# Patient Record
Sex: FEMALE | Race: BLACK OR AFRICAN AMERICAN | NOT HISPANIC OR LATINO | Employment: UNEMPLOYED | URBAN - NONMETROPOLITAN AREA
[De-identification: names, ages, dates, MRNs, and addresses within clinical notes are randomized per-mention and may not be internally consistent; named-entity substitution may affect disease eponyms.]

---

## 2017-01-10 ENCOUNTER — LAB (OUTPATIENT)
Dept: ONCOLOGY | Facility: CLINIC | Age: 57
End: 2017-01-10

## 2017-01-10 ENCOUNTER — OFFICE VISIT (OUTPATIENT)
Dept: ONCOLOGY | Facility: CLINIC | Age: 57
End: 2017-01-10

## 2017-01-10 VITALS
DIASTOLIC BLOOD PRESSURE: 78 MMHG | RESPIRATION RATE: 16 BRPM | WEIGHT: 284 LBS | OXYGEN SATURATION: 98 % | TEMPERATURE: 97.8 F | HEART RATE: 68 BPM | BODY MASS INDEX: 50.32 KG/M2 | SYSTOLIC BLOOD PRESSURE: 138 MMHG | HEIGHT: 63 IN

## 2017-01-10 DIAGNOSIS — C50.911: Primary | ICD-10-CM

## 2017-01-10 DIAGNOSIS — C50.411 MALIGNANT NEOPLASM OF UPPER-OUTER QUADRANT OF RIGHT FEMALE BREAST (HCC): ICD-10-CM

## 2017-01-10 DIAGNOSIS — C50.411 MALIGNANT NEOPLASM OF UPPER-OUTER QUADRANT OF RIGHT FEMALE BREAST (HCC): Primary | ICD-10-CM

## 2017-01-10 DIAGNOSIS — B35.4 TINEA CORPORIS: ICD-10-CM

## 2017-01-10 DIAGNOSIS — D64.9 ANEMIA, UNSPECIFIED TYPE: Primary | ICD-10-CM

## 2017-01-10 DIAGNOSIS — N64.4 BREAST PAIN, LEFT: ICD-10-CM

## 2017-01-10 DIAGNOSIS — C50.911: ICD-10-CM

## 2017-01-10 PROBLEM — C50.919: Status: ACTIVE | Noted: 2017-01-10

## 2017-01-10 LAB
ALBUMIN SERPL-MCNC: 3.8 G/DL (ref 3.5–5)
ALBUMIN/GLOB SERPL: 1 G/DL
ALP SERPL-CCNC: 83 U/L (ref 38–126)
ALT SERPL W P-5'-P-CCNC: 22 U/L (ref 9–52)
ANION GAP SERPL CALCULATED.3IONS-SCNC: 11 MMOL/L
AST SERPL-CCNC: 22 U/L (ref 5–40)
AUTO MIXED CELLS #: 0.3 10*3/UL (ref 0.1–1.5)
AUTO MIXED CELLS %: 7.4 % (ref 0.2–15.1)
BILIRUB SERPL-MCNC: 0.8 MG/DL (ref 0.2–1.3)
BUN BLD-MCNC: 14 MG/DL (ref 7–26)
BUN/CREAT SERPL: 14 (ref 7–25)
CALCIUM SPEC-SCNC: 8.6 MG/DL (ref 8.4–10.2)
CHLORIDE SERPL-SCNC: 102 MMOL/L (ref 98–107)
CO2 SERPL-SCNC: 29 MMOL/L (ref 22–30)
CREAT BLD-MCNC: 1 MG/DL (ref 0.7–1.4)
ERYTHROCYTE [DISTWIDTH] IN BLOOD BY AUTOMATED COUNT: 15.8 % (ref 11.5–14.5)
GFR SERPL CREATININE-BSD FRML MDRD: 57 ML/MIN/1.73
GLOBULIN UR ELPH-MCNC: 3.8 GM/DL
GLUCOSE BLD-MCNC: 130 MG/DL (ref 75–110)
HCT VFR BLD AUTO: 38.1 % (ref 37–47)
HGB BLD-MCNC: 10.9 G/DL (ref 12–16)
LYMPHOCYTES # BLD AUTO: 1.4 10*3/MM3 (ref 0.8–7)
LYMPHOCYTES NFR BLD AUTO: 33.6 % (ref 10–58.5)
MCH RBC QN AUTO: 25 PG (ref 27–31)
MCHC RBC AUTO-ENTMCNC: 28.6 G/DL (ref 33–37)
MCV RBC AUTO: 87.3 FL (ref 81–99)
NEUTROPHILS # BLD AUTO: 2.5 10*3/MM3 (ref 2–7.8)
NEUTROPHILS NFR BLD AUTO: 59 % (ref 37–92)
PLATELET # BLD AUTO: 226 10*3/MM3 (ref 130–400)
PMV BLD AUTO: 8.4 FL (ref 6–12)
POTASSIUM BLD-SCNC: 4.1 MMOL/L (ref 3.6–5)
PROT SERPL-MCNC: 7.6 G/DL (ref 6.3–8.2)
RBC # BLD AUTO: 4.36 10*6/MM3 (ref 4.2–5.4)
SODIUM BLD-SCNC: 142 MMOL/L (ref 137–145)
WBC NRBC COR # BLD: 4.2 10*3/MM3 (ref 4.8–10.8)

## 2017-01-10 PROCEDURE — 85025 COMPLETE CBC W/AUTO DIFF WBC: CPT | Performed by: INTERNAL MEDICINE

## 2017-01-10 PROCEDURE — 99214 OFFICE O/P EST MOD 30 MIN: CPT | Performed by: INTERNAL MEDICINE

## 2017-01-10 PROCEDURE — 80053 COMPREHEN METABOLIC PANEL: CPT | Performed by: INTERNAL MEDICINE

## 2017-01-10 PROCEDURE — 36415 COLL VENOUS BLD VENIPUNCTURE: CPT | Performed by: INTERNAL MEDICINE

## 2017-01-10 RX ORDER — CLONIDINE HYDROCHLORIDE 0.1 MG/1
0.1 TABLET ORAL AS NEEDED
COMMUNITY

## 2017-01-10 RX ORDER — TERBINAFINE HYDROCHLORIDE 250 MG/1
TABLET ORAL
Qty: 21 TABLET | Refills: 0 | Status: SHIPPED | OUTPATIENT
Start: 2017-01-10 | End: 2017-01-26

## 2017-01-10 NOTE — PROGRESS NOTES
[facility]  HEMATOLOGY & ONCOLOGY        Subjective      Patient presents to the office for follow-up.  She reports pain on her left breast, laterally deep close to the chest wall.  The pain is present for the past 2-3 weeks.  She also had an ultrasound and but apparently did not show any abnormality.  Patient does not take any iron supplements.  She did have a mammogram in the middle of last year.  She does have some chronic rash on the lower portion of both breasts and upper abdominal wall , patient states that the rash comes and goes.      VISIT DIAGNOSIS:   Encounter Diagnoses   Name Primary?   • Breast pain, left    • Anemia, unspecified type Yes   • Tinea corporis    • Sarcoma of breast, right        REASON FOR VISIT:   No chief complaint on file.       HEMATOLOGY / ONCOLOGY HISTORY:    No history exists.       Ms. 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 Cortez Koenig. The patient  underwent wide excision of the  tail of the breast tumor and axillary node dissection. Resection was negative for further neoplasm and 11 nodes were negative for  metastatic disease. The patient has been followed off all therapy.  Tumor was negative for estrogen receptor and progesterone receptor.      She then later developed a recurrence and she has undergone repeat surgical procedure performed by Dr. Lali Patel on 11/30/2012, finding recurrent sarcomatoid tumor with the posterior margin of tumor involved. since the patient has not had disease recurrence.    Bilateral mammogram on 07/01/2016 does not show any evidence of malignancy.    She did have colonoscopy with Dr. Aviles in September finding polyps  She   Cancer Staging Information:  No matching staging information was found for the patient.      INTERVAL HISTORY  Patient ID: Patti Gilbert is a 56 y.o. year old female     Past Medical History:   Past Medical History   Diagnosis Date   •  Atrial septal defect      [id number]   • Breast cancer    • COPD (chronic obstructive pulmonary disease)    • H/O cardiac catheterization    • History of left fractured kneecap    • Hypertension    • Mini Stroke    • Morbid obesity    • Myocardial infarction    • Recretional Drug use    • Right knee injury      Past Surgical History:   Past Surgical History   Procedure Laterality Date   • Cardiac surgery       Closure of ASD   • Tubal abdominal ligation Bilateral    • Ganglion cyst excision     • Rotator cuff repair     • Knee arthroscopy Right 01/13/2009   • Breast mass excision       2010     Social History:   Social History     Social History   • Marital status:      Spouse name: N/A   • Number of children: N/A   • Years of education: N/A     Occupational History   • Not on file.     Social History Main Topics   • Smoking status: Never Smoker   • Smokeless tobacco: Not on file   • Alcohol use Yes   • Drug use: Not on file   • Sexual activity: Not on file     Other Topics Concern   • Not on file     Social History Narrative     Family History:   Family History   Problem Relation Age of Onset   • Alzheimer's disease Mother        Review of Systems   Constitutional: Positive for activity change, appetite change and diaphoresis.   HENT: Positive for congestion.    Respiratory: Positive for cough, shortness of breath and wheezing.    Cardiovascular: Positive for leg swelling.   Musculoskeletal: Positive for back pain, joint swelling, neck pain and neck stiffness.   Neurological: Positive for dizziness, light-headedness and headaches.   Hematological: Bruises/bleeds easily.   Psychiatric/Behavioral: Positive for agitation. The patient is nervous/anxious.         Performance Status:  Asymptomatic    Medications:    Current Outpatient Prescriptions   Medication Sig Dispense Refill   • CloNIDine (CATAPRES) 0.1 MG tablet Take 0.1 mg by mouth As Needed for high blood pressure.     • albuterol (PROVENTIL HFA) 108 (90  "BASE) MCG/ACT inhaler Inhale.     • aspirin (ASPIR) 81 MG EC tablet Take 81 mg by mouth Daily.     • atenolol (TENORMIN) 25 MG tablet Take 25 mg by mouth Daily.     • busPIRone (BUSPAR) 10 MG tablet Take 10 mg by mouth.     • ferrous sulfate 325 (65 FE) MG tablet Take 325 mg by mouth Daily.     • furosemide (LASIX) 40 MG tablet Take 40 mg by mouth.     • lisinopril (PRINIVIL,ZESTRIL) 40 MG tablet Take 40 mg by mouth Daily.     • meloxicam (MOBIC) 15 MG tablet Take 15 mg by mouth Daily.     • metFORMIN (GLUCOPHAGE) 500 MG tablet Take 500 mg by mouth.     • methocarbamol (ROBAXIN) 500 MG tablet Take 500 mg by mouth.     • Oxycodone-Acetaminophen (PERCOCET) 7.5-500 MG per tablet Take 1 tablet by mouth.       No current facility-administered medications for this visit.        ALLERGIES:    Allergies   Allergen Reactions   • Cefdinir    • Levofloxacin        Objective      Vitals:    01/10/17 1322   BP: 138/78   Pulse: 68   Resp: 16   Temp: 97.8 °F (36.6 °C)   TempSrc: Tympanic   SpO2: 98%   Weight: 284 lb (129 kg)   Height: 63\" (160 cm)     Visit Vitals   • /78   • Pulse 68   • Temp 97.8 °F (36.6 °C) (Tympanic)   • Resp 16   • Ht 63\" (160 cm)   • Wt 284 lb (129 kg)   • LMP  (LMP Unknown)   • SpO2 98%   • BMI 50.31 kg/m2       Current Status 1/10/2017   ECOG score 2         General Appearance:    Alert, cooperative, no distress, appears stated age ..obese female.     Head:    Normocephalic, without obvious abnormality, atraumatic   Eyes:    PERRL, conjunctiva pink, sclera clear, EOM's intact   Ears:    Not examined   Nose:   Nares normal, septum midline, mucosa normal, no drainage     or sinus tenderness   Throat:   Lips, mucosa, and tongue normal; teeth and gums normal,     mucous membranes moist   Neck:   Supple, Trachea midline   Back:     Symmetric, no curvature, ROM normal, no CVA tenderness   Lungs:     Clear to auscultation bilaterally, respirations unlabored   Chest Wall:    tenderness on palpation of the " chest wall in the upper quadrant of the left breast.  No palpable masses.  There is slight nipple retraction on the right breast.      Heart:    Regular rate and rhythm, S1 and S2 normal, no murmur, rub    or gallop   Abdomen:     Soft, non-tender, bowel sounds active all four quadrants,     no masses, no organomegaly   Extremities:   Extremities normal, atraumatic, no cyanosis or edema       SKIN erythematous and scaly and hyperpigmented scaly rash noted on the lower breasts and upper abdomen         Cervical, supraclavicular, and axillary nodes normal   Neurologic:   Grossly nonfocal, gait coordinated and smooth, cognition is   preserved.       RECENT LABS:  Results for orders placed or performed in visit on 01/10/17   Comprehensive Metabolic Panel   Result Value Ref Range    Glucose 130 (H) 75 - 110 mg/dL    BUN 14 7 - 26 mg/dL    Creatinine 1.00 0.70 - 1.40 mg/dL    Sodium 142 137 - 145 mmol/L    Potassium 4.1 3.6 - 5.0 mmol/L    Chloride 102 98 - 107 mmol/L    CO2 29.0 22.0 - 30.0 mmol/L    Calcium 8.6 8.4 - 10.2 mg/dL    Total Protein 7.6 6.3 - 8.2 g/dL    Albumin 3.80 3.50 - 5.00 g/dL    ALT (SGPT) 22 9 - 52 U/L    AST (SGOT) 22 5 - 40 U/L    Alkaline Phosphatase 83 38 - 126 U/L    Total Bilirubin 0.8 0.2 - 1.3 mg/dL    eGFR Non African Amer 57 (L) >60 mL/min/1.73    Globulin 3.8 gm/dL    A/G Ratio 1.0 g/dL    BUN/Creatinine Ratio 14.0 7.0 - 25.0    Anion Gap 11.0 mmol/L   CBC Auto Differential   Result Value Ref Range    WBC 4.20 (L) 4.80 - 10.80 10*3/mm3    RBC 4.36 4.20 - 5.40 10*6/mm3    Hemoglobin 10.9 (L) 12.0 - 16.0 g/dL    Hematocrit 38.1 37.0 - 47.0 %    MCV 87.3 81.0 - 99.0 fL    MCH 25.0 (L) 27.0 - 31.0 pg    MCHC 28.6 (L) 33.0 - 37.0 g/dL    RDW 15.8 (H) 11.5 - 14.5 %    MPV 8.4 6.0 - 12.0 fL    Platelets 226 130 - 400 10*3/mm3    Neutrophil % 59.0 37.0 - 92.0 %    Lymphocyte % 33.6 10.0 - 58.5 %    Auto Mixed Cells % 7.4 0.2 - 15.1 %    Neutrophils, Absolute 2.50 2.00 - 7.80 10*3/mm3     Lymphocytes, Absolute 1.40 0.80 - 7.00 10*3/mm3    Auto Mixed Cells # 0.30 0.10 - 1.50 10*3/uL             Assessment/Plan     Patient Active Problem List   Diagnosis   • Tinea corporis   • Anemia   • Sarcoma of breast        Diagnoses and all orders for this visit:    Anemia, unspecified type  -     Fe+TIBC+Kash  -     Vitamin B12 & Folate  -     Mammo Diagnostic Left With CAD; Future    Breast pain, left  -     Mammo Diagnostic Left With CAD; Future    Tinea corporis    Sarcoma of breast, right       1.  History of Metaplastic sarcomatoid right breast carcinoma diagnosed 2010. Recurrence was found on mammogram and ultrasound and a repeat  resection on 10/16/2012 was performed with a positive margin. Repeat surgery 11/30/2012. Biopsy report on 12/26/2015 shows healing  excisional wound negative for residual tumor.  Since then patient has been disease free.  2.  Left breast pain: Physical exam reveals tenderness on the deep chest wall in the upper part of the left breast.  Patient had an ultrasound recently and it is apparently negative.  We will obtain that report.  On exam, I can feel some tender soft tissue seems to be pectoralis muscle  3.  Chronic anemia she was on previously  4.  Tinea corporis involving the lower breastsand the upper abdominal wall.      Plan  - We will order diagnostic mammogram of the left breast.  We will order anemia workup  Start patient on terfenadine for systemic therapy of her tinea corporis (dermatophytosis )   follow-up in 2-3 weeks.            Paulo Scott MD    1/10/2017    2:12 PM

## 2017-01-11 LAB
CANCER AG27-29 SERPL-ACNC: 6 U/ML (ref 0–38.6)
CEA SERPL-MCNC: 1.67 NG/ML (ref 0–5)
FERRITIN SERPL-MCNC: 34.4 NG/ML (ref 11.1–264)
FOLATE SERPL-MCNC: 12.2 NG/ML
IRON SATN MFR SERPL: 27 % (ref 20–45)
IRON SERPL-MCNC: 88 MCG/DL (ref 42–180)
TIBC SERPL-MCNC: 322 MCG/DL (ref 225–420)
UIBC SERPL-MCNC: 234 MCG/DL
VIT B12 SERPL-MCNC: 383 PG/ML (ref 239–931)

## 2017-01-23 DIAGNOSIS — C50.411 MALIGNANT NEOPLASM OF UPPER-OUTER QUADRANT OF RIGHT FEMALE BREAST (HCC): Primary | ICD-10-CM

## 2017-01-24 ENCOUNTER — OFFICE VISIT (OUTPATIENT)
Dept: ONCOLOGY | Facility: CLINIC | Age: 57
End: 2017-01-24

## 2017-01-24 ENCOUNTER — LAB (OUTPATIENT)
Dept: ONCOLOGY | Facility: CLINIC | Age: 57
End: 2017-01-24

## 2017-01-24 VITALS
BODY MASS INDEX: 49.89 KG/M2 | SYSTOLIC BLOOD PRESSURE: 146 MMHG | DIASTOLIC BLOOD PRESSURE: 82 MMHG | RESPIRATION RATE: 18 BRPM | HEIGHT: 63 IN | TEMPERATURE: 98.2 F | OXYGEN SATURATION: 98 % | WEIGHT: 281.6 LBS | HEART RATE: 79 BPM

## 2017-01-24 DIAGNOSIS — C50.411 MALIGNANT NEOPLASM OF UPPER-OUTER QUADRANT OF RIGHT FEMALE BREAST (HCC): ICD-10-CM

## 2017-01-24 DIAGNOSIS — C50.911: Primary | ICD-10-CM

## 2017-01-24 LAB
ALBUMIN SERPL-MCNC: 4 G/DL (ref 3.5–5)
ALBUMIN/GLOB SERPL: 1 G/DL
ALP SERPL-CCNC: 93 U/L (ref 38–126)
ALT SERPL W P-5'-P-CCNC: 19 U/L (ref 9–52)
ANION GAP SERPL CALCULATED.3IONS-SCNC: 12 MMOL/L
AST SERPL-CCNC: 25 U/L (ref 5–40)
AUTO MIXED CELLS #: 0.4 10*3/UL (ref 0.1–1.5)
AUTO MIXED CELLS %: 6.6 % (ref 0.2–15.1)
BILIRUB SERPL-MCNC: 0.6 MG/DL (ref 0.2–1.3)
BUN BLD-MCNC: 17 MG/DL (ref 7–26)
BUN/CREAT SERPL: 17 (ref 7–25)
CALCIUM SPEC-SCNC: 8.9 MG/DL (ref 8.4–10.2)
CHLORIDE SERPL-SCNC: 103 MMOL/L (ref 98–107)
CO2 SERPL-SCNC: 30 MMOL/L (ref 22–30)
CREAT BLD-MCNC: 1 MG/DL (ref 0.7–1.4)
ERYTHROCYTE [DISTWIDTH] IN BLOOD BY AUTOMATED COUNT: 16.3 % (ref 11.5–14.5)
GFR SERPL CREATININE-BSD FRML MDRD: 57 ML/MIN/1.73
GLOBULIN UR ELPH-MCNC: 4 GM/DL
GLUCOSE BLD-MCNC: 131 MG/DL (ref 75–110)
HCT VFR BLD AUTO: 39.6 % (ref 37–47)
HGB BLD-MCNC: 11.6 G/DL (ref 12–16)
LYMPHOCYTES # BLD AUTO: 2.1 10*3/MM3 (ref 0.8–7)
LYMPHOCYTES NFR BLD AUTO: 32.8 % (ref 10–58.5)
MCH RBC QN AUTO: 25.9 PG (ref 27–31)
MCHC RBC AUTO-ENTMCNC: 29.3 G/DL (ref 33–37)
MCV RBC AUTO: 88.5 FL (ref 81–99)
NEUTROPHILS # BLD AUTO: 3.9 10*3/MM3 (ref 2–7.8)
NEUTROPHILS NFR BLD AUTO: 60.6 % (ref 37–92)
PLATELET # BLD AUTO: 310 10*3/MM3 (ref 130–400)
PMV BLD AUTO: 8.1 FL (ref 6–12)
POTASSIUM BLD-SCNC: 4.2 MMOL/L (ref 3.6–5)
PROT SERPL-MCNC: 8 G/DL (ref 6.3–8.2)
RBC # BLD AUTO: 4.48 10*6/MM3 (ref 4.2–5.4)
SODIUM BLD-SCNC: 145 MMOL/L (ref 137–145)
WBC NRBC COR # BLD: 6.4 10*3/MM3 (ref 4.8–10.8)

## 2017-01-24 PROCEDURE — 80053 COMPREHEN METABOLIC PANEL: CPT | Performed by: INTERNAL MEDICINE

## 2017-01-24 PROCEDURE — 36415 COLL VENOUS BLD VENIPUNCTURE: CPT | Performed by: INTERNAL MEDICINE

## 2017-01-24 PROCEDURE — 99214 OFFICE O/P EST MOD 30 MIN: CPT | Performed by: INTERNAL MEDICINE

## 2017-01-24 PROCEDURE — 85025 COMPLETE CBC W/AUTO DIFF WBC: CPT | Performed by: INTERNAL MEDICINE

## 2017-01-24 NOTE — MR AVS SNAPSHOT
Patti Gilbert   1/24/2017 2:20 PM   Office Visit    Dept Phone:  [phone]   Encounter #:  [id number]    Provider:  Pedro Luis Ferreira MD   Department:  [facility] HEMATOLOGY & ONCOLOGY                Your Full Care Plan              Your Updated Medication List          This list is accurate as of: 1/24/17  3:55 PM.  Always use your most recent med list.                ASPIR 81 MG EC tablet   Generic drug:  aspirin       atenolol 25 MG tablet   Commonly known as:  TENORMIN       busPIRone 10 MG tablet   Commonly known as:  BUSPAR       CloNIDine 0.1 MG tablet   Commonly known as:  CATAPRES       ferrous sulfate 325 (65 FE) MG tablet       furosemide 40 MG tablet   Commonly known as:  LASIX       lisinopril 40 MG tablet   Commonly known as:  PRINIVIL,ZESTRIL       meloxicam 15 MG tablet   Commonly known as:  MOBIC       metFORMIN 500 MG tablet   Commonly known as:  GLUCOPHAGE       methocarbamol 500 MG tablet   Commonly known as:  ROBAXIN       PERCOCET 7.5-500 MG per tablet   Generic drug:  Oxycodone-Acetaminophen       PROVENTIL  (90 BASE) MCG/ACT inhaler   Generic drug:  albuterol       terbinafine 250 MG tablet   Commonly known as:  lamiSIL   One tab po qd x 3 weeks, until finished               You Were Diagnosed With        Codes Comments    Sarcoma of breast, right    -  Primary ICD-10-CM: C50.911  ICD-9-CM: 174.9       Instructions     None    Patient Instructions History      Upcoming Appointments     Visit Type Date Time Department    FOLLOW UP 1 UNIT 1/24/2017  2:20 PM [facility]    LAB 1/24/2017  2:00 PM [facility]    FOLLOW UP 2/2/2017 10:00 AM [facility] PAD      MyChart Signup     Our records indicate that you have declined [facility] [employer] signup. If you would like to sign up for [employer], please email [facility], [email or web address] [email or web address] or call [phone] to obtain an activation code.             Other Info from Your Visit            "  Your Appointments     Feb 02, 2017 10:00 AM CST   Follow Up with [facility] NP   [facility] (--)    [address] [place] [address] [address]  [place] [address] [phone]           Arrive 15 minutes prior to appointment.              Allergies     Cefdinir      Levofloxacin        Reason for Visit     Follow-up She is here for f/u visit today.      Vital Signs     Blood Pressure Pulse Temperature Respirations Height Weight    146/82 79 98.2 °F (36.8 °C) (Tympanic) 18 63\" (160 cm) 281 lb 9.6 oz (128 kg)    Last Menstrual Period Oxygen Saturation Body Mass Index Smoking Status          (LMP Unknown) 98% 49.88 kg/m2 Never Smoker        Problems and Diagnoses Noted     Sarcoma of breast        "

## 2017-01-25 LAB
CANCER AG27-29 SERPL-ACNC: 9.5 U/ML (ref 0–38.6)
CEA SERPL-MCNC: 1.9 NG/ML (ref 0–4.7)

## 2017-01-25 NOTE — PROGRESS NOTES
No matching staging information was found for the patient.       No history exists.               Subjective     HISTORY OF PRESENT ILLNESS: Ms. 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  Eleven nodes were negative for metastatic disease.  Tumor was negative for estrogen and progesterone receptors which would be expected in a sarcoma.    The patient came back because she had felt something unusual or discomfort in the left side of her chest and had a mammogram and an ultrasound and though they are both unremarkable. She wonders if levaquin made the breast  (L) huirt as it can affect ligaments.  When she was seen here in January of this year just a few days ago on the 10th a mild anemia was noted . At the time she had a respiratory infection. Today's hemoglobin is 11.6 just minimal anemia.          Past Medical History, Past Surgical History, Social History, Family History have been reviewed and are without significant changes except as mentioned.     Her previous  hemoglobin was 10.9.  The MCVs on the high 80s and RDW's are up a bit.  The B12 was 383 and folate 12.2.  Serum iron 88 with a iron binding capacity of 322 and ferritin was 34.4 for some reason CEAs and CA-27-29 7 being done today as well CEA was 1.67 CA-27-29 is 6' As far as I know of these studies are never become positive in sarcomas.  I look to see it for chest x-rays which I believe must been done somewhere since one would follow that sarcoma  That way but at the present I do not see one.  It may have been done at an outside site as the mammogram and ultrasound were. The patient is on iron is no evidence suggestive of iron loss through bleeding.    Short while ago the patient  did have a cough and some bronchitis within 2 weeks ago.  The hemoglobins go drop due acute infections and improve  after the episodes  Resolve. The patient has been treated for tenia corporis.  She is asked me about this rash fluid on her chest beneath her breasts both sides and upper abdomen. She was placed on her terfenidine , a systemic treatment, she says is not really working.      She should see dermatology.    Review of Systems   Constitutional: Positive for appetite change, chills and fatigue. Negative for diaphoresis and fever.   HENT: Negative for ear pain, facial swelling, mouth sores, nosebleeds, sore throat, tinnitus, trouble swallowing and voice change.    Eyes: Positive for itching. Negative for pain, redness and visual disturbance.   Respiratory: Positive for cough, shortness of breath and wheezing. Negative for chest tightness.    Cardiovascular: Negative for chest pain, palpitations and leg swelling.   Gastrointestinal: Positive for diarrhea. Negative for abdominal pain, anal bleeding, blood in stool, constipation, nausea and vomiting.   Endocrine: Positive for cold intolerance and heat intolerance. Negative for polydipsia and polyuria.   Genitourinary: Negative for difficulty urinating, dysuria, frequency, hematuria and urgency.   Musculoskeletal: Positive for back pain, joint swelling, neck pain and neck stiffness. Negative for arthralgias and myalgias.   Skin: Positive for rash. Negative for wound.   Neurological: Positive for headaches. Negative for tremors, seizures, speech difficulty, weakness, light-headedness and numbness.   Hematological: Negative for adenopathy. Does not bruise/bleed easily.   Psychiatric/Behavioral: Positive for agitation, confusion and sleep disturbance. Negative for dysphoric mood. The patient is nervous/anxious and is hyperactive.           Medications:        Current Outpatient Prescriptions:   •  albuterol (PROVENTIL HFA) 108 (90 BASE) MCG/ACT inhaler, Inhale.,  "Disp: , Rfl:   •  aspirin (ASPIR) 81 MG EC tablet, Take 81 mg by mouth Daily., Disp: , Rfl:   •  atenolol (TENORMIN) 25 MG tablet, Take 25 mg by mouth Daily., Disp: , Rfl:   •  busPIRone (BUSPAR) 10 MG tablet, Take 10 mg by mouth., Disp: , Rfl:   •  CloNIDine (CATAPRES) 0.1 MG tablet, Take 0.1 mg by mouth As Needed for high blood pressure., Disp: , Rfl:   •  furosemide (LASIX) 40 MG tablet, Take 40 mg by mouth., Disp: , Rfl:   •  lisinopril (PRINIVIL,ZESTRIL) 40 MG tablet, Take 40 mg by mouth Daily., Disp: , Rfl:   •  meloxicam (MOBIC) 15 MG tablet, Take 15 mg by mouth Daily., Disp: , Rfl:   •  metFORMIN (GLUCOPHAGE) 500 MG tablet, Take 500 mg by mouth., Disp: , Rfl:   •  methocarbamol (ROBAXIN) 500 MG tablet, Take 500 mg by mouth., Disp: , Rfl:   •  Oxycodone-Acetaminophen (PERCOCET) 7.5-500 MG per tablet, Take 1 tablet by mouth., Disp: , Rfl:   •  terbinafine (lamiSIL) 250 MG tablet, One tab po qd x 3 weeks, until finished, Disp: 21 tablet, Rfl: 0    ALLERGIES:    Allergies   Allergen Reactions   • Cefdinir    • Levofloxacin        Objective      Vitals:    01/24/17 1451   BP: 146/82   Pulse: 79   Resp: 18   Temp: 98.2 °F (36.8 °C)   TempSrc: Tympanic   SpO2: 98%   Weight: 281 lb 9.6 oz (128 kg)   Height: 63\" (160 cm)     Current Status 1/24/2017   ECOG score 2       Physical Exam   Constitutional: She is oriented to person, place, and time. She appears well-developed. No distress.   obese   HENT:   Head: Normocephalic.   Mouth/Throat: Oropharynx is clear and moist.   Eyes: EOM are normal. No scleral icterus.   Neck: No tracheal deviation present. No thyromegaly present.   Cardiovascular: Normal rate, regular rhythm and normal heart sounds.  Exam reveals no gallop.    No murmur heard.  Pulmonary/Chest: Effort normal and breath sounds normal. No respiratory distress. She has no wheezes. She has no rales. Right breast exhibits no mass, no nipple discharge and no skin change. Left breast exhibits no mass, no nipple " discharge and no skin change.       Abdominal: Soft. Bowel sounds are normal. She exhibits no mass. There is no hepatosplenomegaly. There is no tenderness.   Musculoskeletal: She exhibits no tenderness or deformity.   Lymphadenopathy:     She has no cervical adenopathy.     She has no axillary adenopathy.        Right: No inguinal and no supraclavicular adenopathy present.        Left: No inguinal and no supraclavicular adenopathy present.   Neurological: She is alert and oriented to person, place, and time. She has normal strength. No cranial nerve deficit.   Psychiatric: She has a normal mood and affect. Her behavior is normal.   Vitals reviewed.        RECENT LABS:  Hematology WBC   Date Value Ref Range Status   01/24/2017 6.40 4.80 - 10.80 10*3/mm3 Final     RBC   Date Value Ref Range Status   01/24/2017 4.48 4.20 - 5.40 10*6/mm3 Final     HEMOGLOBIN   Date Value Ref Range Status   01/24/2017 11.6 (L) 12.0 - 16.0 g/dL Final     HEMATOCRIT   Date Value Ref Range Status   01/24/2017 39.6 37.0 - 47.0 % Final     PLATELETS   Date Value Ref Range Status   01/24/2017 310 130 - 400 10*3/mm3 Final            Lab on 01/24/2017   Component Date Value Ref Range Status   • Glucose 01/24/2017 131* 75 - 110 mg/dL Final   • BUN 01/24/2017 17  7 - 26 mg/dL Final   • Creatinine 01/24/2017 1.00  0.70 - 1.40 mg/dL Final   • Sodium 01/24/2017 145  137 - 145 mmol/L Final   • Potassium 01/24/2017 4.2  3.6 - 5.0 mmol/L Final   • Chloride 01/24/2017 103  98 - 107 mmol/L Final   • CO2 01/24/2017 30.0  22.0 - 30.0 mmol/L Final   • Calcium 01/24/2017 8.9  8.4 - 10.2 mg/dL Final   • Total Protein 01/24/2017 8.0  6.3 - 8.2 g/dL Final   • Albumin 01/24/2017 4.00  3.50 - 5.00 g/dL Final   • ALT (SGPT) 01/24/2017 19  9 - 52 U/L Final   • AST (SGOT) 01/24/2017 25  5 - 40 U/L Final   • Alkaline Phosphatase 01/24/2017 93  38 - 126 U/L Final   • Total Bilirubin 01/24/2017 0.6  0.2 - 1.3 mg/dL Final   • eGFR Non  Amer 01/24/2017 57* >60  mL/min/1.73 Final   • Globulin 01/24/2017 4.0  gm/dL Final   • A/G Ratio 01/24/2017 1.0  g/dL Final   • BUN/Creatinine Ratio 01/24/2017 17.0  7.0 - 25.0 Final   • Anion Gap 01/24/2017 12.0  mmol/L Final   • WBC 01/24/2017 6.40  4.80 - 10.80 10*3/mm3 Final   • RBC 01/24/2017 4.48  4.20 - 5.40 10*6/mm3 Final   • Hemoglobin 01/24/2017 11.6* 12.0 - 16.0 g/dL Final   • Hematocrit 01/24/2017 39.6  37.0 - 47.0 % Final   • MCV 01/24/2017 88.5  81.0 - 99.0 fL Final   • MCH 01/24/2017 25.9* 27.0 - 31.0 pg Final   • MCHC 01/24/2017 29.3* 33.0 - 37.0 g/dL Final   • RDW 01/24/2017 16.3* 11.5 - 14.5 % Final   • MPV 01/24/2017 8.1  6.0 - 12.0 fL Final   • Platelets 01/24/2017 310  130 - 400 10*3/mm3 Final   • Neutrophil % 01/24/2017 60.6  37.0 - 92.0 % Final   • Lymphocyte % 01/24/2017 32.8  10.0 - 58.5 % Final   • Auto Mixed Cells % 01/24/2017 6.6  0.2 - 15.1 % Final   • Neutrophils, Absolute 01/24/2017 3.90  2.00 - 7.80 10*3/mm3 Final   • Lymphocytes, Absolute 01/24/2017 2.10  0.80 - 7.00 10*3/mm3 Final   • Auto Mixed Cells # 01/24/2017 0.40  0.10 - 1.50 10*3/uL Final   Lab on 01/10/2017   Component Date Value Ref Range Status   • Glucose 01/10/2017 130* 75 - 110 mg/dL Final   • BUN 01/10/2017 14  7 - 26 mg/dL Final   • Creatinine 01/10/2017 1.00  0.70 - 1.40 mg/dL Final   • Sodium 01/10/2017 142  137 - 145 mmol/L Final   • Potassium 01/10/2017 4.1  3.6 - 5.0 mmol/L Final   • Chloride 01/10/2017 102  98 - 107 mmol/L Final   • CO2 01/10/2017 29.0  22.0 - 30.0 mmol/L Final   • Calcium 01/10/2017 8.6  8.4 - 10.2 mg/dL Final   • Total Protein 01/10/2017 7.6  6.3 - 8.2 g/dL Final   • Albumin 01/10/2017 3.80  3.50 - 5.00 g/dL Final   • ALT (SGPT) 01/10/2017 22  9 - 52 U/L Final   • AST (SGOT) 01/10/2017 22  5 - 40 U/L Final   • Alkaline Phosphatase 01/10/2017 83  38 - 126 U/L Final   • Total Bilirubin 01/10/2017 0.8  0.2 - 1.3 mg/dL Final   • eGFR Non African Amer 01/10/2017 57* >60 mL/min/1.73 Final   • Globulin 01/10/2017 3.8  gm/dL  Final   • A/G Ratio 01/10/2017 1.0  g/dL Final   • BUN/Creatinine Ratio 01/10/2017 14.0  7.0 - 25.0 Final   • Anion Gap 01/10/2017 11.0  mmol/L Final   • WBC 01/10/2017 4.20* 4.80 - 10.80 10*3/mm3 Final   • RBC 01/10/2017 4.36  4.20 - 5.40 10*6/mm3 Final   • Hemoglobin 01/10/2017 10.9* 12.0 - 16.0 g/dL Final   • Hematocrit 01/10/2017 38.1  37.0 - 47.0 % Final   • MCV 01/10/2017 87.3  81.0 - 99.0 fL Final   • MCH 01/10/2017 25.0* 27.0 - 31.0 pg Final   • MCHC 01/10/2017 28.6* 33.0 - 37.0 g/dL Final   • RDW 01/10/2017 15.8* 11.5 - 14.5 % Final   • MPV 01/10/2017 8.4  6.0 - 12.0 fL Final   • Platelets 01/10/2017 226  130 - 400 10*3/mm3 Final   • Neutrophil % 01/10/2017 59.0  37.0 - 92.0 % Final   • Lymphocyte % 01/10/2017 33.6  10.0 - 58.5 % Final   • Auto Mixed Cells % 01/10/2017 7.4  0.2 - 15.1 % Final   • Neutrophils, Absolute 01/10/2017 2.50  2.00 - 7.80 10*3/mm3 Final   • Lymphocytes, Absolute 01/10/2017 1.40  0.80 - 7.00 10*3/mm3 Final   • Auto Mixed Cells # 01/10/2017 0.30  0.10 - 1.50 10*3/uL Final   • TIBC 01/10/2017 322  225 - 420 mcg/dL Final   • UIBC 01/10/2017 234  mcg/dL Final   • Iron 01/10/2017 88  42 - 180 mcg/dL Final   • Iron Saturation 01/10/2017 27  20 - 45 % Final   • Vitamin B-12 01/10/2017 383  239 - 931 pg/mL Final   • Folate 01/10/2017 12.20  ng/mL Final   • CEA 01/10/2017 1.67  0.00 - 5.00 ng/mL Final   • CA 27.29 01/10/2017 6.0  0.0 - 38.6 U/mL Final    [facility]/ACS methodology   • Ferritin 01/10/2017 34.40  11.10 - 264.00 ng/mL Final         Patti was seen today for follow-up.    Diagnoses and all orders for this visit:    Sarcoma of breast, right  -     XR Chest 2 View    Assessment: Sarcoma right breast.  Plan this patient should have had chest x-rays.  The patient is 7 years out and has had no obvious relapse.  She try does not have to have regular chest x-rays plans to like to see at least one period and see if we have records of old ones.   I planned to do it but we started looking  for her mammogram and the ultrasound and the x-ray was inadvertently not ordered. We will call her up and get the x-ray. She does not need tumor markers for a sarcoma. Need to Discontinue them.      Anemia.  So minimal that I do not know if we should do an extensive workup now but watch.  There is no suggestion of iron deficiency for some reason the patient is on ferrous sulfate which she takes which she feels tired. She says it causes constipation.  I have told to stop it as it would just interfere with accurate workup if we need to do that.    In regard to the skin condition I am not sure is getting better. I am reluctant to let her have a oral medication.  She will make a dermatologic counts of appointment for her and tell her to stop the terfenadine.                  1/24/2017  Pedro Luis Arnold    CC:

## 2017-01-26 ENCOUNTER — TELEPHONE (OUTPATIENT)
Dept: ONCOLOGY | Facility: CLINIC | Age: 57
End: 2017-01-26

## 2017-01-26 NOTE — TELEPHONE ENCOUNTER
Patient called per Dr Barcenas instructions to have her d/c use of her oral Iron Tabs along with Lamisil, she v/u of directions.

## 2017-01-31 PROBLEM — R00.2 PALPITATIONS: Status: ACTIVE | Noted: 2017-01-31

## 2017-01-31 PROBLEM — I10 HTN (HYPERTENSION): Status: ACTIVE | Noted: 2017-01-31

## 2017-04-19 ENCOUNTER — OFFICE VISIT (OUTPATIENT)
Dept: CARDIOLOGY | Facility: CLINIC | Age: 57
End: 2017-04-19

## 2017-04-19 VITALS
WEIGHT: 286.6 LBS | BODY MASS INDEX: 50.78 KG/M2 | DIASTOLIC BLOOD PRESSURE: 84 MMHG | HEART RATE: 70 BPM | HEIGHT: 63 IN | SYSTOLIC BLOOD PRESSURE: 168 MMHG

## 2017-04-19 DIAGNOSIS — R00.2 PALPITATIONS: ICD-10-CM

## 2017-04-19 DIAGNOSIS — I10 ESSENTIAL HYPERTENSION: Primary | ICD-10-CM

## 2017-04-19 DIAGNOSIS — R07.89 ATYPICAL CHEST PAIN: ICD-10-CM

## 2017-04-19 DIAGNOSIS — E11.9 TYPE 2 DIABETES MELLITUS WITHOUT COMPLICATION, WITHOUT LONG-TERM CURRENT USE OF INSULIN (HCC): ICD-10-CM

## 2017-04-19 PROCEDURE — 93000 ELECTROCARDIOGRAM COMPLETE: CPT | Performed by: PHYSICIAN ASSISTANT

## 2017-04-19 PROCEDURE — 99214 OFFICE O/P EST MOD 30 MIN: CPT | Performed by: PHYSICIAN ASSISTANT

## 2017-04-19 RX ORDER — AMLODIPINE BESYLATE 5 MG/1
5 TABLET ORAL DAILY
Qty: 30 TABLET | Refills: 11 | Status: SHIPPED | OUTPATIENT
Start: 2017-04-19 | End: 2018-05-11 | Stop reason: SDUPTHER

## 2017-04-19 NOTE — PROGRESS NOTES
Subjective:     Encounter Date:04/19/2017      Patient ID: Patti Gilbert is a 56 y.o. female with history of HTN, diabetes mellitus, COPD who [facility]. The patient reports she has continued to have intermittent, fleeting, sharp L sided chest pain since her last visit. She denies any association with exertion. She denies any increased exertional dyspnea. She also denies any excessive leg swelling, any orthopnea, or other associated symptom. The patient does report chronic pain in multiple areas, including ankles, arms, neck, and L chest.   She also notes increasing palpitations over last few months.   Chief Complaint:  Hypertension   This is a chronic problem. The current episode started more than 1 year ago. The problem has been gradually improving since onset. The problem is controlled. Associated symptoms include chest pain and palpitations. Pertinent negatives include no malaise/fatigue, orthopnea, PND or shortness of breath.   Chest Pain    This is a recurrent problem. The current episode started more than 1 year ago. The onset quality is sudden. The problem occurs every several days. The problem has been unchanged. The pain is present in the lateral region. The pain is at a severity of 4/10. The quality of the pain is described as sharp. The pain does not radiate. Associated symptoms include back pain and palpitations. Pertinent negatives include no claudication, dizziness, hemoptysis, irregular heartbeat, malaise/fatigue, nausea, near-syncope, orthopnea, PND, shortness of breath, syncope, vomiting or weakness. The pain is aggravated by nothing. She has tried nothing for the symptoms. Risk factors include lack of exercise and obesity.   Pertinent negatives for past medical history include no CAD and no CHF.   Palpitations    This is a recurrent problem. The current episode started more than 1 month ago. The problem occurs every several days. The problem has been  "gradually worsening. Nothing aggravates the symptoms. Associated symptoms include chest pain. Pertinent negatives include no dizziness, irregular heartbeat, malaise/fatigue, nausea, near-syncope, shortness of breath, syncope, vomiting or weakness. She has tried nothing for the symptoms. Risk factors include obesity and dyslipidemia.       The following portions of the patient's history were reviewed and updated as appropriate: allergies, current medications, past family history, past medical history, past social history, past surgical history and problem list.    Review of Systems   Constitution: Negative for weakness, malaise/fatigue and weight gain.   Cardiovascular: Positive for chest pain, dyspnea on exertion (chronic, stable) and palpitations. Negative for claudication, irregular heartbeat, leg swelling, near-syncope, orthopnea, paroxysmal nocturnal dyspnea and syncope.   Respiratory: Negative for hemoptysis and shortness of breath.    Hematologic/Lymphatic: Negative for bleeding problem. Does not bruise/bleed easily.   Skin: Negative for poor wound healing.   Musculoskeletal: Positive for back pain and joint pain. Negative for myalgias.   Gastrointestinal: Negative for melena, nausea and vomiting.   Genitourinary: Negative for hematuria.   Neurological: Negative for dizziness, focal weakness and light-headedness.   Psychiatric/Behavioral: Negative for memory loss.   All other systems reviewed and are negative.        ECG 12 Lead  Date/Time: 4/19/2017 4:13 PM  Performed by: JONATHAN CALABRESE  Authorized by: JONATHAN CALABRESE   Comparison: compared with previous ECG from 6/29/2015  Similar to previous ECG  Rhythm: sinus rhythm  Other findings: MADI  Clinical impression: non-specific ECG          /84  Pulse 70  Ht 63\" (160 cm)  Wt 286 lb 9.6 oz (130 kg)  BMI 50.77 kg/m2    Current Outpatient Prescriptions:   •  albuterol (PROVENTIL HFA) 108 (90 BASE) MCG/ACT inhaler, Inhale., Disp: , Rfl:   •  aspirin (ASPIR) " 81 MG EC tablet, Take 81 mg by mouth Daily., Disp: , Rfl:   •  atenolol (TENORMIN) 25 MG tablet, Take 25 mg by mouth Daily., Disp: , Rfl:   •  CloNIDine (CATAPRES) 0.1 MG tablet, Take 0.1 mg by mouth As Needed for high blood pressure., Disp: , Rfl:   •  furosemide (LASIX) 40 MG tablet, Take 40 mg by mouth., Disp: , Rfl:   •  lisinopril (PRINIVIL,ZESTRIL) 40 MG tablet, Take 40 mg by mouth Daily., Disp: , Rfl:   •  metFORMIN (GLUCOPHAGE) 500 MG tablet, Take 500 mg by mouth As Needed., Disp: , Rfl:   •  Oxycodone-Acetaminophen (PERCOCET) 7.5-500 MG per tablet, Take 1 tablet by mouth 3 (Three) Times a Day., Disp: , Rfl:   •  amLODIPine (NORVASC) 5 MG tablet, Take 1 tablet by mouth Daily., Disp: 30 tablet, Rfl: 11  Past Medical History:   Diagnosis Date   • Atrial septal defect     [id number]   • Breast cancer    • COPD (chronic obstructive pulmonary disease)    • Diabetes mellitus    • H/O cardiac catheterization    • History of left fractured kneecap    • Hypertension    • Mini Stroke    • Morbid obesity    • Myocardial infarction    • Recretional Drug use    • Right knee injury      Past Surgical History:   Procedure Laterality Date   • BREAST MASS EXCISION      2010   • CARDIAC SURGERY      Closure of ASD   • GANGLION CYST EXCISION     • KNEE ARTHROSCOPY Right 01/13/2009   • ROTATOR CUFF REPAIR     • TUBAL ABDOMINAL LIGATION Bilateral      Allergies   Allergen Reactions   • Cefdinir    • Levofloxacin      Social History     Social History   • Marital status:      Spouse name: N/A   • Number of children: N/A   • Years of education: N/A     Occupational History   • Not on file.     Social History Main Topics   • Smoking status: Never Smoker   • Smokeless tobacco: Never Used   • Alcohol use Yes      Comment: occ.   • Drug use: Yes     Special: Oxycodone   • Sexual activity: Defer     Other Topics Concern   • Not on file     Social History Narrative     Family History   Problem Relation Age of Onset   • Alzheimer's  disease Mother    • Cancer Mother    • Heart disease Father    • Cancer Father    • No Known Problems Sister    • Heart failure Brother           Objective:     Physical Exam   Constitutional: She is oriented to person, place, and time. She appears well-developed and well-nourished.   obese   HENT:   Head: Normocephalic and atraumatic.   Eyes: Conjunctivae and EOM are normal. Pupils are equal, round, and reactive to light.   Neck: Normal range of motion. Neck supple. No JVD present.   Cardiovascular: Normal rate, regular rhythm, S1 normal, S2 normal, normal heart sounds, intact distal pulses and normal pulses.    No murmur heard.  Pulmonary/Chest: Effort normal and breath sounds normal. No respiratory distress.   Abdominal: Soft. Bowel sounds are normal. She exhibits no distension.   Musculoskeletal: She exhibits no edema.   Neurological: She is alert and oriented to person, place, and time.   Skin: Skin is warm and dry.   Psychiatric: She has a normal mood and affect. Judgment normal.   Vitals reviewed.            Assessment:          Diagnosis Plan   1. Essential hypertension      poorly controlled. Pt reports average SBP 150s at home   2. Palpitations  Holter Monitor - 24 Hour   3. Atypical chest pain     4. Type 2 diabetes mellitus without complication, without long-term current use of insulin            Plan:       1. Patient reports increasing palptations- will order 24 hour holter  2. No need for further cardiac workup at this time, as patient describes atypical, left sided, fleeting, sharp chest pain at rest. Certainly, if patient presents with exertional chest pain, increasing exertional dyspnea or related symptom, will perform further ischemic workup. Normal cardiac cath in 2013.  3. Start Norvasc. Keep daily BP log x 1 week and follow-up with PCP.   4. Counseled on diet, exercise, medication compliance  5. Follow-up in 1 year unless needed sooner or Holter monitor suggests otherwise.   6. Verbalized  understanding of instructions.

## 2017-05-05 ENCOUNTER — HOSPITAL ENCOUNTER (OUTPATIENT)
Dept: CARDIOLOGY | Facility: HOSPITAL | Age: 57
Discharge: HOME | End: 2017-05-05
Admitting: PHYSICIAN ASSISTANT

## 2017-05-05 DIAGNOSIS — R00.2 PALPITATIONS: ICD-10-CM

## 2017-05-05 PROCEDURE — 93225 XTRNL ECG REC<48 HRS REC: CPT

## 2017-05-05 PROCEDURE — 93226 XTRNL ECG REC<48 HR SCAN A/R: CPT

## 2017-05-11 PROCEDURE — 93227 XTRNL ECG REC<48 HR R&I: CPT | Performed by: INTERNAL MEDICINE

## 2017-07-06 ENCOUNTER — TELEPHONE (OUTPATIENT)
Dept: CARDIOLOGY | Facility: CLINIC | Age: 57
End: 2017-07-06

## 2017-07-06 NOTE — TELEPHONE ENCOUNTER
Pt called requesting the results of the holter monitor. I called pt back and gave her results of a normal study.  Brandt Franco, CMA

## 2017-07-12 ENCOUNTER — LAB (OUTPATIENT)
Dept: ONCOLOGY | Facility: CLINIC | Age: 57
End: 2017-07-12

## 2017-07-12 ENCOUNTER — OFFICE VISIT (OUTPATIENT)
Dept: ONCOLOGY | Facility: CLINIC | Age: 57
End: 2017-07-12

## 2017-07-12 VITALS
HEIGHT: 63 IN | RESPIRATION RATE: 16 BRPM | TEMPERATURE: 98.8 F | SYSTOLIC BLOOD PRESSURE: 150 MMHG | HEART RATE: 76 BPM | DIASTOLIC BLOOD PRESSURE: 90 MMHG | OXYGEN SATURATION: 98 %

## 2017-07-12 DIAGNOSIS — C50.411 MALIGNANT NEOPLASM OF UPPER-OUTER QUADRANT OF RIGHT FEMALE BREAST (HCC): Primary | ICD-10-CM

## 2017-07-12 DIAGNOSIS — C50.919 MALIGNANT NEOPLASM OF OTHER SPECIFIED SITES OF FEMALE BREAST: Primary | ICD-10-CM

## 2017-07-12 DIAGNOSIS — N64.4 BREAST PAIN, RIGHT: ICD-10-CM

## 2017-07-12 DIAGNOSIS — C50.911: Primary | ICD-10-CM

## 2017-07-12 LAB
ALBUMIN SERPL-MCNC: 4.1 G/DL (ref 3.5–5)
ALBUMIN/GLOB SERPL: 1.1 G/DL
ALP SERPL-CCNC: 76 U/L (ref 38–126)
ALT SERPL W P-5'-P-CCNC: 18 U/L (ref 9–52)
ANION GAP SERPL CALCULATED.3IONS-SCNC: 10 MMOL/L
AST SERPL-CCNC: 22 U/L (ref 5–40)
AUTO MIXED CELLS #: 0.4 10*3/MM3 (ref 0.1–1.5)
AUTO MIXED CELLS %: 7.1 % (ref 0.2–15.1)
BILIRUB SERPL-MCNC: 0.6 MG/DL (ref 0.2–1.3)
BUN BLD-MCNC: 14 MG/DL (ref 7–26)
BUN/CREAT SERPL: 15.6 (ref 7–25)
CALCIUM SPEC-SCNC: 9 MG/DL (ref 8.4–10.2)
CHLORIDE SERPL-SCNC: 108 MMOL/L (ref 98–107)
CO2 SERPL-SCNC: 26 MMOL/L (ref 22–30)
CREAT BLD-MCNC: 0.9 MG/DL (ref 0.7–1.4)
ERYTHROCYTE [DISTWIDTH] IN BLOOD BY AUTOMATED COUNT: 14.8 % (ref 11.5–14.5)
GFR SERPL CREATININE-BSD FRML MDRD: 64 ML/MIN/1.73
GLOBULIN UR ELPH-MCNC: 3.8 GM/DL
GLUCOSE BLD-MCNC: 139 MG/DL (ref 75–110)
HCT VFR BLD AUTO: 38.4 % (ref 37–47)
HGB BLD-MCNC: 11.9 G/DL (ref 12–16)
LYMPHOCYTES # BLD AUTO: 1.7 10*3/MM3 (ref 0.8–7)
LYMPHOCYTES NFR BLD AUTO: 27.3 % (ref 10–58.5)
MCH RBC QN AUTO: 28 PG (ref 27–31)
MCHC RBC AUTO-ENTMCNC: 31 G/DL (ref 33–37)
MCV RBC AUTO: 90.3 FL (ref 81–99)
NEUTROPHILS # BLD AUTO: 4.1 10*3/MM3 (ref 2–7.8)
NEUTROPHILS NFR BLD AUTO: 65.6 % (ref 37–92)
PLATELET # BLD AUTO: 278 10*3/MM3 (ref 130–400)
PMV BLD AUTO: 8.3 FL (ref 6–12)
POTASSIUM BLD-SCNC: 3.9 MMOL/L (ref 3.6–5)
PROT SERPL-MCNC: 7.9 G/DL (ref 6.3–8.2)
RBC # BLD AUTO: 4.25 10*6/MM3 (ref 4.2–5.4)
SODIUM BLD-SCNC: 144 MMOL/L (ref 137–145)
WBC NRBC COR # BLD: 6.3 10*3/MM3 (ref 4.8–10.8)

## 2017-07-12 PROCEDURE — 99213 OFFICE O/P EST LOW 20 MIN: CPT | Performed by: INTERNAL MEDICINE

## 2017-07-12 PROCEDURE — 80053 COMPREHEN METABOLIC PANEL: CPT | Performed by: INTERNAL MEDICINE

## 2017-07-12 PROCEDURE — 85025 COMPLETE CBC W/AUTO DIFF WBC: CPT | Performed by: INTERNAL MEDICINE

## 2017-07-12 RX ORDER — METHOCARBAMOL 500 MG/1
TABLET, FILM COATED ORAL AS NEEDED
COMMUNITY
Start: 2017-05-04 | End: 2019-12-05

## 2017-07-12 RX ORDER — OXYCODONE AND ACETAMINOPHEN 7.5; 325 MG/1; MG/1
TABLET ORAL
COMMUNITY
Start: 2017-06-28 | End: 2018-07-18 | Stop reason: ALTCHOICE

## 2017-07-12 NOTE — PROGRESS NOTES
[facility]  HEMATOLOGY & ONCOLOGY        Subjective      Patient presents to the office for follow-up.  She reports pain on her left breast, laterally deep close to the chest wall.  The pain is present for the past 2-3 weeks.  She also had an ultrasound and but apparently did not show any abnormality.  Patient does not take any iron supplements.  She did have a mammogram in the middle of last year.  She does have some chronic rash on the lower portion of both breasts and upper abdominal wall , patient states that the rash comes and goes.      VISIT DIAGNOSIS:   No diagnosis found.    REASON FOR VISIT:   No chief complaint on file.       HEMATOLOGY / ONCOLOGY HISTORY:    No history exists.       Ms. 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 Cortez Koenig. The patient  underwent wide excision of the  tail of the breast tumor and axillary node dissection. Resection was negative for further neoplasm and 11 nodes were negative for  metastatic disease. The patient has been followed off all therapy.  Tumor was negative for estrogen receptor and progesterone receptor.      She then later developed a recurrence and she has undergone repeat surgical procedure performed by Dr. Lali Patel on 11/30/2012, finding recurrent sarcomatoid tumor with the posterior margin of tumor involved. since the patient has not had disease recurrence.    Bilateral mammogram on 07/01/2016 does not show any evidence of malignancy.    She did have colonoscopy with Dr. Aviles in September finding polyps  She   Cancer Staging Information:  No matching staging information was found for the patient.      INTERVAL HISTORY  Patient ID: Patti Gilbert is a 57 y.o. year old female     Past Medical History:   Past Medical History:   Diagnosis Date   • Atrial septal defect     2232010   • Breast cancer    • COPD (chronic obstructive pulmonary disease)    • Diabetes mellitus     • H/O cardiac catheterization    • History of left fractured kneecap    • Hypertension    • Mini Stroke    • Morbid obesity    • Myocardial infarction    • Recretional Drug use    • Right knee injury      Past Surgical History:   Past Surgical History:   Procedure Laterality Date   • BREAST MASS EXCISION      2010   • CARDIAC SURGERY      Closure of ASD   • GANGLION CYST EXCISION     • KNEE ARTHROSCOPY Right 01/13/2009   • ROTATOR CUFF REPAIR     • TUBAL ABDOMINAL LIGATION Bilateral      Social History:   Social History     Social History   • Marital status:      Spouse name: N/A   • Number of children: N/A   • Years of education: N/A     Occupational History   • Not on file.     Social History Main Topics   • Smoking status: Never Smoker   • Smokeless tobacco: Never Used   • Alcohol use Yes      Comment: occ.   • Drug use: Yes     Special: Oxycodone   • Sexual activity: Defer     Other Topics Concern   • Not on file     Social History Narrative     Family History:   Family History   Problem Relation Age of Onset   • Alzheimer's disease Mother    • Cancer Mother    • Heart disease Father    • Cancer Father    • No Known Problems Sister    • Heart failure Brother        Review of Systems   Constitutional: Positive for activity change, appetite change and diaphoresis.   HENT: Positive for congestion.    Eyes: Negative.    Respiratory: Positive for cough, shortness of breath and wheezing.    Cardiovascular: Positive for leg swelling.   Gastrointestinal: Negative.    Endocrine: Negative.    Genitourinary: Negative.    Musculoskeletal: Positive for back pain, joint swelling, neck pain and neck stiffness.   Skin: Negative.    Allergic/Immunologic: Negative.    Neurological: Positive for dizziness, light-headedness and headaches.   Hematological: Bruises/bleeds easily.   Psychiatric/Behavioral: Positive for agitation. The patient is nervous/anxious.         Performance Status:  Asymptomatic    Medications:     Current Outpatient Prescriptions   Medication Sig Dispense Refill   • albuterol (PROVENTIL HFA) 108 (90 BASE) MCG/ACT inhaler Inhale.     • amLODIPine (NORVASC) 5 MG tablet Take 1 tablet by mouth Daily. 30 tablet 11   • aspirin (ASPIR) 81 MG EC tablet Take 81 mg by mouth Daily.     • atenolol (TENORMIN) 25 MG tablet Take 25 mg by mouth Daily.     • CloNIDine (CATAPRES) 0.1 MG tablet Take 0.1 mg by mouth As Needed for high blood pressure.     • furosemide (LASIX) 40 MG tablet Take 40 mg by mouth.     • lisinopril (PRINIVIL,ZESTRIL) 40 MG tablet Take 40 mg by mouth Daily.     • metFORMIN (GLUCOPHAGE) 500 MG tablet Take 500 mg by mouth As Needed.     • Oxycodone-Acetaminophen (PERCOCET) 7.5-500 MG per tablet Take 1 tablet by mouth 3 (Three) Times a Day.       No current facility-administered medications for this visit.        ALLERGIES:    Allergies   Allergen Reactions   • Cefdinir    • Levofloxacin        Objective      There were no vitals filed for this visit.  There were no vitals taken for this visit.    Current Status 1/24/2017   ECOG score 2         General Appearance:    Alert, cooperative, no distress, appears stated age ..obese female.     Head:    Normocephalic, without obvious abnormality, atraumatic   Eyes:    PERRL, conjunctiva pink, sclera clear, EOM's intact   Ears:    Not examined   Nose:   Nares normal, septum midline, mucosa normal, no drainage     or sinus tenderness   Throat:   Lips, mucosa, and tongue normal; teeth and gums normal,     mucous membranes moist   Neck:   Supple, Trachea midline   Back:     Symmetric, no curvature, ROM normal, no CVA tenderness   Lungs:     Clear to auscultation bilaterally, respirations unlabored   Chest Wall:    tenderness on palpation of the chest wall in the upper quadrant of the left breast.  No palpable masses.  There is slight nipple retraction on the right breast.      Heart:    Regular rate and rhythm, S1 and S2 normal, no murmur, rub    or gallop    Abdomen:     Soft, non-tender, bowel sounds active all four quadrants,     no masses, no organomegaly   Extremities:   Extremities normal, atraumatic, no cyanosis or edema       SKIN erythematous and scaly and hyperpigmented scaly rash noted on the lower breasts and upper abdomen         Cervical, supraclavicular, and axillary nodes normal   Neurologic:   Grossly nonfocal, gait coordinated and smooth, cognition is   preserved.       RECENT LABS:  Results for orders placed or performed in visit on 01/24/17   Comprehensive Metabolic Panel   Result Value Ref Range    Glucose 131 (H) 75 - 110 mg/dL    BUN 17 7 - 26 mg/dL    Creatinine 1.00 0.70 - 1.40 mg/dL    Sodium 145 137 - 145 mmol/L    Potassium 4.2 3.6 - 5.0 mmol/L    Chloride 103 98 - 107 mmol/L    CO2 30.0 22.0 - 30.0 mmol/L    Calcium 8.9 8.4 - 10.2 mg/dL    Total Protein 8.0 6.3 - 8.2 g/dL    Albumin 4.00 3.50 - 5.00 g/dL    ALT (SGPT) 19 9 - 52 U/L    AST (SGOT) 25 5 - 40 U/L    Alkaline Phosphatase 93 38 - 126 U/L    Total Bilirubin 0.6 0.2 - 1.3 mg/dL    eGFR Non African Amer 57 (L) >60 mL/min/1.73    Globulin 4.0 gm/dL    A/G Ratio 1.0 g/dL    BUN/Creatinine Ratio 17.0 7.0 - 25.0    Anion Gap 12.0 mmol/L   CBC Auto Differential   Result Value Ref Range    WBC 6.40 4.80 - 10.80 10*3/mm3    RBC 4.48 4.20 - 5.40 10*6/mm3    Hemoglobin 11.6 (L) 12.0 - 16.0 g/dL    Hematocrit 39.6 37.0 - 47.0 %    MCV 88.5 81.0 - 99.0 fL    MCH 25.9 (L) 27.0 - 31.0 pg    MCHC 29.3 (L) 33.0 - 37.0 g/dL    RDW 16.3 (H) 11.5 - 14.5 %    MPV 8.1 6.0 - 12.0 fL    Platelets 310 130 - 400 10*3/mm3    Neutrophil % 60.6 37.0 - 92.0 %    Lymphocyte % 32.8 10.0 - 58.5 %    Auto Mixed Cells % 6.6 0.2 - 15.1 %    Neutrophils, Absolute 3.90 2.00 - 7.80 10*3/mm3    Lymphocytes, Absolute 2.10 0.80 - 7.00 10*3/mm3    Auto Mixed Cells # 0.40 0.10 - 1.50 10*3/uL   CEA   Result Value Ref Range    CEA 1.9 0.0 - 4.7 ng/mL   Cancer Antigen 27.29   Result Value Ref Range    CA 27.29 9.5 0.0 - 38.6  U/mL             Assessment/Plan     Patient Active Problem List   Diagnosis   • Tinea corporis   • Anemia   • Sarcoma of breast   • Palpitations   • HTN (hypertension)   • Atypical chest pain   • Type 2 diabetes mellitus without complication, without long-term current use of insulin        There are no diagnoses linked to this encounter.   1.  History of Metaplastic sarcomatoid right breast carcinoma diagnosed 2010. Recurrence was found on mammogram and ultrasound and a repeat  resection on 10/16/2012 was performed with a positive margin. Repeat surgery 11/30/2012. Biopsy report on 12/26/2015 shows healing  excisional wound negative for residual tumor.  Since then patient has been disease free.  2.  Left breast pain: Physical exam reveals tenderness on the deep chest wall in the upper part of the left breast.  Patient had an ultrasound recently and it is apparently negative.  We will obtain that report.  On exam, I can feel some tender soft tissue seems to be pectoralis muscle    She has been complaining of pain in the right breast area for the last few weeks I will repeat another ultrasound and a mammogram as well as a chest x-ray and a plain x-ray of the right shoulder to return to clinic in 1 month's time.  Borderline anemia watchful waiting is recommended.              Rashawn Maloney MD    7/12/2017    2:56 PM

## 2017-07-20 ENCOUNTER — TELEPHONE (OUTPATIENT)
Dept: NEUROSURGERY | Age: 57
End: 2017-07-20

## 2017-08-30 ENCOUNTER — TELEPHONE (OUTPATIENT)
Dept: NEUROSURGERY | Age: 57
End: 2017-08-30

## 2017-09-20 ENCOUNTER — OFFICE VISIT (OUTPATIENT)
Dept: NEUROSURGERY | Age: 57
End: 2017-09-20
Payer: MEDICAID

## 2017-09-20 VITALS
OXYGEN SATURATION: 98 % | SYSTOLIC BLOOD PRESSURE: 134 MMHG | WEIGHT: 293 LBS | DIASTOLIC BLOOD PRESSURE: 78 MMHG | HEART RATE: 64 BPM | HEIGHT: 63 IN | BODY MASS INDEX: 51.91 KG/M2

## 2017-09-20 DIAGNOSIS — M25.511 CHRONIC RIGHT SHOULDER PAIN: ICD-10-CM

## 2017-09-20 DIAGNOSIS — M54.2 NECK PAIN: Primary | ICD-10-CM

## 2017-09-20 DIAGNOSIS — G89.29 CHRONIC RIGHT SHOULDER PAIN: ICD-10-CM

## 2017-09-20 PROCEDURE — 99204 OFFICE O/P NEW MOD 45 MIN: CPT | Performed by: PSYCHIATRY & NEUROLOGY

## 2017-09-20 RX ORDER — METHOCARBAMOL 500 MG/1
500 TABLET, FILM COATED ORAL PRN
COMMUNITY
Start: 2017-05-04 | End: 2019-05-01

## 2017-09-20 RX ORDER — ALBUTEROL SULFATE 90 UG/1
AEROSOL, METERED RESPIRATORY (INHALATION) DAILY PRN
COMMUNITY

## 2017-09-20 RX ORDER — FUROSEMIDE 40 MG/1
40 TABLET ORAL PRN
COMMUNITY

## 2017-09-20 RX ORDER — ASPIRIN 81 MG/1
81 TABLET ORAL
COMMUNITY

## 2017-09-20 RX ORDER — CLONIDINE HYDROCHLORIDE 0.1 MG/1
0.1 TABLET ORAL PRN
COMMUNITY
End: 2022-03-23

## 2017-09-20 RX ORDER — LISINOPRIL 40 MG/1
40 TABLET ORAL DAILY
COMMUNITY
Start: 2017-09-16

## 2017-09-20 RX ORDER — OXYCODONE AND ACETAMINOPHEN 7.5; 325 MG/1; MG/1
TABLET ORAL EVERY 6 HOURS PRN
COMMUNITY
Start: 2017-08-26 | End: 2017-11-29 | Stop reason: CLARIF

## 2017-09-20 RX ORDER — AMLODIPINE BESYLATE 5 MG/1
5 TABLET ORAL DAILY
COMMUNITY
Start: 2017-04-19

## 2017-09-20 RX ORDER — ATENOLOL 50 MG/1
25 TABLET ORAL 2 TIMES DAILY
COMMUNITY
Start: 2017-08-04 | End: 2018-07-27 | Stop reason: ALTCHOICE

## 2017-10-11 ENCOUNTER — TELEPHONE (OUTPATIENT)
Dept: NEUROSURGERY | Age: 57
End: 2017-10-11

## 2017-10-11 NOTE — TELEPHONE ENCOUNTER
Called and spoke with patient to let her when her MRI and ncs-emg appointment has been scheduled. Patient is aware of the appointment time/date/location.

## 2017-10-19 ENCOUNTER — HOSPITAL ENCOUNTER (OUTPATIENT)
Dept: NEUROLOGY | Age: 57
Discharge: HOME | End: 2017-10-19
Payer: MEDICAID

## 2017-10-19 ENCOUNTER — HOSPITAL ENCOUNTER (OUTPATIENT)
Dept: MRI IMAGING | Age: 57
Discharge: HOME | End: 2017-10-19
Payer: MEDICAID

## 2017-10-19 DIAGNOSIS — M54.2 NECK PAIN: ICD-10-CM

## 2017-10-19 DIAGNOSIS — M25.511 CHRONIC RIGHT SHOULDER PAIN: ICD-10-CM

## 2017-10-19 DIAGNOSIS — G89.29 CHRONIC RIGHT SHOULDER PAIN: ICD-10-CM

## 2017-10-19 PROCEDURE — 95886 MUSC TEST DONE W/N TEST COMP: CPT | Performed by: PSYCHIATRY & NEUROLOGY

## 2017-10-19 PROCEDURE — 72141 MRI NECK SPINE W/O DYE: CPT

## 2017-10-19 PROCEDURE — 95886 MUSC TEST DONE W/N TEST COMP: CPT

## 2017-10-19 PROCEDURE — 95909 NRV CNDJ TST 5-6 STUDIES: CPT | Performed by: PSYCHIATRY & NEUROLOGY

## 2017-10-19 PROCEDURE — 95909 NRV CNDJ TST 5-6 STUDIES: CPT

## 2017-11-29 ENCOUNTER — OFFICE VISIT (OUTPATIENT)
Dept: NEUROSURGERY | Age: 57
End: 2017-11-29
Payer: MEDICAID

## 2017-11-29 VITALS
OXYGEN SATURATION: 96 % | SYSTOLIC BLOOD PRESSURE: 134 MMHG | BODY MASS INDEX: 51.84 KG/M2 | WEIGHT: 292.6 LBS | DIASTOLIC BLOOD PRESSURE: 60 MMHG | HEIGHT: 63 IN | HEART RATE: 66 BPM

## 2017-11-29 DIAGNOSIS — G89.29 CHRONIC RIGHT SHOULDER PAIN: ICD-10-CM

## 2017-11-29 DIAGNOSIS — M54.2 NECK PAIN: Primary | ICD-10-CM

## 2017-11-29 DIAGNOSIS — M25.511 CHRONIC RIGHT SHOULDER PAIN: ICD-10-CM

## 2017-11-29 PROCEDURE — 99214 OFFICE O/P EST MOD 30 MIN: CPT | Performed by: PSYCHIATRY & NEUROLOGY

## 2017-11-29 PROCEDURE — G8427 DOCREV CUR MEDS BY ELIG CLIN: HCPCS | Performed by: PSYCHIATRY & NEUROLOGY

## 2017-11-29 PROCEDURE — 1036F TOBACCO NON-USER: CPT | Performed by: PSYCHIATRY & NEUROLOGY

## 2017-11-29 PROCEDURE — G8417 CALC BMI ABV UP PARAM F/U: HCPCS | Performed by: PSYCHIATRY & NEUROLOGY

## 2017-11-29 PROCEDURE — 3014F SCREEN MAMMO DOC REV: CPT | Performed by: PSYCHIATRY & NEUROLOGY

## 2017-11-29 PROCEDURE — G8484 FLU IMMUNIZE NO ADMIN: HCPCS | Performed by: PSYCHIATRY & NEUROLOGY

## 2017-11-29 PROCEDURE — 3017F COLORECTAL CA SCREEN DOC REV: CPT | Performed by: PSYCHIATRY & NEUROLOGY

## 2017-11-29 RX ORDER — HYDROCODONE BITARTRATE AND ACETAMINOPHEN 7.5; 325 MG/1; MG/1
1 TABLET ORAL 2 TIMES DAILY
COMMUNITY
Start: 2017-11-25

## 2017-11-29 NOTE — PROGRESS NOTES
Denies all unless marked  Skin:[] Rash [] Pallor [] Color Change [] Wound  [x] Denies all unless marked  Neurological:[x] Visual Disturbance [x] Double Vision [] Slurred Speech [] Trouble swallowing  [] Vertigo [x] Tingling [] Numbness [] Weakness [x] Loss of Balance [] Loss of Consciousness [x] Memory Loss [] Tremor [] Seizure [] Syncope  [] Ataxia  [x] Denies all unless marked  Psychiatric/Behavioral:[x] Depression [] Anxiety [] Confusion [] Agitation [] Behavior Problems  [] Hallucinations  [] Suicidal idiation  [x] Denies all unless marked  Sleep: []  Insomnia [] Sleep Disturbance [] Snoring [] Restless Legs [] Daytime Sleeping [] Sleep Apnea  [x] Denies all unless marked  Hematological:[] Adenopathy [] Bruises/Bleeds Easily  [x] Denies all unless marked  Endocrine: [] Cold Intolerance [] Heat Intolerance [] Polydipsia [] Polyphagia [] Polyuria  [x]Denies all unless marked  Allergic/Immunologic:[] Environmental Allergies [] Food Allergies [] Immunocompromised state  [x] Denies all unless marked    PHYSICAL EXAM  /60   Pulse 66   Ht 5' 3\" (1.6 m)   Wt 292 lb 9.6 oz (132.7 kg)   SpO2 96%   BMI 51.83 kg/m²     Constitutional - No acute distress    HEENT- Conjunctiva normal.  No scars, masses, or lesions over external nose or ears  Pulmonary- Clear to auscultation, good expansion, normal effort without use of accessory mucles  Musculoskeletal - No significant wasting of muscles noted, no bony deformities  Extremities - No clubbing, cyanosis or edema  Skin - Warm, dry, and intact. No rash, erythema, or pallor  Psychiatric - Mood, affect, and behavior appear normal      NEUROLOGICAL EXAM    Mental status   [x]Awake, alert, oriented   [x]Affect attention and concentration appear appropriate  [x]Recent and remote memory appears unremarkable  [x]Speech normal without dysarthria or aphasia, comprehension and repetition intact.    COMMENTS:    Cranial Nerves [x]No VF deficit to confrontation,  no papilledema on

## 2018-04-11 ENCOUNTER — OFFICE VISIT (OUTPATIENT)
Dept: NEUROSURGERY | Age: 58
End: 2018-04-11
Payer: MEDICAID

## 2018-04-11 VITALS
BODY MASS INDEX: 50.82 KG/M2 | WEIGHT: 286.8 LBS | HEIGHT: 63 IN | HEART RATE: 65 BPM | DIASTOLIC BLOOD PRESSURE: 86 MMHG | SYSTOLIC BLOOD PRESSURE: 150 MMHG

## 2018-04-11 DIAGNOSIS — G89.29 CHRONIC RIGHT SHOULDER PAIN: Primary | ICD-10-CM

## 2018-04-11 DIAGNOSIS — G56.01 CARPAL TUNNEL SYNDROME ON RIGHT: ICD-10-CM

## 2018-04-11 DIAGNOSIS — M25.511 CHRONIC RIGHT SHOULDER PAIN: Primary | ICD-10-CM

## 2018-04-11 PROBLEM — R07.89 ATYPICAL CHEST PAIN: Status: ACTIVE | Noted: 2017-04-19

## 2018-04-11 PROBLEM — B35.4 TINEA CORPORIS: Status: ACTIVE | Noted: 2017-01-10

## 2018-04-11 PROBLEM — I10 HTN (HYPERTENSION): Status: ACTIVE | Noted: 2017-01-31

## 2018-04-11 PROBLEM — C50.919 SARCOMA OF BREAST (HCC): Status: ACTIVE | Noted: 2017-01-10

## 2018-04-11 PROBLEM — R00.2 PALPITATIONS: Status: ACTIVE | Noted: 2017-01-31

## 2018-04-11 PROBLEM — E11.9 TYPE 2 DIABETES MELLITUS WITHOUT COMPLICATION, WITHOUT LONG-TERM CURRENT USE OF INSULIN (HCC): Status: ACTIVE | Noted: 2017-04-19

## 2018-04-11 PROBLEM — D64.9 ANEMIA: Status: ACTIVE | Noted: 2017-01-10

## 2018-04-11 PROCEDURE — 99213 OFFICE O/P EST LOW 20 MIN: CPT | Performed by: NURSE PRACTITIONER

## 2018-04-11 RX ORDER — MONTELUKAST SODIUM 10 MG/1
1 TABLET ORAL DAILY
COMMUNITY
Start: 2018-02-07

## 2018-04-19 ENCOUNTER — OUTSIDE FACILITY SERVICE (OUTPATIENT)
Dept: CARDIOLOGY | Facility: CLINIC | Age: 58
End: 2018-04-19

## 2018-05-15 RX ORDER — AMLODIPINE BESYLATE 5 MG/1
TABLET ORAL
Qty: 30 TABLET | Refills: 3 | Status: SHIPPED | OUTPATIENT
Start: 2018-05-15

## 2018-07-12 ENCOUNTER — TELEPHONE (OUTPATIENT)
Dept: CARDIOLOGY | Age: 58
End: 2018-07-12

## 2018-07-18 ENCOUNTER — OFFICE VISIT (OUTPATIENT)
Dept: CARDIOLOGY | Facility: CLINIC | Age: 58
End: 2018-07-18

## 2018-07-18 VITALS
SYSTOLIC BLOOD PRESSURE: 148 MMHG | DIASTOLIC BLOOD PRESSURE: 85 MMHG | RESPIRATION RATE: 18 BRPM | HEART RATE: 63 BPM | HEIGHT: 63 IN | BODY MASS INDEX: 50.85 KG/M2 | OXYGEN SATURATION: 98 % | WEIGHT: 287 LBS

## 2018-07-18 DIAGNOSIS — E11.9 TYPE 2 DIABETES MELLITUS WITHOUT COMPLICATION, WITHOUT LONG-TERM CURRENT USE OF INSULIN (HCC): ICD-10-CM

## 2018-07-18 DIAGNOSIS — R00.2 PALPITATIONS: Primary | ICD-10-CM

## 2018-07-18 DIAGNOSIS — I10 ESSENTIAL HYPERTENSION: ICD-10-CM

## 2018-07-18 DIAGNOSIS — R07.9 CHEST PAIN, UNSPECIFIED TYPE: ICD-10-CM

## 2018-07-18 DIAGNOSIS — R06.09 DYSPNEA ON EXERTION: ICD-10-CM

## 2018-07-18 PROCEDURE — 93000 ELECTROCARDIOGRAM COMPLETE: CPT | Performed by: PHYSICIAN ASSISTANT

## 2018-07-18 PROCEDURE — 99214 OFFICE O/P EST MOD 30 MIN: CPT | Performed by: PHYSICIAN ASSISTANT

## 2018-07-18 RX ORDER — HYDROCODONE BITARTRATE AND ACETAMINOPHEN 7.5; 325 MG/1; MG/1
1 TABLET ORAL EVERY 6 HOURS PRN
COMMUNITY

## 2018-07-18 RX ORDER — POTASSIUM CHLORIDE 20 MEQ/1
20 TABLET, EXTENDED RELEASE ORAL DAILY
COMMUNITY

## 2018-07-18 RX ORDER — CARVEDILOL 12.5 MG/1
12.5 TABLET ORAL 2 TIMES DAILY
Qty: 60 TABLET | Refills: 5 | Status: SHIPPED | OUTPATIENT
Start: 2018-07-18 | End: 2019-10-24 | Stop reason: HOSPADM

## 2018-07-18 NOTE — PROGRESS NOTES
"    Subjective:     Encounter Date:07/18/2018      Patient ID: Patti Gilbert is a 58 y.o. female wh x of palpitations, DM2, HTN, obesity who [facility]. She notes she was recently evaluated at [facility] ED for chest pain. She tells me that she was sitting at home and began having sharp substernal pain with radiation to arm. She reports she took a NTG with some relief. ACS was ruled out at ED. Since this time she notes that she has had several recurrences of chest pain, both with rest and exertion. She describes increased exertional dyspnea and occasional palpitations/heart racing. She also notes a \"nodule\" of her right breast she is concerned about.    Chief Complaint:  Chest Pain    This is a recurrent problem. The current episode started more than 1 month ago. The onset quality is sudden. The problem occurs every several days. The problem has been gradually worsening. The pain is present in the substernal region. The pain is at a severity of 6/10. The pain is moderate. The quality of the pain is described as sharp. The pain radiates to the right arm and left arm. Associated symptoms include malaise/fatigue and palpitations. Pertinent negatives include no claudication, dizziness, hemoptysis, irregular heartbeat, nausea, near-syncope, orthopnea, PND, shortness of breath, syncope or vomiting. The pain is aggravated by walking, emotional upset and exertion. She has tried rest and nitroglycerin for the symptoms. The treatment provided moderate relief. Risk factors include obesity, lack of exercise and post-menopausal.   Pertinent negatives for past medical history include no CAD and no heart disease.   Her family medical history is significant for CAD. Prior diagnostic workup includes stress echo.   Palpitations    This is a recurrent problem. The current episode started more than 1 year ago. The problem occurs every several days. The problem has been unchanged. Nothing aggravates the symptoms. " Associated symptoms include chest pain and malaise/fatigue. Pertinent negatives include no dizziness, irregular heartbeat, nausea, near-syncope, shortness of breath, syncope or vomiting. She has tried deep relaxation for the symptoms. The treatment provided significant relief. Risk factors include diabetes mellitus, obesity and post menopause. There is no history of heart disease.   Obesity   This is a chronic problem. The current episode started more than 1 year ago. The problem occurs constantly. The problem has been unchanged. Associated symptoms include chest pain. Pertinent negatives include no myalgias, nausea or vomiting. The symptoms are aggravated by eating. She has tried nothing for the symptoms.       The following portions of the patient's history were reviewed and updated as appropriate: allergies, current medications, past family history, past medical history, past social history, past surgical history and problem list.    Review of Systems   Constitution: Positive for malaise/fatigue. Negative for weight gain.   Cardiovascular: Positive for chest pain, dyspnea on exertion and palpitations. Negative for claudication, irregular heartbeat, leg swelling, near-syncope, orthopnea, paroxysmal nocturnal dyspnea and syncope.   Respiratory: Negative for hemoptysis and shortness of breath.    Hematologic/Lymphatic: Negative for bleeding problem.   Skin: Negative for poor wound healing.   Musculoskeletal: Negative for myalgias.   Gastrointestinal: Negative for melena, nausea and vomiting.   Genitourinary: Negative for hematuria.   Neurological: Negative for dizziness, focal weakness and light-headedness.   Psychiatric/Behavioral: Negative for memory loss.   All other systems reviewed and are negative.        ECG 12 Lead  Date/Time: 7/18/2018 4:04 PM  Performed by: JONATHAN CALABRESE  Authorized by: JONATHAN CALABRESE   Comparison: compared with previous ECG from 4/19/2017  Similar to previous ECG  Rhythm: sinus  "rhythm  Rate: normal  QRS axis: left  Other findings: PRWP               Objective:     Physical Exam   Constitutional: She is oriented to person, place, and time. She appears well-developed and well-nourished.   Morbidly obese   HENT:   Head: Normocephalic and atraumatic.   Eyes: Pupils are equal, round, and reactive to light. Conjunctivae and EOM are normal.   Neck: Normal range of motion. Neck supple. No JVD present.   Cardiovascular: Normal rate, regular rhythm, S1 normal, S2 normal, normal heart sounds, intact distal pulses and normal pulses.    No murmur heard.  Pulmonary/Chest: Effort normal and breath sounds normal. No respiratory distress.   Abdominal: Soft. Bowel sounds are normal. She exhibits no distension.   Musculoskeletal: She exhibits no edema or tenderness.   Neurological: She is alert and oriented to person, place, and time.   Skin: Skin is warm and dry.   Psychiatric: She has a normal mood and affect. Judgment normal.   Vitals reviewed.      /85 (BP Location: Left arm, Patient Position: Sitting, Cuff Size: Infant)   Pulse 63   Resp 18   Ht 160 cm (63\")   Wt 130 kg (287 lb)   LMP  (LMP Unknown)   SpO2 98%   Breastfeeding? No   BMI 50.84 kg/m²     Current Outpatient Prescriptions:   •  albuterol (PROVENTIL HFA) 108 (90 BASE) MCG/ACT inhaler, Inhale., Disp: , Rfl:   •  amLODIPine (NORVASC) 5 MG tablet, TAKE ONE TABLET BY MOUTH EVERY DAY, Disp: 30 tablet, Rfl: 3  •  aspirin (ASPIR) 81 MG EC tablet, Take 81 mg by mouth Daily., Disp: , Rfl:   •  CloNIDine (CATAPRES) 0.1 MG tablet, Take 0.1 mg by mouth As Needed for high blood pressure., Disp: , Rfl:   •  furosemide (LASIX) 40 MG tablet, Take 40 mg by mouth As Needed., Disp: , Rfl:   •  HYDROcodone-acetaminophen (NORCO) 7.5-325 MG per tablet, Take 1 tablet by mouth Every 6 (Six) Hours As Needed for Moderate Pain ., Disp: , Rfl:   •  lisinopril (PRINIVIL,ZESTRIL) 40 MG tablet, Take 40 mg by mouth Daily., Disp: , Rfl:   •  metFORMIN " (GLUCOPHAGE) 500 MG tablet, Take 500 mg by mouth As Needed., Disp: , Rfl:   •  methocarbamol (ROBAXIN) 500 MG tablet, As Needed., Disp: , Rfl:   •  potassium chloride (K-DUR,KLOR-CON) 20 MEQ CR tablet, Take 20 mEq by mouth Every Other Day., Disp: , Rfl:   •  carvedilol (COREG) 12.5 MG tablet, Take 1 tablet by mouth 2 (Two) Times a Day., Disp: 60 tablet, Rfl: 5  Past Medical History:   Diagnosis Date   • Atrial septal defect     2232010   • Breast cancer (CMS/HCC)    • COPD (chronic obstructive pulmonary disease) (CMS/[facility])    • Diabetes mellitus (CMS/[facility])    • H/O cardiac catheterization    • History of left fractured kneecap    • Hypertension    • Mini Stroke    • Morbid obesity (CMS/[facility])    • Myocardial infarction    • Recretional Drug use    • Right knee injury      Past Surgical History:   Procedure Laterality Date   • BREAST MASS EXCISION      2010   • CARDIAC SURGERY      Closure of ASD   • GANGLION CYST EXCISION     • KNEE ARTHROSCOPY Right 01/13/2009   • ROTATOR CUFF REPAIR     • TUBAL ABDOMINAL LIGATION Bilateral      Allergies   Allergen Reactions   • Cefdinir    • Levofloxacin      Social History     Social History   • Marital status:      Spouse name: N/A   • Number of children: N/A   • Years of education: N/A     Occupational History   • Not on file.     Social History Main Topics   • Smoking status: Never Smoker   • Smokeless tobacco: Never Used   • Alcohol use Yes      Comment: occ.   • Drug use: Yes     Types: Oxycodone   • Sexual activity: Defer     Other Topics Concern   • Not on file     Social History Narrative   • No narrative on file     Family History   Problem Relation Age of Onset   • Alzheimer's disease Mother    • Cancer Mother    • Heart disease Father    • Cancer Father    • No Known Problems Sister    • Heart failure Brother    • Diabetes Sister    • Diabetes Brother      Patient's Body mass index is 50.84 kg/m². BMI is above normal parameters. Recommendations include: exercise  counseling and nutrition counseling.        Assessment:          Diagnosis Plan   1. Palpitations     2. Essential hypertension     3. Type 2 diabetes mellitus without complication, without long-term current use of insulin (CMS/[facility])     4. Dyspnea on exertion  Adult Stress Echo W/ Cont or Stress Agent if Necessary Per Protocol   5. Chest pain, unspecified type  Adult Stress Echo W/ Cont or Stress Agent if Necessary Per Protocol          Plan:     1. While chest pain is somewhat atypical, she notes worsening in this and exertional dyspnea. She has multiple risk factors for CAD; therefore will order DSE  2. If DSE is negative we will refer her to PCP for noncardiac chest pain  3. DC atenolol. Begin Coreg for better BP control; follow-up with PCP regarding this. Instructed her to see her PCP regarding breast nodule.  4. Pt wants to keep follow-up with Dr. Dickson next month  5. Verbalized understanding of instructions.

## 2018-07-25 ENCOUNTER — HOSPITAL ENCOUNTER (OUTPATIENT)
Dept: CARDIOLOGY | Facility: HOSPITAL | Age: 58
End: 2018-07-25

## 2018-07-27 ENCOUNTER — OFFICE VISIT (OUTPATIENT)
Dept: NEUROSURGERY | Age: 58
End: 2018-07-27
Payer: MEDICAID

## 2018-07-27 VITALS
SYSTOLIC BLOOD PRESSURE: 146 MMHG | BODY MASS INDEX: 49.82 KG/M2 | HEART RATE: 59 BPM | HEIGHT: 63 IN | WEIGHT: 281.2 LBS | DIASTOLIC BLOOD PRESSURE: 89 MMHG

## 2018-07-27 DIAGNOSIS — R51.9 NONINTRACTABLE HEADACHE, UNSPECIFIED CHRONICITY PATTERN, UNSPECIFIED HEADACHE TYPE: Primary | ICD-10-CM

## 2018-07-27 DIAGNOSIS — M54.2 NECK PAIN: ICD-10-CM

## 2018-07-27 PROCEDURE — 99213 OFFICE O/P EST LOW 20 MIN: CPT | Performed by: NURSE PRACTITIONER

## 2018-07-27 PROCEDURE — 1036F TOBACCO NON-USER: CPT | Performed by: NURSE PRACTITIONER

## 2018-07-27 PROCEDURE — G8427 DOCREV CUR MEDS BY ELIG CLIN: HCPCS | Performed by: NURSE PRACTITIONER

## 2018-07-27 PROCEDURE — G8417 CALC BMI ABV UP PARAM F/U: HCPCS | Performed by: NURSE PRACTITIONER

## 2018-07-27 PROCEDURE — 3017F COLORECTAL CA SCREEN DOC REV: CPT | Performed by: NURSE PRACTITIONER

## 2018-07-27 RX ORDER — CETIRIZINE HYDROCHLORIDE 10 MG/1
10 TABLET ORAL DAILY
COMMUNITY
End: 2022-03-23

## 2018-07-27 RX ORDER — CARVEDILOL 12.5 MG/1
12.5 TABLET ORAL 2 TIMES DAILY
COMMUNITY
Start: 2018-07-18 | End: 2019-05-01

## 2018-07-27 RX ORDER — POTASSIUM CHLORIDE 20 MEQ/1
20 TABLET, EXTENDED RELEASE ORAL DAILY
COMMUNITY

## 2018-07-27 NOTE — PROGRESS NOTES
[facility] Neurology Office Note      Patient:   Maurice Cosby  MR#:    [id number]  Account Number:                         YOB: 1960  Date of Evaluation:  7/27/2018  Time of Note:                          10:59 AM  Primary/Referring Physician:  [address] [place] [address] Physician:  JULIAN Bright    FOLLOW UP VISIT    Chief Complaint   Patient presents with    Headache     Pt notes increase in headache intesnity and frequency since May. Notes increased stressors.  Memory Loss     Pt reports memory loss from episodes.  Dizziness     Pt reports dizziness during headaches.  Facial Pain     Pt reports pain on rt side of face along jaw line and ear. HISTORY OF PRESENT ILLNESS    Maurice Cosby is a 60 y.o. female here for follow up of headaches, right arm and shoulder pain and carpal tunnel syndrome. Shoulder pain has improved, had surgery in April. Currently in physical therapy. She reports an increase in stressors and anxiety. She does note an increase in headaches recently, correlates it to increase in stress. Headache pain is bilateral occipital areas with radiation posteriorly. She does note some radiation of pain into her ears and jaws at times. Denies jaw claudication, temporal artery pain or dany visual loss. Some nausea noted, some light and sound sensitivity with headaches. Typically last for several hours to all day. Tylenol and Ibuprofen typically help along with trying to de-stress. CTS is about the same, no worsening. Hasn't recently been wearing splints. She notes intermittent dizziness, no clear triggers. She also notes some memory loss, says she has to stop and think about things at times. Correlates some memory issues with increased stressed as well. No overt issues with ADLs. No issues with medication management. Neck pain is about the same, no overt worsening. No other complaints today.       Past Medical History:   Diagnosis Date    Anxiety     Chronic back pain     GERD (gastroesophageal reflux disease)     Heart valve disorder     Hypertension     Type 2 diabetes mellitus without complication (HCC)        Past Surgical History:   Procedure Laterality Date    JOINT REPLACEMENT Bilateral     KNEE SURGERY Bilateral     knee replacement    ROTATOR CUFF REPAIR Right        Family History   Problem Relation Age of Onset    Cancer Mother     Cancer Father     Heart Disease Father     Diabetes Maternal Grandmother     Heart Disease Maternal Grandmother        Social History     Social History    Marital status:      Spouse name: N/A    Number of children: N/A    Years of education: N/A     Occupational History    Not on file. Social History Main Topics    Smoking status: Never Smoker    Smokeless tobacco: Never Used    Alcohol use Yes      Comment: occ    Drug use: No    Sexual activity: Yes     Partners: Male     Other Topics Concern    Not on file     Social History Narrative    No narrative on file       Current Outpatient Prescriptions   Medication Sig Dispense Refill    carvedilol (COREG) 12.5 MG tablet Take 12.5 mg by mouth 2 times daily      potassium chloride (KLOR-CON M) 20 MEQ extended release tablet Take 20 mEq by mouth daily      cetirizine (ZYRTEC) 10 MG tablet Take 10 mg by mouth daily      montelukast (SINGULAIR) 10 MG tablet Take 1 tablet by mouth daily      HYDROcodone-acetaminophen (NORCO) 7.5-325 MG per tablet Take 1 tablet by mouth 2 times daily .       albuterol sulfate  (90 Base) MCG/ACT inhaler Inhale into the lungs daily as needed       aspirin 81 MG EC tablet Take 81 mg by mouth      furosemide (LASIX) 40 MG tablet Take 40 mg by mouth as needed       cloNIDine (CATAPRES) 0.1 MG tablet Take 0.1 mg by mouth as needed       lisinopril (PRINIVIL;ZESTRIL) 40 MG tablet 40 mg daily       metFORMIN (GLUCOPHAGE) 500 MG tablet Take 500 mg by mouth as needed       amLODIPine (NORVASC) 5 MG tablet Take 5 mg by mouth daily       methocarbamol (ROBAXIN) 500 MG tablet 500 mg as needed        No current facility-administered medications for this visit. Allergies   Allergen Reactions    Cefdinir     Levofloxacin      REVIEW OF SYSTEMS  Constitutional: []Fever [x]Sweat []Chills [x] Recent Injury [x] Denies all unless marked  HEENT:[x]Headache  [] Head Injury/Hearing Loss  [] Sore Throat  [x] Ear Ache/Dizziness  [x] Denies all unless marked  Spine:  [x] Neck pain  [x] Back pain  [x] Sciaticia  [x] Denies all unless marked  Cardiovascular:[x]Heart Disease [x]Chest Pain [x] Palpitations  [x] Denies all unless marked  Pulmonary: [x]Shortness of Breath [x]Cough   [x] Denies all unless marke  Gastrointestinal: []Nausea  []Vomiting  [x]Abdominal Pain  [x]Constipation  [x]Diarrhea  []Dark Bloody Stools  [x] Denies all unless marked  Psychiatric/Behavioral:[x] Depression [x] Anxiety [x] Denies all unless marked  Genitourinary:   [x] Frequency  [] Urgency  [] Incontinence [] Pain with Urination  [x] Denies all unless marked  Extremities: [x]Pain  [x]Swelling  [x] Denies all unless marked  Musculoskeletal: [x] Muscle Pain  [x] Joint Pain  [x] Arthritis [x] Muscle Cramps [x] Muscle Twitches  [x] Denies all unless marked  Sleep: [x] Insomnia [] Snoring [] Restless Legs [x] Sleep Apnea  [x] Daytime Sleepiness  [x] Denies all unless marked  Skin:[] Rash [] Skin Discoloration [x] Denies all unless marked   Neurological: [x]Visual Disturbance/Memory Loss [x] Loss of Balance [x] Slurred Speech/Weakness [] Seizures  [x] Vertigo/Dizziness [x] Denies all unless marked    The MA has completed the ROS with the patient. I have reviewed it in its' entirety with the patient and agree with the documentation.       PHYSICAL EXAM  BP (!) 146/89   Pulse 59   Ht 5' 3\" (1.6 m)   Wt 281 lb 3.2 oz (127.6 kg)   BMI 49.81 kg/m²     Constitutional - No acute distress    HEENT- Conjunctiva normal.  No scars, masses, or lesions over external nose or ears  Pulmonary- Good expansion, normal effort without use of accessory mucles  Musculoskeletal - No significant wasting of muscles noted, no bony deformities  Extremities - No clubbing, cyanosis or edema  Skin - Warm, dry, and intact. No rash, erythema, or pallor  Psychiatric - Mood, affect, and behavior appear normal      NEUROLOGICAL EXAM    Mental status   [x]Awake, alert, oriented   [x]Affect attention and concentration appear appropriate  [x]Recent and remote memory appears unremarkable  [x]Speech normal without dysarthria or aphasia, comprehension and repetition intact. COMMENTS:    Cranial Nerves [x]No VF deficit to confrontation,  no papilledema on fundoscopic exam.  [x]PERRLA, EOMI, no nystagmus, conjugate eye movements, no ptosis  [x]Face symmetric  [x]Facial sensation intact  [x]Tongue midline no atrophy or fasciculations present  [x]Palate midline, hearing to finger rub normal bilaterally  [x]Shoulder shrug and SCM testing normal bilaterally  COMMENTS:   Motor   [x]5/5 strength x 4 extremities  [x]Normal bulk and tone  [x]No tremor present  [x]No rigidity or bradykinesia noted  COMMENTS:4/5 proximal RUE due to shoulder pain   Sensory  [x]Sensation intact to light touch, pin prick, vibration, and proprioception BLE  []Sensation intact to light touch, pin prick, vibration, and proprioception BUE  COMMENTS: Decreased PP, vib BUE R>L   Coordination [x]FTN normal bilaterally   [x]HTS normal bilaterally  [x]DILLON normal bilaterally. COMMENTS:   Reflexes  [x]Symmetric and non-pathological  [x]Toes down going bilaterally  [x]No clonus present  COMMENTS:   Gait                  [x]Normal steady gait    []Ataxic    []Spastic     []Magnetic     []Shuffling  COMMENTS:       LABS RECORD AND IMAGING REVIEW (As below and per HPI)    Records reviewed. NCS/EMG with mild CTS. MRI cervical spine with ddd, spinal stenosis, nf narrowing worse at C5-C7.      ASSESSMENT:    Michele Roberts is a 60y.o. year old female here for follow up of right shoulder and arm pain, carpal tunnel syndrome, neck pain and headaches. Arm and shoulder pain has improved since surgery. She has had some worsening of carpal tunnel symptoms, however not wearing braces currently. Headaches are likely related to increase in stress recently, we'll plan for further workup with MRI brain however. Neck pain is the same as well, nothing clearly surgical on MRI. PLAN:  1. MRI brain  2. Consider surgical evaluation for cervical spine disease on down the line  3. Continue Norco through pain management  4. Continue following with Dr. Darren Cueva, orthopedic. 5.  Recommend bilateral wrist splints for carpal tunnel syndrome at night  6. Follow-up in 3 months, sooner if worsening    JULIAN Menendez    Note:  A total of >50% (>8 minutes) of 15 minutes was spent discussing the pathophysiology and treatment and/or coordination of care of the above diagnoses.

## 2018-08-07 ENCOUNTER — HOSPITAL ENCOUNTER (OUTPATIENT)
Dept: MRI IMAGING | Age: 58
Discharge: HOME | End: 2018-08-07
Payer: MEDICAID

## 2018-08-07 DIAGNOSIS — R51.9 NONINTRACTABLE HEADACHE, UNSPECIFIED CHRONICITY PATTERN, UNSPECIFIED HEADACHE TYPE: ICD-10-CM

## 2018-08-07 PROCEDURE — 70553 MRI BRAIN STEM W/O & W/DYE: CPT

## 2018-08-07 PROCEDURE — A9577 INJ MULTIHANCE: HCPCS | Performed by: NURSE PRACTITIONER

## 2018-08-07 PROCEDURE — 6360000004 HC RX CONTRAST MEDICATION: Performed by: NURSE PRACTITIONER

## 2018-08-07 RX ADMIN — GADOBENATE DIMEGLUMINE 20 ML: 529 INJECTION, SOLUTION INTRAVENOUS at 17:00

## 2018-08-29 ENCOUNTER — HOSPITAL ENCOUNTER (OUTPATIENT)
Dept: CARDIOLOGY | Facility: HOSPITAL | Age: 58
Discharge: HOME | End: 2018-08-29
Admitting: PHYSICIAN ASSISTANT

## 2018-08-29 VITALS
WEIGHT: 280 LBS | HEIGHT: 63 IN | BODY MASS INDEX: 49.61 KG/M2 | HEART RATE: 54 BPM | DIASTOLIC BLOOD PRESSURE: 84 MMHG | SYSTOLIC BLOOD PRESSURE: 141 MMHG

## 2018-08-29 DIAGNOSIS — R07.9 CHEST PAIN, UNSPECIFIED TYPE: ICD-10-CM

## 2018-08-29 DIAGNOSIS — R06.09 DYSPNEA ON EXERTION: ICD-10-CM

## 2018-08-29 LAB
BH CV STRESS BP STAGE 1: NORMAL
BH CV STRESS BP STAGE 2: NORMAL
BH CV STRESS BP STAGE 3: NORMAL
BH CV STRESS BP STAGE 4: NORMAL
BH CV STRESS DOB - ATROPINE STAGE 3: 1
BH CV STRESS DOB - ATROPINE STAGE 4: 1
BH CV STRESS DOSE DOBUTAMINE STAGE 1: 10
BH CV STRESS DOSE DOBUTAMINE STAGE 2: 20
BH CV STRESS DOSE DOBUTAMINE STAGE 3: 30
BH CV STRESS DOSE DOBUTAMINE STAGE 4: 40
BH CV STRESS DOSE DOBUTAMINE STAGE 5: 50
BH CV STRESS DURATION MIN STAGE 1: 3
BH CV STRESS DURATION MIN STAGE 2: 3
BH CV STRESS DURATION MIN STAGE 3: 3
BH CV STRESS DURATION MIN STAGE 4: 3
BH CV STRESS DURATION MIN STAGE 5: 1
BH CV STRESS DURATION SEC STAGE 1: 0
BH CV STRESS DURATION SEC STAGE 2: 0
BH CV STRESS DURATION SEC STAGE 3: 0
BH CV STRESS DURATION SEC STAGE 4: 0
BH CV STRESS DURATION SEC STAGE 5: 13
BH CV STRESS HR STAGE 1: 54
BH CV STRESS HR STAGE 2: 71
BH CV STRESS HR STAGE 3: 122
BH CV STRESS HR STAGE 4: 136
BH CV STRESS HR STAGE 5: 143
BH CV STRESS PROTOCOL 1: NORMAL
BH CV STRESS RECOVERY BP: NORMAL MMHG
BH CV STRESS RECOVERY HR: 100 BPM
BH CV STRESS STAGE 1: 1
BH CV STRESS STAGE 2: 2
BH CV STRESS STAGE 3: 3
BH CV STRESS STAGE 4: 4
BH CV STRESS STAGE 5: 5
MAXIMAL PREDICTED HEART RATE: 161 BPM
PERCENT MAX PREDICTED HR: 88.82 %
STRESS BASELINE BP: NORMAL MMHG
STRESS BASELINE HR: 54 BPM
STRESS PERCENT HR: 104 %
STRESS POST EXERCISE DUR MIN: 13 MIN
STRESS POST EXERCISE DUR SEC: 13 SEC
STRESS POST PEAK BP: NORMAL MMHG
STRESS POST PEAK HR: 143 BPM
STRESS TARGET HR: 137 BPM

## 2018-08-29 PROCEDURE — 93018 CV STRESS TEST I&R ONLY: CPT | Performed by: INTERNAL MEDICINE

## 2018-08-29 PROCEDURE — 25010000003 DOBUTAMINE PER 250 MG: Performed by: INTERNAL MEDICINE

## 2018-08-29 PROCEDURE — 93350 STRESS TTE ONLY: CPT | Performed by: INTERNAL MEDICINE

## 2018-08-29 PROCEDURE — 93350 STRESS TTE ONLY: CPT

## 2018-08-29 PROCEDURE — 93352 ADMIN ECG CONTRAST AGENT: CPT | Performed by: INTERNAL MEDICINE

## 2018-08-29 PROCEDURE — 25010000002 PERFLUTREN 6.52 MG/ML SUSPENSION: Performed by: INTERNAL MEDICINE

## 2018-08-29 PROCEDURE — 93017 CV STRESS TEST TRACING ONLY: CPT

## 2018-08-29 RX ORDER — DOBUTAMINE HYDROCHLORIDE 100 MG/100ML
10-50 INJECTION INTRAVENOUS CONTINUOUS
Status: DISCONTINUED | OUTPATIENT
Start: 2018-08-29 | End: 2018-08-30 | Stop reason: HOSPADM

## 2018-08-29 RX ADMIN — Medication 10 MCG/KG/MIN: at 10:26

## 2018-08-29 RX ADMIN — ATROPINE SULFATE 2 MG: 0.1 INJECTION, SOLUTION INTRAVENOUS at 10:59

## 2018-08-29 RX ADMIN — PERFLUTREN 8.48 MG: 6.52 INJECTION, SUSPENSION INTRAVENOUS at 10:26

## 2018-08-30 ENCOUNTER — DOCUMENTATION (OUTPATIENT)
Dept: CARDIOLOGY | Facility: CLINIC | Age: 58
End: 2018-08-30

## 2018-08-30 NOTE — PROGRESS NOTES
Pt. Stated she has stopped Coreg due to side effects and PCP started her on Atenolol. She states her BP has been good and she now feels better.

## 2018-09-27 ENCOUNTER — NURSE TRIAGE (OUTPATIENT)
Dept: CALL CENTER | Facility: HOSPITAL | Age: 58
End: 2018-09-27

## 2018-09-27 NOTE — TELEPHONE ENCOUNTER
"Caller was evaluated at [facility] ER on Sunday, diagnosised with Mastitis.  Started on antibiotic Augmentin.  Caller wanting to know if she can have dairy products with antibiotics. Info provided from Nursing Drug Handbook.  Caller also states there was no mention on drug info from pharmacy printout., but her sister told her not to drink milk or milk products with Augmentin. Advised no contraindication to Augmentin and milk products noted.        Reason for Disposition  • Health Information question, no triage required and triager able to answer question    Additional Information  • Negative: [1] Caller is not with the adult (patient) AND [2] reporting urgent symptoms  • Negative: Lab result questions  • Negative: Medication questions  • Negative: Caller cannot be reached by phone  • Negative: Caller has already spoken to PCP or another triager  • Negative: RN needs further essential information from caller in order to complete triage  • Negative: Requesting regular office appointment  • Negative: [1] Caller requesting NON-URGENT health information AND [2] PCP's office is the best resource    Answer Assessment - Initial Assessment Questions  1. REASON FOR CALL or QUESTION: \"What is your reason for calling today?\" or \"How can I best help you?\" or \"What question do you have that I can help answer?\"      Milk products with Augmentin    Protocols used: INFORMATION ONLY CALL-ADULT-      "

## 2018-09-28 ENCOUNTER — NURSE TRIAGE (OUTPATIENT)
Dept: CALL CENTER | Facility: HOSPITAL | Age: 58
End: 2018-09-28

## 2018-09-29 NOTE — TELEPHONE ENCOUNTER
"Reviewed guideline with caller, advises caller be seen within next 24 hours.     Reason for Disposition  • Abdomen is more swollen than usual    Additional Information  • Negative: [1] Abdomen pain is main symptom AND [2] adult male  • Negative: [1] Abdomen pain is main symptom AND [2] adult female  • Negative: Rectal bleeding or blood in stool is main symptom  • Negative: Rectal pain or itching is main symptom  • Negative: Constipation in a cancer patient who is currently (or recently) receiving chemotherapy or radiation therapy, or cancer patient who has metastatic or end-stage cancer and is receiving palliative care  • Negative: Patient sounds very sick or weak to the triager  • Negative: [1] Vomiting AND [2] abdomen looks much more swollen than usual  • Negative: [1] Vomiting AND [2] contains bile (green color)  • Negative: [1] Constant abdominal pain AND [2] present > 2 hours  • Negative: [1] Rectal pain or fullness from fecal impaction (rectum full of stool) AND [2] NOT better after SITZ bath, suppository or enema  • Negative: [1] Intermittent mild abdominal pain AND [2] fever    Answer Assessment - Initial Assessment Questions  1. STOOL PATTERN OR FREQUENCY: \"How often do you pass bowel movements (BMs)?\"  (Normal range: tid to q 3 days)  \"When was the last BM passed?\"        Usually every other day or every 3 days, haven't had a good BM since last Friday, today had some little julianna  2. STRAINING: \"Do you have to strain to have a BM?\"       Sort of  3. RECTAL PAIN: \"Does your rectum hurt when the stool comes out?\" If so, ask: \"Do you have hemorrhoids? How bad is the pain?\"  (Scale 1-10; or mild, moderate, severe)      no  4. STOOL COMPOSITION: \"Are the stools hard?\"       yes  5. BLOOD ON STOOLS: \"Has there been any blood on the toilet tissue or on the surface of the BM?\" If so, ask: \"When was the last time?\"       no  6. CHRONIC CONSTIPATION: \"Is this a new problem for you?\"  If no, ask: How long have you " "had this problem?\" (days, weeks, months)       yes  7. CHANGES IN DIET: \"Have there been any recent changes in your diet?\"       Hadn't eaten well for several days, then only eating soup. About 2 days ago started solid foods  8. MEDICATIONS: \"Have you been taking any new medications?\"      Augmentin  9. LAXATIVES: \"Have you been using any laxatives or enemas?\"  If yes, ask \"What, how often, and when was the last time?\"      Stool softener last night, one  10. CAUSE: \"What do you think is causing the constipation?\"         Don't know  11. OTHER SYMPTOMS: \"Do you have any other symptoms?\" (e.g., abdominal pain, fever, vomiting)        Abdominal cramping off and on, abdominal swollen  12. PREGNANCY: \"Is there any chance you are pregnant?\" \"When was your last menstrual period?\"        NA    Protocols used: CONSTIPATION-ADULT-      "

## 2018-10-11 ENCOUNTER — OFFICE VISIT (OUTPATIENT)
Dept: SURGERY | Age: 58
End: 2018-10-11
Payer: MEDICAID

## 2018-10-11 VITALS
BODY MASS INDEX: 49.08 KG/M2 | SYSTOLIC BLOOD PRESSURE: 142 MMHG | HEIGHT: 63 IN | HEART RATE: 59 BPM | DIASTOLIC BLOOD PRESSURE: 89 MMHG | WEIGHT: 277 LBS

## 2018-10-11 DIAGNOSIS — N60.41 MAMMARY DUCT ECTASIA OF RIGHT BREAST: Primary | ICD-10-CM

## 2018-10-11 PROCEDURE — G8427 DOCREV CUR MEDS BY ELIG CLIN: HCPCS | Performed by: PHYSICIAN ASSISTANT

## 2018-10-11 PROCEDURE — G8484 FLU IMMUNIZE NO ADMIN: HCPCS | Performed by: PHYSICIAN ASSISTANT

## 2018-10-11 PROCEDURE — G8417 CALC BMI ABV UP PARAM F/U: HCPCS | Performed by: PHYSICIAN ASSISTANT

## 2018-10-11 PROCEDURE — 3017F COLORECTAL CA SCREEN DOC REV: CPT | Performed by: PHYSICIAN ASSISTANT

## 2018-10-11 PROCEDURE — 1036F TOBACCO NON-USER: CPT | Performed by: PHYSICIAN ASSISTANT

## 2018-10-11 PROCEDURE — 99202 OFFICE O/P NEW SF 15 MIN: CPT | Performed by: PHYSICIAN ASSISTANT

## 2018-10-15 ENCOUNTER — HOSPITAL ENCOUNTER (OUTPATIENT)
Dept: WOMENS IMAGING | Age: 58
Discharge: HOME | End: 2018-10-15
Payer: MEDICAID

## 2018-10-15 DIAGNOSIS — N60.41 MAMMARY DUCT ECTASIA OF RIGHT BREAST: ICD-10-CM

## 2018-10-15 PROCEDURE — 77065 DX MAMMO INCL CAD UNI: CPT

## 2018-10-15 PROCEDURE — 88305 TISSUE EXAM BY PATHOLOGIST: CPT

## 2018-10-15 PROCEDURE — 2709999900 US BREAST BIOPSY W LOC DEVICE 1ST LESION RIGHT

## 2018-10-18 ENCOUNTER — TELEPHONE (OUTPATIENT)
Dept: SURGERY | Age: 58
End: 2018-10-18

## 2018-10-29 ENCOUNTER — OFFICE VISIT (OUTPATIENT)
Dept: ONCOLOGY | Facility: CLINIC | Age: 58
End: 2018-10-29

## 2018-10-29 ENCOUNTER — APPOINTMENT (OUTPATIENT)
Dept: LAB | Facility: HOSPITAL | Age: 58
End: 2018-10-29

## 2018-10-29 VITALS
HEIGHT: 63 IN | HEART RATE: 77 BPM | BODY MASS INDEX: 49.58 KG/M2 | DIASTOLIC BLOOD PRESSURE: 80 MMHG | SYSTOLIC BLOOD PRESSURE: 144 MMHG | WEIGHT: 279.8 LBS | TEMPERATURE: 98.3 F | RESPIRATION RATE: 18 BRPM | OXYGEN SATURATION: 97 %

## 2018-10-29 DIAGNOSIS — D64.9 ANEMIA, UNSPECIFIED TYPE: Primary | ICD-10-CM

## 2018-10-29 DIAGNOSIS — C50.919 MALIGNANT NEOPLASM OF FEMALE BREAST, UNSPECIFIED ESTROGEN RECEPTOR STATUS, UNSPECIFIED LATERALITY, UNSPECIFIED SITE OF BREAST (HCC): Primary | ICD-10-CM

## 2018-10-29 DIAGNOSIS — C50.911 SARCOMA OF RIGHT BREAST (HCC): Primary | ICD-10-CM

## 2018-10-29 LAB
ALBUMIN SERPL-MCNC: 4.1 G/DL (ref 3.5–5)
ALBUMIN/GLOB SERPL: 1.2 G/DL (ref 1.1–2.5)
ALP SERPL-CCNC: 76 U/L (ref 24–120)
ALT SERPL W P-5'-P-CCNC: 18 U/L (ref 0–54)
ANION GAP SERPL CALCULATED.3IONS-SCNC: 11 MMOL/L (ref 4–13)
AST SERPL-CCNC: 21 U/L (ref 7–45)
BASOPHILS # BLD AUTO: 0.03 10*3/MM3 (ref 0–0.2)
BASOPHILS NFR BLD AUTO: 0.5 % (ref 0–2)
BILIRUB SERPL-MCNC: 0.3 MG/DL (ref 0.1–1)
BUN BLD-MCNC: 19 MG/DL (ref 5–21)
BUN/CREAT SERPL: 19.6 (ref 7–25)
CALCIUM SPEC-SCNC: 9.1 MG/DL (ref 8.4–10.4)
CHLORIDE SERPL-SCNC: 102 MMOL/L (ref 98–110)
CO2 SERPL-SCNC: 29 MMOL/L (ref 24–31)
CREAT BLD-MCNC: 0.97 MG/DL (ref 0.5–1.4)
DEPRECATED RDW RBC AUTO: 43 FL (ref 40–54)
EOSINOPHIL # BLD AUTO: 0.15 10*3/MM3 (ref 0–0.7)
EOSINOPHIL NFR BLD AUTO: 2.4 % (ref 0–4)
ERYTHROCYTE [DISTWIDTH] IN BLOOD BY AUTOMATED COUNT: 13.9 % (ref 12–15)
GFR SERPL CREATININE-BSD FRML MDRD: 71 ML/MIN/1.73
GLOBULIN UR ELPH-MCNC: 3.5 GM/DL
GLUCOSE BLD-MCNC: 115 MG/DL (ref 70–100)
HCT VFR BLD AUTO: 38.4 % (ref 37–47)
HGB BLD-MCNC: 12.4 G/DL (ref 12–16)
HOLD SPECIMEN: NORMAL
HOLD SPECIMEN: NORMAL
IMM GRANULOCYTES # BLD: 0.03 10*3/MM3 (ref 0–0.03)
IMM GRANULOCYTES NFR BLD: 0.5 % (ref 0–5)
LYMPHOCYTES # BLD AUTO: 1.65 10*3/MM3 (ref 0.72–4.86)
LYMPHOCYTES NFR BLD AUTO: 26.3 % (ref 15–45)
MCH RBC QN AUTO: 27.7 PG (ref 28–32)
MCHC RBC AUTO-ENTMCNC: 32.3 G/DL (ref 33–36)
MCV RBC AUTO: 85.9 FL (ref 82–98)
MONOCYTES # BLD AUTO: 0.54 10*3/MM3 (ref 0.19–1.3)
MONOCYTES NFR BLD AUTO: 8.6 % (ref 4–12)
NEUTROPHILS # BLD AUTO: 3.88 10*3/MM3 (ref 1.87–8.4)
NEUTROPHILS NFR BLD AUTO: 61.7 % (ref 39–78)
NRBC BLD MANUAL-RTO: 0 /100 WBC (ref 0–0)
PLATELET # BLD AUTO: 285 10*3/MM3 (ref 130–400)
PMV BLD AUTO: 9.9 FL (ref 6–12)
POTASSIUM BLD-SCNC: 4.5 MMOL/L (ref 3.5–5.3)
PROT SERPL-MCNC: 7.6 G/DL (ref 6.3–8.7)
RBC # BLD AUTO: 4.47 10*6/MM3 (ref 4.2–5.4)
SODIUM BLD-SCNC: 142 MMOL/L (ref 135–145)
WBC NRBC COR # BLD: 6.28 10*3/MM3 (ref 4.8–10.8)

## 2018-10-29 PROCEDURE — 36415 COLL VENOUS BLD VENIPUNCTURE: CPT | Performed by: INTERNAL MEDICINE

## 2018-10-29 PROCEDURE — 85025 COMPLETE CBC W/AUTO DIFF WBC: CPT | Performed by: INTERNAL MEDICINE

## 2018-10-29 PROCEDURE — 80053 COMPREHEN METABOLIC PANEL: CPT | Performed by: INTERNAL MEDICINE

## 2018-10-29 PROCEDURE — 99214 OFFICE O/P EST MOD 30 MIN: CPT | Performed by: INTERNAL MEDICINE

## 2018-10-29 RX ORDER — ATENOLOL 25 MG/1
25 TABLET ORAL NIGHTLY
COMMUNITY

## 2018-10-29 NOTE — PROGRESS NOTES
[facility]  HEMATOLOGY & ONCOLOGY        Subjective      Patient presents to the office for follow-up.  She reports pain on her left breast, laterally deep close to the chest wall.  The pain is present for the past 2-3 weeks.  She also had an ultrasound and but apparently did not show any abnormality.  Patient does not take any iron supplements.  She did have a mammogram in the middle of last year.  She does have some chronic rash on the lower portion of both breasts and upper abdominal wall , patient states that the rash comes and goes.      VISIT DIAGNOSIS:   Encounter Diagnosis   Name Primary?   • Sarcoma of right breast (CMS/HCC) Yes       REASON FOR VISIT:     Chief Complaint   Patient presents with   • Follow-up        HEMATOLOGY / ONCOLOGY HISTORY:    No history exists.       Ms. 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 Cortez Koenig. The patient  underwent wide excision of the  tail of the breast tumor and axillary node dissection. Resection was negative for further neoplasm and 11 nodes were negative for  metastatic disease. The patient has been followed off all therapy.  Tumor was negative for estrogen receptor and progesterone receptor.      She then later developed a recurrence and she has undergone repeat surgical procedure performed by Dr. Lali Patel on 11/30/2012, finding recurrent sarcomatoid tumor with the posterior margin of tumor involved. since the patient has not had disease recurrence.    Bilateral mammogram on 07/01/2016 does not show any evidence of malignancy.    She did have colonoscopy with Dr. Aviles in September finding polyps  She   Cancer Staging Information:  No matching staging information was found for the patient.      INTERVAL HISTORY  Patient ID: Patti Gilbert is a 58 y.o. year old female     Past Medical History:   Past Medical History:   Diagnosis Date   • Atrial septal defect     [id number]    • Breast cancer (CMS/[facility])    • COPD (chronic obstructive pulmonary disease) (CMS/[facility])    • Diabetes mellitus (CMS/[facility])    • H/O cardiac catheterization    • History of left fractured kneecap    • Hypertension    • Mini Stroke    • Morbid obesity (CMS/[facility])    • Myocardial infarction (CMS/[facility])    • Recretional Drug use    • Right knee injury      Past Surgical History:   Past Surgical History:   Procedure Laterality Date   • BREAST MASS EXCISION      2010   • CARDIAC CATHETERIZATION     • CARDIAC SURGERY      Closure of ASD   • GANGLION CYST EXCISION     • KNEE ARTHROSCOPY Right 01/13/2009   • ROTATOR CUFF REPAIR     • TUBAL ABDOMINAL LIGATION Bilateral      Social History:   Social History     Social History   • Marital status:      Spouse name: N/A   • Number of children: N/A   • Years of education: N/A     Occupational History   • Not on file.     Social History Main Topics   • Smoking status: Never Smoker   • Smokeless tobacco: Never Used   • Alcohol use Yes      Comment: occ.   • Drug use: Yes     Types: Oxycodone   • Sexual activity: Defer     Other Topics Concern   • Not on file     Social History Narrative   • No narrative on file     Family History:   Family History   Problem Relation Age of Onset   • Alzheimer's disease Mother    • Cancer Mother    • Heart disease Father    • Cancer Father    • No Known Problems Sister    • Heart failure Brother    • Diabetes Sister    • Diabetes Brother        Review of Systems   Constitutional: Positive for activity change, appetite change and diaphoresis.   HENT: Positive for congestion.    Eyes: Negative.    Respiratory: Positive for cough, shortness of breath and wheezing.    Cardiovascular: Positive for leg swelling.   Gastrointestinal: Negative.    Endocrine: Negative.    Genitourinary: Negative.    Musculoskeletal: Positive for back pain, joint swelling, neck pain and neck stiffness.   Skin: Negative.    Allergic/Immunologic: Negative.    Neurological:  "Positive for dizziness, light-headedness and headaches.   Hematological: Bruises/bleeds easily.   Psychiatric/Behavioral: Positive for agitation. The patient is nervous/anxious.         Performance Status:  Asymptomatic    Medications:    Current Outpatient Prescriptions   Medication Sig Dispense Refill   • albuterol (PROVENTIL HFA) 108 (90 BASE) MCG/ACT inhaler Inhale.     • amLODIPine (NORVASC) 5 MG tablet TAKE ONE TABLET BY MOUTH EVERY DAY 30 tablet 3   • aspirin (ASPIR) 81 MG EC tablet Take 81 mg by mouth Daily.     • atenolol (TENORMIN) 25 MG tablet Take 25 mg by mouth Daily.     • CloNIDine (CATAPRES) 0.1 MG tablet Take 0.1 mg by mouth As Needed for high blood pressure.     • furosemide (LASIX) 40 MG tablet Take 40 mg by mouth As Needed.     • HYDROcodone-acetaminophen (NORCO) 7.5-325 MG per tablet Take 1 tablet by mouth Every 6 (Six) Hours As Needed for Moderate Pain .     • lisinopril (PRINIVIL,ZESTRIL) 40 MG tablet Take 40 mg by mouth Daily.     • metFORMIN (GLUCOPHAGE) 500 MG tablet Take 500 mg by mouth As Needed.     • potassium chloride (K-DUR,KLOR-CON) 20 MEQ CR tablet Take 20 mEq by mouth Every Other Day.     • carvedilol (COREG) 12.5 MG tablet Take 1 tablet by mouth 2 (Two) Times a Day. 60 tablet 5   • methocarbamol (ROBAXIN) 500 MG tablet As Needed.       No current facility-administered medications for this visit.        ALLERGIES:    Allergies   Allergen Reactions   • Cefdinir    • Levofloxacin        Objective      Vitals:    10/29/18 1335   BP: 144/80   Pulse: 77   Resp: 18   Temp: 98.3 °F (36.8 °C)   TempSrc: Tympanic   SpO2: 97%   Weight: 127 kg (279 lb 12.8 oz)   Height: 160 cm (62.99\")     /80   Pulse 77   Temp 98.3 °F (36.8 °C) (Tympanic)   Resp 18   Ht 160 cm (62.99\")   Wt 127 kg (279 lb 12.8 oz)   LMP  (LMP Unknown)   SpO2 97%   BMI 49.58 kg/m²     Current Status 7/12/2017   ECOG score 0         General Appearance:    Alert, cooperative, no distress, appears stated age " ..obese female.     Head:    Normocephalic, without obvious abnormality, atraumatic   Eyes:    PERRL, conjunctiva pink, sclera clear, EOM's intact   Ears:    Not examined   Nose:   Nares normal, septum midline, mucosa normal, no drainage     or sinus tenderness   Throat:   Lips, mucosa, and tongue normal; teeth and gums normal,     mucous membranes moist   Neck:   Supple, Trachea midline   Back:     Symmetric, no curvature, ROM normal, no CVA tenderness   Lungs:     Clear to auscultation bilaterally, respirations unlabored   Chest Wall:    tenderness on palpation of the chest wall in the upper quadrant of the left breast.  No palpable masses.  There is slight nipple retraction on the right breast.      Heart:    Regular rate and rhythm, S1 and S2 normal, no murmur, rub    or gallop   Abdomen:     Soft, non-tender, bowel sounds active all four quadrants,     no masses, no organomegaly   Extremities:   Extremities normal, atraumatic, no cyanosis or edema       SKIN erythematous and scaly and hyperpigmented scaly rash noted on the lower breasts and upper abdomen         Cervical, supraclavicular, and axillary nodes normal   Neurologic:   Grossly nonfocal, gait coordinated and smooth, cognition is   preserved.       RECENT LABS:  Results for orders placed or performed in visit on 10/29/18   Comprehensive Metabolic Panel   Result Value Ref Range    Glucose 115 (H) 70 - 100 mg/dL    BUN 19 5 - 21 mg/dL    Creatinine 0.97 0.50 - 1.40 mg/dL    Sodium 142 135 - 145 mmol/L    Potassium 4.5 3.5 - 5.3 mmol/L    Chloride 102 98 - 110 mmol/L    CO2 29.0 24.0 - 31.0 mmol/L    Calcium 9.1 8.4 - 10.4 mg/dL    Total Protein 7.6 6.3 - 8.7 g/dL    Albumin 4.10 3.50 - 5.00 g/dL    ALT (SGPT) 18 0 - 54 U/L    AST (SGOT) 21 7 - 45 U/L    Alkaline Phosphatase 76 24 - 120 U/L    Total Bilirubin 0.3 0.1 - 1.0 mg/dL    eGFR  African Amer 71 >60 mL/min/1.73    Globulin 3.5 gm/dL    A/G Ratio 1.2 1.1 - 2.5 g/dL    BUN/Creatinine Ratio 19.6 7.0  - 25.0    Anion Gap 11.0 4.0 - 13.0 mmol/L   CBC Auto Differential   Result Value Ref Range    WBC 6.28 4.80 - 10.80 10*3/mm3    RBC 4.47 4.20 - 5.40 10*6/mm3    Hemoglobin 12.4 12.0 - 16.0 g/dL    Hematocrit 38.4 37.0 - 47.0 %    MCV 85.9 82.0 - 98.0 fL    MCH 27.7 (L) 28.0 - 32.0 pg    MCHC 32.3 (L) 33.0 - 36.0 g/dL    RDW 13.9 12.0 - 15.0 %    RDW-SD 43.0 40.0 - 54.0 fl    MPV 9.9 6.0 - 12.0 fL    Platelets 285 130 - 400 10*3/mm3    Neutrophil % 61.7 39.0 - 78.0 %    Lymphocyte % 26.3 15.0 - 45.0 %    Monocyte % 8.6 4.0 - 12.0 %    Eosinophil % 2.4 0.0 - 4.0 %    Basophil % 0.5 0.0 - 2.0 %    Immature Grans % 0.5 0.0 - 5.0 %    Neutrophils, Absolute 3.88 1.87 - 8.40 10*3/mm3    Lymphocytes, Absolute 1.65 0.72 - 4.86 10*3/mm3    Monocytes, Absolute 0.54 0.19 - 1.30 10*3/mm3    Eosinophils, Absolute 0.15 0.00 - 0.70 10*3/mm3    Basophils, Absolute 0.03 0.00 - 0.20 10*3/mm3    Immature Grans, Absolute 0.03 0.00 - 0.03 10*3/mm3    nRBC 0.0 0.0 - 0.0 /100 WBC             Assessment/Plan     Patient Active Problem List   Diagnosis   • Tinea corporis   • Anemia   • Sarcoma of breast (CMS/HCC)   • Palpitations   • HTN (hypertension)   • Atypical chest pain   • Type 2 diabetes mellitus without complication, without long-term current use of insulin (CMS/HCC)        Patti was seen today for follow-up.    Diagnoses and all orders for this visit:    Sarcoma of right breast (CMS/HCC)       1.  History of Metaplastic sarcomatoid right breast carcinoma diagnosed 2010. Recurrence was found on mammogram and ultrasound and a repeat  resection on 10/16/2012 was performed with a positive margin. Repeat surgery 11/30/2012. Biopsy report on 12/26/2015 shows healing  excisional wound negative for residual tumor.  Since then patient has been disease free.  2.  Left breast pain: Physical exam reveals tenderness on the deep chest wall in the upper part of the left breast.  Patient had an ultrasound recently and it is apparently negative.   We will obtain that report.  On exam, I can feel some tender soft tissue seems to be pectoralis muscle    She has been complaining of pain in the right breast area for the last few weeks I will repeat another ultrasound and a mammogram as well as a chest x-ray and a plain x-ray of the right shoulder to return to clinic in 1 month's time.  Borderline anemia watchful waiting is recommended.    More recnt mammo at [facility] showed a new small superficial lesion in the R breast with skin thickening and therefore, Dr. Etienne at [facility] preformed a vacuum assisted U/s giuded Bx that pathologically showed a pappiloma and Mastitis, praopting use of Abx per Dr. Etienne and that resolved. However, her original sarcomatoid tumur of the R breast involved the tail of the R breast and I soecifically spoke with the radiologist at [facility] who reviewed the U/S of the breast in Sept and Oct 2018 and say he does Not see any abnormailty in the tail area of the R breast. Nevertheless, I will repeat another u/s of the R breast at [facility] in another 4 months I.e., in Feb 2019, just to be sure. On my exam today ROSAURA Maloney MD    10/29/2018    2:23 PM

## 2018-10-30 NOTE — PROGRESS NOTES
reflux disease)     Heart valve disorder     Hypertension     [facility]coma [facility] ([facility] [address]     Type 2 diabetes mellitus without complication ([facility] [address]      Past Surgical History:   Procedure Laterality Date    JOINT REPLACEMENT Bilateral     KNEE SURGERY Bilateral     knee replacement    ROTATOR CUFF REPAIR Right      Family History   Problem Relation Age of Onset    Cancer Mother     Cancer Father         Prostate    Heart Disease Father     Diabetes Maternal Grandmother     Heart Disease Maternal Grandmother      Social History   Substance Use Topics    Smoking status: Never Smoker    Smokeless tobacco: Never Used    Alcohol use Yes      Comment: occ       Review of Systems   All other systems reviewed and are negative. Objective:   Physical Exam   Constitutional: She is oriented to person, place, and time. She appears well-developed and well-nourished. No distress. HENT:   Head: Normocephalic and atraumatic. Right Ear: External ear normal.   Left Ear: External ear normal.   Eyes: Pupils are equal, round, and reactive to light. Conjunctivae and EOM are normal.   Pulmonary/Chest: Right breast exhibits no inverted nipple, no mass, no nipple discharge, no skin change and no tenderness. Left breast exhibits no inverted nipple, no mass, no nipple discharge, no skin change and no tenderness. Breasts are symmetrical.   Neurological: She is alert and oriented to person, place, and time. No cranial nerve deficit. Skin: Skin is warm and dry. No rash noted. She is not diaphoretic. No erythema. No pallor. Psychiatric: She has a normal mood and affect. Her behavior is normal. Judgment and thought content normal.       Assessment:      Right breast mass      Plan:      PLAN:  This will require US guided mammotome biopsy, which will be done in the hospital under local anesthesia. Further procedures will be done as indicated.     CONSENT:  The risks, benefits and options of biopsy/US were discussed with her

## 2018-10-31 DIAGNOSIS — C50.911 SARCOMA OF RIGHT BREAST (HCC): Primary | ICD-10-CM

## 2018-11-01 ENCOUNTER — OFFICE VISIT (OUTPATIENT)
Dept: SURGERY | Age: 58
End: 2018-11-01
Payer: MEDICAID

## 2018-11-01 VITALS — BODY MASS INDEX: 50.68 KG/M2 | WEIGHT: 286 LBS | HEIGHT: 63 IN

## 2018-11-01 DIAGNOSIS — D36.9 INTRADUCTAL PAPILLOMA: Primary | ICD-10-CM

## 2018-11-01 PROCEDURE — G8417 CALC BMI ABV UP PARAM F/U: HCPCS | Performed by: PHYSICIAN ASSISTANT

## 2018-11-01 PROCEDURE — 1036F TOBACCO NON-USER: CPT | Performed by: PHYSICIAN ASSISTANT

## 2018-11-01 PROCEDURE — G8484 FLU IMMUNIZE NO ADMIN: HCPCS | Performed by: PHYSICIAN ASSISTANT

## 2018-11-01 PROCEDURE — G8427 DOCREV CUR MEDS BY ELIG CLIN: HCPCS | Performed by: PHYSICIAN ASSISTANT

## 2018-11-01 PROCEDURE — 3017F COLORECTAL CA SCREEN DOC REV: CPT | Performed by: PHYSICIAN ASSISTANT

## 2018-11-01 PROCEDURE — 99212 OFFICE O/P EST SF 10 MIN: CPT | Performed by: PHYSICIAN ASSISTANT

## 2018-11-19 ENCOUNTER — OFFICE VISIT (OUTPATIENT)
Dept: NEUROSURGERY | Age: 58
End: 2018-11-19
Payer: MEDICAID

## 2018-11-19 VITALS
SYSTOLIC BLOOD PRESSURE: 141 MMHG | BODY MASS INDEX: 49.75 KG/M2 | DIASTOLIC BLOOD PRESSURE: 81 MMHG | HEART RATE: 55 BPM | WEIGHT: 280.8 LBS | HEIGHT: 63 IN

## 2018-11-19 DIAGNOSIS — R51.9 NONINTRACTABLE HEADACHE, UNSPECIFIED CHRONICITY PATTERN, UNSPECIFIED HEADACHE TYPE: Primary | ICD-10-CM

## 2018-11-19 DIAGNOSIS — M54.2 NECK PAIN: ICD-10-CM

## 2018-11-19 DIAGNOSIS — M25.511 CHRONIC RIGHT SHOULDER PAIN: ICD-10-CM

## 2018-11-19 DIAGNOSIS — G89.29 CHRONIC RIGHT SHOULDER PAIN: ICD-10-CM

## 2018-11-19 PROCEDURE — 1036F TOBACCO NON-USER: CPT | Performed by: NURSE PRACTITIONER

## 2018-11-19 PROCEDURE — 99213 OFFICE O/P EST LOW 20 MIN: CPT | Performed by: NURSE PRACTITIONER

## 2018-11-19 PROCEDURE — G8427 DOCREV CUR MEDS BY ELIG CLIN: HCPCS | Performed by: NURSE PRACTITIONER

## 2018-11-19 PROCEDURE — G8484 FLU IMMUNIZE NO ADMIN: HCPCS | Performed by: NURSE PRACTITIONER

## 2018-11-19 PROCEDURE — G8417 CALC BMI ABV UP PARAM F/U: HCPCS | Performed by: NURSE PRACTITIONER

## 2018-11-19 PROCEDURE — 3017F COLORECTAL CA SCREEN DOC REV: CPT | Performed by: NURSE PRACTITIONER

## 2018-11-19 RX ORDER — BUTALBITAL, ACETAMINOPHEN AND CAFFEINE 50; 325; 40 MG/1; MG/1; MG/1
1 CAPSULE ORAL EVERY 8 HOURS PRN
Qty: 30 CAPSULE | Refills: 1 | Status: SHIPPED | OUTPATIENT
Start: 2018-11-19 | End: 2022-03-23

## 2018-11-19 NOTE — PROGRESS NOTES
over external nose or ears  Pulmonary- Good expansion, normal effort without use of accessory mucles  Musculoskeletal - No significant wasting of muscles noted, no bony deformities  Extremities - No clubbing, cyanosis or edema  Skin - Warm, dry, and intact. No rash, erythema, or pallor  Psychiatric - Mood, affect, and behavior appear normal      NEUROLOGICAL EXAM    Mental status   [x]Awake, alert, oriented   [x]Affect attention and concentration appear appropriate  [x]Recent and remote memory appears unremarkable  [x]Speech normal without dysarthria or aphasia, comprehension and repetition intact. COMMENTS:    Cranial Nerves [x]No VF deficit to confrontation,  no papilledema on fundoscopic exam.  [x]PERRLA, EOMI, no nystagmus, conjugate eye movements, no ptosis  [x]Face symmetric  [x]Facial sensation intact  [x]Tongue midline no atrophy or fasciculations present  [x]Palate midline, hearing to finger rub normal bilaterally  [x]Shoulder shrug and SCM testing normal bilaterally  COMMENTS:   Motor   [x]5/5 strength x 4 extremities  [x]Normal bulk and tone  [x]No tremor present  [x]No rigidity or bradykinesia noted  COMMENTS:4/5 proximal RUE due to shoulder pain   Sensory  [x]Sensation intact to light touch, pin prick, vibration, and proprioception BLE  []Sensation intact to light touch, pin prick, vibration, and proprioception BUE  COMMENTS: Decreased PP, vib BUE R>L   Coordination [x]FTN normal bilaterally   [x]HTS normal bilaterally  [x]DILLON normal bilaterally. COMMENTS:   Reflexes  [x]Symmetric and non-pathological  [x]Toes down going bilaterally  [x]No clonus present  COMMENTS:   Gait                  [x]Normal steady gait    []Ataxic    []Spastic     []Magnetic     []Shuffling  COMMENTS:       LABS RECORD AND IMAGING REVIEW (As below and per HPI)    Records reviewed. NCS/EMG with mild CTS. MRI cervical spine with ddd, spinal stenosis, nf narrowing worse at C5-C7.      ASSESSMENT:    Vinicius Dominguez is a 60

## 2019-02-11 DIAGNOSIS — C50.919 MALIGNANT NEOPLASM OF FEMALE BREAST, UNSPECIFIED ESTROGEN RECEPTOR STATUS, UNSPECIFIED LATERALITY, UNSPECIFIED SITE OF BREAST (HCC): Primary | ICD-10-CM

## 2019-03-04 DIAGNOSIS — Z85.3 HISTORY OF BREAST CANCER: Primary | ICD-10-CM

## 2019-03-13 ENCOUNTER — TELEPHONE (OUTPATIENT)
Dept: SURGERY | Age: 59
End: 2019-03-13

## 2019-03-19 ENCOUNTER — OFFICE VISIT (OUTPATIENT)
Dept: NEUROSURGERY | Age: 59
End: 2019-03-19
Payer: MEDICAID

## 2019-03-19 VITALS
DIASTOLIC BLOOD PRESSURE: 80 MMHG | WEIGHT: 289 LBS | HEART RATE: 70 BPM | HEIGHT: 63 IN | OXYGEN SATURATION: 98 % | SYSTOLIC BLOOD PRESSURE: 130 MMHG | BODY MASS INDEX: 51.21 KG/M2

## 2019-03-19 DIAGNOSIS — M25.511 CHRONIC RIGHT SHOULDER PAIN: ICD-10-CM

## 2019-03-19 DIAGNOSIS — G56.01 CARPAL TUNNEL SYNDROME ON RIGHT: ICD-10-CM

## 2019-03-19 DIAGNOSIS — M54.2 NECK PAIN: ICD-10-CM

## 2019-03-19 DIAGNOSIS — G89.29 CHRONIC RIGHT SHOULDER PAIN: ICD-10-CM

## 2019-03-19 DIAGNOSIS — R51.9 NONINTRACTABLE HEADACHE, UNSPECIFIED CHRONICITY PATTERN, UNSPECIFIED HEADACHE TYPE: Primary | ICD-10-CM

## 2019-03-19 PROCEDURE — 99213 OFFICE O/P EST LOW 20 MIN: CPT | Performed by: NURSE PRACTITIONER

## 2019-03-20 ENCOUNTER — TELEPHONE (OUTPATIENT)
Dept: NEUROSURGERY | Age: 59
End: 2019-03-20

## 2019-03-21 ENCOUNTER — TELEPHONE (OUTPATIENT)
Dept: NEUROSURGERY | Age: 59
End: 2019-03-21

## 2019-04-02 ENCOUNTER — TELEPHONE (OUTPATIENT)
Dept: NEUROLOGY | Age: 59
End: 2019-04-02

## 2019-04-03 NOTE — TELEPHONE ENCOUNTER
Patient called today to let us know that her MRI was cancelled due to her insurance denied the test.

## 2019-04-17 ENCOUNTER — TELEPHONE (OUTPATIENT)
Dept: NEUROSURGERY | Age: 59
End: 2019-04-17

## 2019-04-25 DIAGNOSIS — C50.919: Primary | ICD-10-CM

## 2019-04-26 ENCOUNTER — OFFICE VISIT (OUTPATIENT)
Dept: SURGERY | Age: 59
End: 2019-04-26
Payer: MEDICAID

## 2019-04-26 ENCOUNTER — HOSPITAL ENCOUNTER (OUTPATIENT)
Dept: WOMENS IMAGING | Age: 59
Discharge: HOME | End: 2019-04-26
Payer: MEDICAID

## 2019-04-26 VITALS — SYSTOLIC BLOOD PRESSURE: 110 MMHG | HEART RATE: 80 BPM | DIASTOLIC BLOOD PRESSURE: 80 MMHG

## 2019-04-26 DIAGNOSIS — Z12.39 SCREENING BREAST EXAMINATION: Primary | ICD-10-CM

## 2019-04-26 DIAGNOSIS — D36.9 INTRADUCTAL PAPILLOMA: ICD-10-CM

## 2019-04-26 PROCEDURE — G8427 DOCREV CUR MEDS BY ELIG CLIN: HCPCS | Performed by: PHYSICIAN ASSISTANT

## 2019-04-26 PROCEDURE — G0279 TOMOSYNTHESIS, MAMMO: HCPCS

## 2019-04-26 PROCEDURE — 99213 OFFICE O/P EST LOW 20 MIN: CPT | Performed by: PHYSICIAN ASSISTANT

## 2019-04-26 PROCEDURE — 1036F TOBACCO NON-USER: CPT | Performed by: PHYSICIAN ASSISTANT

## 2019-04-26 PROCEDURE — 3017F COLORECTAL CA SCREEN DOC REV: CPT | Performed by: PHYSICIAN ASSISTANT

## 2019-04-26 PROCEDURE — G8417 CALC BMI ABV UP PARAM F/U: HCPCS | Performed by: PHYSICIAN ASSISTANT

## 2019-04-26 RX ORDER — KETOROLAC TROMETHAMINE 10 MG/1
10 TABLET, FILM COATED ORAL EVERY 6 HOURS PRN
COMMUNITY
Start: 2019-01-25 | End: 2020-03-19

## 2019-05-01 ENCOUNTER — OFFICE VISIT (OUTPATIENT)
Dept: NEUROSURGERY | Age: 59
End: 2019-05-01
Payer: MEDICAID

## 2019-05-01 VITALS
SYSTOLIC BLOOD PRESSURE: 128 MMHG | HEART RATE: 66 BPM | HEIGHT: 63 IN | WEIGHT: 293 LBS | DIASTOLIC BLOOD PRESSURE: 87 MMHG | BODY MASS INDEX: 51.91 KG/M2

## 2019-05-01 DIAGNOSIS — R29.898 FINE MOTOR IMPAIRMENT: ICD-10-CM

## 2019-05-01 DIAGNOSIS — R29.898 LEFT ARM WEAKNESS: ICD-10-CM

## 2019-05-01 DIAGNOSIS — R29.818 FINE MOTOR IMPAIRMENT: ICD-10-CM

## 2019-05-01 DIAGNOSIS — M54.2 NECK PAIN: Primary | ICD-10-CM

## 2019-05-01 DIAGNOSIS — R20.8 DECREASED SENSATION: ICD-10-CM

## 2019-05-01 DIAGNOSIS — M79.601 BILATERAL ARM PAIN: ICD-10-CM

## 2019-05-01 DIAGNOSIS — M79.602 BILATERAL ARM PAIN: ICD-10-CM

## 2019-05-01 DIAGNOSIS — M25.532 LEFT WRIST PAIN: ICD-10-CM

## 2019-05-01 DIAGNOSIS — G95.20 CERVICAL SPINAL CORD COMPRESSION (HCC): ICD-10-CM

## 2019-05-01 DIAGNOSIS — M48.02 FORAMINAL STENOSIS OF CERVICAL REGION: ICD-10-CM

## 2019-05-01 PROCEDURE — 1036F TOBACCO NON-USER: CPT | Performed by: NURSE PRACTITIONER

## 2019-05-01 PROCEDURE — G8417 CALC BMI ABV UP PARAM F/U: HCPCS | Performed by: NURSE PRACTITIONER

## 2019-05-01 PROCEDURE — G8427 DOCREV CUR MEDS BY ELIG CLIN: HCPCS | Performed by: NURSE PRACTITIONER

## 2019-05-01 PROCEDURE — 3017F COLORECTAL CA SCREEN DOC REV: CPT | Performed by: NURSE PRACTITIONER

## 2019-05-01 PROCEDURE — 99214 OFFICE O/P EST MOD 30 MIN: CPT | Performed by: NURSE PRACTITIONER

## 2019-05-01 RX ORDER — METHOCARBAMOL 750 MG/1
1 TABLET ORAL WEEKLY
COMMUNITY
Start: 2019-03-21

## 2019-05-01 RX ORDER — ATENOLOL 50 MG/1
1 TABLET ORAL DAILY
COMMUNITY
Start: 2019-03-21

## 2019-05-01 RX ORDER — METHOCARBAMOL 500 MG/1
500 TABLET, FILM COATED ORAL 3 TIMES DAILY
COMMUNITY
End: 2022-03-23 | Stop reason: SDUPTHER

## 2019-05-01 NOTE — PROGRESS NOTES
[facility] Neurosurgery  Office Visit      Chief Complaint   Patient presents with    New Patient     Ref. Shirlee Jeans    Arm Pain     Lt arm and wrist    Shoulder Pain     Rt shoulder    Neck Pain       HISTORY OF PRESENT ILLNESS:    Daxa Nunes is a 60 y.o. female disabled who presents with neck pain and intermittent bilateral arm pain that has been ongoing go over 10+ years. The pain does radiate into the biceps, forearm. Her pain is mostly located in the neck. The patient denies numbness. She does admit to some fine motor impairment with the left hand. She states that she see's Dr. Griselda Aguero for her left wrist pain and states that she has a bone spur inflammation of the tendon. She will sometimes drop objects. The patient has underwent a non-operative treatment course that has included:  NSAIDs (toradol)  Muscle Relaxers (robaxin)  Opiates (Norco)  Oral Steroids (diabetes)  Epidural Steroid Injections (Dr. Estefania Aranda)     Of note she does not use tobacco and does take blood thinning medications (ASA). Of note she does take Metformin.                Past Medical History:   Diagnosis Date    Anxiety     Chronic back pain     GERD (gastroesophageal reflux disease)     Heart valve disorder     Hypertension     Sarcoma of breast (HCC)     Type 2 diabetes mellitus without complication (HCC)        Past Surgical History:   Procedure Laterality Date    JOINT REPLACEMENT Bilateral     KNEE SURGERY Bilateral     knee replacement    ROTATOR CUFF REPAIR Right        Current Outpatient Medications   Medication Sig Dispense Refill    atenolol (TENORMIN) 50 MG tablet Take 1 tablet by mouth daily      D3-50 99189 units CAPS Take 1 capsule by mouth once a week      methocarbamol (ROBAXIN) 500 MG tablet Take 500 mg by mouth 3 times daily      ketorolac (TORADOL) 10 MG tablet Take 10 mg by mouth every 6 hours as needed       butalbital-apap-caffeine -40 MG CAPS Take 1 capsule by mouth every 8 hours as needed for Headaches 30 capsule 1    potassium chloride (KLOR-CON M) 20 MEQ extended release tablet Take 20 mEq by mouth daily      cetirizine (ZYRTEC) 10 MG tablet Take 10 mg by mouth daily      montelukast (SINGULAIR) 10 MG tablet Take 1 tablet by mouth daily      HYDROcodone-acetaminophen (NORCO) 7.5-325 MG per tablet Take 1 tablet by mouth 2 times daily .  albuterol sulfate  (90 Base) MCG/ACT inhaler Inhale into the lungs daily as needed       aspirin 81 MG EC tablet Take 81 mg by mouth      furosemide (LASIX) 40 MG tablet Take 40 mg by mouth as needed       cloNIDine (CATAPRES) 0.1 MG tablet Take 0.1 mg by mouth as needed       lisinopril (PRINIVIL;ZESTRIL) 40 MG tablet 40 mg daily       metFORMIN (GLUCOPHAGE) 500 MG tablet Take 500 mg by mouth as needed       amLODIPine (NORVASC) 5 MG tablet Take 5 mg by mouth daily        No current facility-administered medications for this visit.         Allergies:  Cefdinir and Levofloxacin    Social History:   Social History     Tobacco Use   Smoking Status Never Smoker   Smokeless Tobacco Never Used     Social History     Substance and Sexual Activity   Alcohol Use Yes    Comment: occ         Family History:   Family History   Problem Relation Age of Onset    Cancer Mother     Cancer Father         Prostate    Heart Disease Father     Diabetes Maternal Grandmother     Heart Disease Maternal Grandmother        REVIEW OF SYSTEMS:  Review of Systems   Constitution- Weight loss, Fatigue and Sweating  Skin- Rash and Itching  Hearing/ Nose/ Throat- Ringing in the ears  Eyes- Blurred vision and Sensitivity to light  Cardiovascular- Chest pain, Palpitations, Leg swelling and Severe shortness of breath and coughing  Respiratory- Cough, Shortness of breath and Wheezing  Gastrointestinal- Heartburn, Nausea, Abdominal pain and Diarrhea  Genitourinary- None  Musculoskeletal- Muscle pain, Neck pain, Back pain and Joint pain  Endocrine/ Hematology/ Allergy- CONTRAST   2. Bilateral arm pain M79.601 MRI CERVICAL SPINE WO CONTRAST    M79.602    3. Left arm weakness R29.898 MRI CERVICAL SPINE WO CONTRAST   4. Fine motor impairment R29.818 MRI CERVICAL SPINE WO CONTRAST    R29.898    5. Decreased sensation R20.8 MRI CERVICAL SPINE WO CONTRAST   6. Left wrist pain M25.532 MRI CERVICAL SPINE WO CONTRAST   7. Foraminal stenosis of cervical region M99.81 MRI CERVICAL SPINE WO CONTRAST   8. Cervical spinal cord compression (HCC) G95.20 MRI CERVICAL SPINE WO CONTRAST       PLAN:  -Obtain MRI cervical spine  -Follow up after imaging         Note: A total of >50% (23 minutes) of 45 minutes was spent discussing the pathophysiology and treatment and/or coordination of care of the above diagnoses.       Isreal Morris, APRN

## 2019-05-01 NOTE — PROGRESS NOTES
Review of Systems:  Denies all others  Constitution- Weight loss, Fatigue and Sweating  Skin- Rash and Itching  Hearing/ Nose/ Throat- Ringing in the ears  Eyes- Blurred vision and Sensitivity to light  Cardiovascular- Chest pain, Palpitations, Leg swelling and Severe shortness of breath and coughing  Respiratory- Cough, Shortness of breath and Wheezing  Gastrointestinal- Heartburn, Nausea, Abdominal pain and Diarrhea  Genitourinary- None  Musculoskeletal- Muscle pain, Neck pain, Back pain and Joint pain  Endocrine/ Hematology/ Allergy- Excessive Thirst  Neurological- Dizziness and Headaches  Psychiatric- Depression , Nervousness/ anxiousness and Memory loss

## 2019-05-06 ENCOUNTER — TELEPHONE (OUTPATIENT)
Dept: NEUROSURGERY | Age: 59
End: 2019-05-06

## 2019-05-10 NOTE — PROGRESS NOTES
importance of self breast exam and have explained the technique to her. We also discussed the pathophysiology of fibrocystic disease and breast cancer. She expresses good understanding. We also discussed multiple other issues regarding her and her family's health. PLAN:  I will plan to see her back in 1 year for physical exam and mammograms. She will contact me if anything significant changes. I spent over 50% of visit time counseling patient. 15 minutes of face to face time with patient.

## 2019-05-17 ENCOUNTER — HOSPITAL ENCOUNTER (OUTPATIENT)
Dept: MRI IMAGING | Age: 59
Discharge: HOME | End: 2019-05-17
Payer: MEDICAID

## 2019-05-17 DIAGNOSIS — R29.898 LEFT ARM WEAKNESS: ICD-10-CM

## 2019-05-17 DIAGNOSIS — G95.20 CERVICAL SPINAL CORD COMPRESSION (HCC): ICD-10-CM

## 2019-05-17 DIAGNOSIS — R29.898 FINE MOTOR IMPAIRMENT: ICD-10-CM

## 2019-05-17 DIAGNOSIS — M48.02 FORAMINAL STENOSIS OF CERVICAL REGION: ICD-10-CM

## 2019-05-17 DIAGNOSIS — M54.2 NECK PAIN: ICD-10-CM

## 2019-05-17 DIAGNOSIS — R20.8 DECREASED SENSATION: ICD-10-CM

## 2019-05-17 DIAGNOSIS — M79.602 BILATERAL ARM PAIN: ICD-10-CM

## 2019-05-17 DIAGNOSIS — M25.532 LEFT WRIST PAIN: ICD-10-CM

## 2019-05-17 DIAGNOSIS — R29.818 FINE MOTOR IMPAIRMENT: ICD-10-CM

## 2019-05-17 DIAGNOSIS — M79.601 BILATERAL ARM PAIN: ICD-10-CM

## 2019-05-17 PROCEDURE — 72141 MRI NECK SPINE W/O DYE: CPT

## 2019-05-22 ENCOUNTER — OFFICE VISIT (OUTPATIENT)
Dept: NEUROSURGERY | Age: 59
End: 2019-05-22
Payer: MEDICAID

## 2019-05-22 VITALS
SYSTOLIC BLOOD PRESSURE: 139 MMHG | BODY MASS INDEX: 51.21 KG/M2 | WEIGHT: 289 LBS | HEIGHT: 63 IN | HEART RATE: 64 BPM | DIASTOLIC BLOOD PRESSURE: 78 MMHG

## 2019-05-22 DIAGNOSIS — R20.8 DECREASED SENSATION: ICD-10-CM

## 2019-05-22 DIAGNOSIS — M48.02 FORAMINAL STENOSIS OF CERVICAL REGION: Primary | ICD-10-CM

## 2019-05-22 DIAGNOSIS — M50.30 DDD (DEGENERATIVE DISC DISEASE), CERVICAL: ICD-10-CM

## 2019-05-22 DIAGNOSIS — R29.898 FINE MOTOR IMPAIRMENT: ICD-10-CM

## 2019-05-22 DIAGNOSIS — M79.601 BILATERAL ARM PAIN: ICD-10-CM

## 2019-05-22 DIAGNOSIS — M48.02 CERVICAL STENOSIS OF SPINAL CANAL: ICD-10-CM

## 2019-05-22 DIAGNOSIS — M54.2 NECK PAIN: ICD-10-CM

## 2019-05-22 DIAGNOSIS — M79.602 BILATERAL ARM PAIN: ICD-10-CM

## 2019-05-22 DIAGNOSIS — R29.818 FINE MOTOR IMPAIRMENT: ICD-10-CM

## 2019-05-22 DIAGNOSIS — M25.532 LEFT WRIST PAIN: ICD-10-CM

## 2019-05-22 DIAGNOSIS — R29.898 LEFT ARM WEAKNESS: ICD-10-CM

## 2019-05-22 PROCEDURE — 3017F COLORECTAL CA SCREEN DOC REV: CPT | Performed by: NURSE PRACTITIONER

## 2019-05-22 PROCEDURE — G8417 CALC BMI ABV UP PARAM F/U: HCPCS | Performed by: NURSE PRACTITIONER

## 2019-05-22 PROCEDURE — 99214 OFFICE O/P EST MOD 30 MIN: CPT | Performed by: NURSE PRACTITIONER

## 2019-05-22 PROCEDURE — G8427 DOCREV CUR MEDS BY ELIG CLIN: HCPCS | Performed by: NURSE PRACTITIONER

## 2019-05-22 PROCEDURE — 1036F TOBACCO NON-USER: CPT | Performed by: NURSE PRACTITIONER

## 2019-05-22 NOTE — PROGRESS NOTES
[facility]  Office Visit      Chief Complaint   Patient presents with    Follow-up     MRI c spine review . 5/22/2019: Ms. Guerry Spatz returns to clinic today to review the MRI of the cervical spine. She continues to state that she has neck pain and bilateral arm pain. She states that she has more neck pain than arm pain at this time. HISTORY OF PRESENT ILLNESS:    Leesa Kaufman is a 60 y.o. female disabled who presents with neck pain and intermittent bilateral arm pain that has been ongoing go over 10+ years. The pain does radiate into the biceps, forearm. Her pain is mostly located in the neck. The patient denies numbness. She does admit to some fine motor impairment with the left hand. She states that she see's Dr. Tanja Gaitan for her left wrist pain and states that she has a bone spur and inflammation of the tendon. She will sometimes drop objects. The patient has underwent a non-operative treatment course that has included:  NSAIDs (toradol)  Muscle Relaxers (robaxin)  Opiates (Norco)  Oral Steroids (diabetes)  Epidural Steroid Injections (Dr. Luaro Curtis)     Of note she does not use tobacco and does take blood thinning medications (ASA). Of note she does take Metformin.                Past Medical History:   Diagnosis Date    Anxiety     Chronic back pain     GERD (gastroesophageal reflux disease)     Heart valve disorder     Hypertension     Sarcoma of breast (HCC)     Type 2 diabetes mellitus without complication (HCC)        Past Surgical History:   Procedure Laterality Date    JOINT REPLACEMENT Bilateral     KNEE SURGERY Bilateral     knee replacement    ROTATOR CUFF REPAIR Right        Current Outpatient Medications   Medication Sig Dispense Refill    atenolol (TENORMIN) 50 MG tablet Take 1 tablet by mouth daily      D3-50 25006 units CAPS Take 1 capsule by mouth once a week      methocarbamol (ROBAXIN) 500 MG tablet Take 500 mg by mouth 3 times daily      ketorolac (TORADOL) 10 MG tablet Take 10 mg by mouth every 6 hours as needed       butalbital-apap-caffeine -40 MG CAPS Take 1 capsule by mouth every 8 hours as needed for Headaches 30 capsule 1    potassium chloride (KLOR-CON M) 20 MEQ extended release tablet Take 20 mEq by mouth daily      cetirizine (ZYRTEC) 10 MG tablet Take 10 mg by mouth daily      montelukast (SINGULAIR) 10 MG tablet Take 1 tablet by mouth daily      HYDROcodone-acetaminophen (NORCO) 7.5-325 MG per tablet Take 1 tablet by mouth 2 times daily .  albuterol sulfate  (90 Base) MCG/ACT inhaler Inhale into the lungs daily as needed       aspirin 81 MG EC tablet Take 81 mg by mouth      furosemide (LASIX) 40 MG tablet Take 40 mg by mouth as needed       cloNIDine (CATAPRES) 0.1 MG tablet Take 0.1 mg by mouth as needed       lisinopril (PRINIVIL;ZESTRIL) 40 MG tablet 40 mg daily       metFORMIN (GLUCOPHAGE) 500 MG tablet Take 500 mg by mouth as needed       amLODIPine (NORVASC) 5 MG tablet Take 5 mg by mouth daily        No current facility-administered medications for this visit.         Allergies:  Cefdinir and Levofloxacin    Social History:   Social History     Tobacco Use   Smoking Status Never Smoker   Smokeless Tobacco Never Used     Social History     Substance and Sexual Activity   Alcohol Use Yes    Comment: occ         Family History:   Family History   Problem Relation Age of Onset    Cancer Mother     Cancer Father         Prostate    Heart Disease Father     Diabetes Maternal Grandmother     Heart Disease Maternal Grandmother        REVIEW OF SYSTEMS:  Review of Systems   Constitution- Weight loss, Fatigue and Sweating  Skin- Rash and Itching  Hearing/ Nose/ Throat- Ringing in the ears  Eyes- Blurred vision and Sensitivity to light  Cardiovascular- Chest pain, Palpitations, Leg swelling and Severe shortness of breath and coughing  Respiratory- Cough, Shortness of breath and Wheezing  Gastrointestinal- Heartburn, SPINE WO CONTRAST 5/17/2019 10:25 AM   HISTORY: MRI CERVICAL SPINE WO CONTRAST 5/17/2019 7:24 AM   HISTORY: M54.2   COMPARISON: October 19, 2017    TECHNIQUE: Multiplanar, multisequence MRI of the cervical spine was   obtained without contrast.    FINDINGS:    Imaged portions of the cerebellum and brainstem are unremarkable. Alignment: There is straightening and reversal of the normal cervical   lordosis. .    Marrow signal: No pathologic marrow infiltrate is demonstrated. The   vertebral body heights and posterior elements are maintained. Cord/Canal: The spinal cord is normal in signal and morphology. Soft tissues: The surrounding soft tissues are unremarkable. Levels:    C2-C3: No disc bulge is present. No significant neuroforaminal or   central canal stenosis is seen. C3-C4: No disc bulge is present. No significant neuroforaminal or   central canal stenosis is seen. C4-C5: No disc bulge is present. No significant neuroforaminal or   central canal stenosis is seen. C5-C6: A central and rightward disc hypertrophic osteophyte is present   flattening the central and right side of the cord. However the neural   foramen are patent. Alfonso Newell C6-C7: A central and rightward disc hypertrophic osteophyte complex is   present. This flattens the central and right side of the cord and   compromises the right intervertebral neural foramen. Uncinate spurring   also extends into the left neural foramen. .    C7-T1: At the C7-T1 level bilateral root sleeve cysts are present. .        Impression   1. Degenerative spondylosis as described above   2. Nerve root sleeve cysts are present bilaterally at the C7-T1 level   Signed by Dr Gladys Lew on 5/17/2019 10:33 AM   I have personally reviewed the images and my interpretation is:   There is DDD throughout  There is slight reversal of the normal cervical lordosis  C5-6 DOC more towards the right resulting in mild right foraminal stenosis and mild canal stenosis with the

## 2019-08-27 ENCOUNTER — TELEPHONE (OUTPATIENT)
Dept: NEUROLOGY | Age: 59
End: 2019-08-27

## 2019-08-28 ENCOUNTER — OFFICE VISIT (OUTPATIENT)
Dept: NEUROSURGERY | Age: 59
End: 2019-08-28
Payer: MEDICAID

## 2019-08-28 VITALS
WEIGHT: 285 LBS | SYSTOLIC BLOOD PRESSURE: 139 MMHG | DIASTOLIC BLOOD PRESSURE: 86 MMHG | BODY MASS INDEX: 50.5 KG/M2 | OXYGEN SATURATION: 98 % | HEART RATE: 84 BPM | HEIGHT: 63 IN

## 2019-08-28 DIAGNOSIS — M79.601 BILATERAL ARM PAIN: ICD-10-CM

## 2019-08-28 DIAGNOSIS — M50.30 DDD (DEGENERATIVE DISC DISEASE), CERVICAL: ICD-10-CM

## 2019-08-28 DIAGNOSIS — R29.898 LEFT ARM WEAKNESS: ICD-10-CM

## 2019-08-28 DIAGNOSIS — R29.818 FINE MOTOR IMPAIRMENT: ICD-10-CM

## 2019-08-28 DIAGNOSIS — M54.2 NECK PAIN: ICD-10-CM

## 2019-08-28 DIAGNOSIS — R20.8 DECREASED SENSATION: ICD-10-CM

## 2019-08-28 DIAGNOSIS — M79.602 BILATERAL ARM PAIN: ICD-10-CM

## 2019-08-28 DIAGNOSIS — R29.898 FINE MOTOR IMPAIRMENT: ICD-10-CM

## 2019-08-28 DIAGNOSIS — M48.02 FORAMINAL STENOSIS OF CERVICAL REGION: Primary | ICD-10-CM

## 2019-08-28 DIAGNOSIS — M48.02 CERVICAL STENOSIS OF SPINAL CANAL: ICD-10-CM

## 2019-08-28 PROCEDURE — 99213 OFFICE O/P EST LOW 20 MIN: CPT | Performed by: NURSE PRACTITIONER

## 2019-08-28 PROCEDURE — 3017F COLORECTAL CA SCREEN DOC REV: CPT | Performed by: NURSE PRACTITIONER

## 2019-08-28 PROCEDURE — 1036F TOBACCO NON-USER: CPT | Performed by: NURSE PRACTITIONER

## 2019-08-28 PROCEDURE — G8427 DOCREV CUR MEDS BY ELIG CLIN: HCPCS | Performed by: NURSE PRACTITIONER

## 2019-08-28 PROCEDURE — G8417 CALC BMI ABV UP PARAM F/U: HCPCS | Performed by: NURSE PRACTITIONER

## 2019-08-28 ASSESSMENT — ENCOUNTER SYMPTOMS
DOUBLE VISION: 1
WHEEZING: 1
COUGH: 1
SHORTNESS OF BREATH: 1
DIARRHEA: 1
BACK PAIN: 1
BLURRED VISION: 1

## 2019-08-28 NOTE — PROGRESS NOTES
Follow up    Review of Systems   Constitutional: Negative. HENT: Negative. Eyes: Positive for blurred vision and double vision. Respiratory: Positive for cough, shortness of breath and wheezing. Cardiovascular: Positive for chest pain and palpitations. Gastrointestinal: Positive for diarrhea. Genitourinary: Negative. Musculoskeletal: Positive for back pain, joint pain, myalgias and neck pain. Skin: Negative. Neurological: Positive for dizziness and headaches. Endo/Heme/Allergies: Bruises/bleeds easily. Psychiatric/Behavioral: Positive for memory loss. The patient is nervous/anxious.

## 2019-08-28 NOTE — PROGRESS NOTES
[facility]  Office Visit      Chief Complaint   Patient presents with    Follow-up     Eval after PT     8/28/2019: Ms. Maile Flores returns to clinic today to evaluate her neck and arm pain after participating in PT. She states that she did not start PT. She states that her neck pain is still present. She has recently had an injection into her left wrist and states that it has helped her wrist pain. She reports that she is under a lot of stress at this time getting custody of her grandchildren and being very busy with that.       5/22/2019: Ms. Maile Flores returns to clinic today to review the MRI of the cervical spine. She continues to state that she has neck pain and bilateral arm pain. She states that she has more neck pain than arm pain at this time. HISTORY OF PRESENT ILLNESS:    Emmy Morfin is a 60 y.o. female disabled who presents with neck pain and intermittent bilateral arm pain that has been ongoing go over 10+ years. The pain does radiate into the biceps, forearm. Her pain is mostly located in the neck. The patient denies numbness. She does admit to some fine motor impairment with the left hand. She states that she see's Dr. Sola Esparza for her left wrist pain and states that she has a bone spur and inflammation of the tendon. She will sometimes drop objects. The patient has underwent a non-operative treatment course that has included:  NSAIDs (toradol)  Muscle Relaxers (robaxin)  Opiates (Norco)  Oral Steroids (diabetes)  Epidural Steroid Injections (Dr. Nellie Garcia)     Of note she does not use tobacco and does take blood thinning medications (ASA). Of note she does take Metformin.                Past Medical History:   Diagnosis Date    Anxiety     Chronic back pain     GERD (gastroesophageal reflux disease)     Heart valve disorder     Hypertension     Sarcoma of breast ([facility] [address]     Type 2 diabetes mellitus without complication [facility])        Past Surgical History:   Procedure and IMAGING:    Nursing/pcp notes, imaging, labs, and vitals reviewed. PT,OT and/or speech notes reviewed    No results found for: WBC, HGB, HCT, MCV, PLTNo results found for: NA, K, CL, CO2, BUN, CREATININE, GLUCOSE, CALCIUM, PROT, LABALBU, BILITOT, ALKPHOS, AST, ALT, LABGLOM, GFRAA, AGRATIO, GLOBNo results found for: INR, PROTIME    Old MRI Cervical spine dated (10/19/2017) reviewed. Narrative   EXAMINATION: MRI CERVICAL SPINE WO CONTRAST 5/17/2019 10:25 AM   HISTORY: MRI CERVICAL SPINE WO CONTRAST 5/17/2019 7:24 AM   HISTORY: M54.2   COMPARISON: October 19, 2017    TECHNIQUE: Multiplanar, multisequence MRI of the cervical spine was   obtained without contrast.    FINDINGS:    Imaged portions of the cerebellum and brainstem are unremarkable. Alignment: There is straightening and reversal of the normal cervical   lordosis. .    Marrow signal: No pathologic marrow infiltrate is demonstrated. The   vertebral body heights and posterior elements are maintained. Cord/Canal: The spinal cord is normal in signal and morphology. Soft tissues: The surrounding soft tissues are unremarkable. Levels:    C2-C3: No disc bulge is present. No significant neuroforaminal or   central canal stenosis is seen. C3-C4: No disc bulge is present. No significant neuroforaminal or   central canal stenosis is seen. C4-C5: No disc bulge is present. No significant neuroforaminal or   central canal stenosis is seen. C5-C6: A central and rightward disc hypertrophic osteophyte is present   flattening the central and right side of the cord. However the neural   foramen are patent. Mili Gilbert C6-C7: A central and rightward disc hypertrophic osteophyte complex is   present. This flattens the central and right side of the cord and   compromises the right intervertebral neural foramen. Uncinate spurring   also extends into the left neural foramen. .    C7-T1: At the C7-T1 level bilateral root sleeve cysts are present. .      Impression   1. Degenerative spondylosis as described above   2. Nerve root sleeve cysts are present bilaterally at the C7-T1 level   Signed by Dr Zeenat Vila on 5/17/2019 10:33 AM   I have personally reviewed the images and my interpretation is: There is DDD throughout  There is slight reversal of the normal cervical lordosis  C5-6 DOC more towards the right resulting in mild right foraminal stenosis and mild canal stenosis with the canal measuring 8mm  C6-7 DOC more towards the right resulting in moderate right foraminal stenosis and moderate canal stenosis with the canal measuring 7mm      ASSESSMENT:    Eliel Almaguer is a 60 y.o. female with chronic neck pain and bilateral arm pain. ICD-10-CM    1. Foraminal stenosis of cervical region M99.81    2. Cervical stenosis of spinal canal M48.02    3. DDD (degenerative disc disease), cervical M50.30    4. Neck pain M54.2    5. Bilateral arm pain M79.601     M79.602    6. Left arm weakness R29.898    7. Fine motor impairment R29.818     R29.898    8. Decreased sensation R20.8        PLAN:  -I have again discussed and reviewed the results of the MRI cervical spine with Ms. Shabazz at length. I explained that she does have moderate narrowing on her imaging, however, the majority of her pain is within the neck. That being said, she does not exhibit any myelopathy on her physical exam that would point towards cervical myelopathy. We will definitely hold off on any neurosurgical intervention at this time. I recommend she continue with non-operative treatments for now. She verbalized understanding.  She knows to call with any worsening of symptoms or weakness.       -Follow up as needed for now        JULIAN Newton

## 2019-09-19 ENCOUNTER — OFFICE VISIT (OUTPATIENT)
Dept: NEUROSURGERY | Age: 59
End: 2019-09-19
Payer: MEDICAID

## 2019-09-19 VITALS
DIASTOLIC BLOOD PRESSURE: 67 MMHG | SYSTOLIC BLOOD PRESSURE: 135 MMHG | HEART RATE: 67 BPM | HEIGHT: 63 IN | BODY MASS INDEX: 51.21 KG/M2 | WEIGHT: 289 LBS

## 2019-09-19 DIAGNOSIS — G89.29 CHRONIC RIGHT SHOULDER PAIN: ICD-10-CM

## 2019-09-19 DIAGNOSIS — M25.511 CHRONIC RIGHT SHOULDER PAIN: ICD-10-CM

## 2019-09-19 DIAGNOSIS — M54.2 NECK PAIN: ICD-10-CM

## 2019-09-19 DIAGNOSIS — R51.9 NONINTRACTABLE HEADACHE, UNSPECIFIED CHRONICITY PATTERN, UNSPECIFIED HEADACHE TYPE: Primary | ICD-10-CM

## 2019-09-19 DIAGNOSIS — G56.01 CARPAL TUNNEL SYNDROME ON RIGHT: ICD-10-CM

## 2019-09-19 PROCEDURE — 1036F TOBACCO NON-USER: CPT | Performed by: NURSE PRACTITIONER

## 2019-09-19 PROCEDURE — 3017F COLORECTAL CA SCREEN DOC REV: CPT | Performed by: NURSE PRACTITIONER

## 2019-09-19 PROCEDURE — 99213 OFFICE O/P EST LOW 20 MIN: CPT | Performed by: NURSE PRACTITIONER

## 2019-09-19 PROCEDURE — G8427 DOCREV CUR MEDS BY ELIG CLIN: HCPCS | Performed by: NURSE PRACTITIONER

## 2019-09-19 PROCEDURE — G8417 CALC BMI ABV UP PARAM F/U: HCPCS | Performed by: NURSE PRACTITIONER

## 2019-09-19 ASSESSMENT — ENCOUNTER SYMPTOMS
GASTROINTESTINAL NEGATIVE: 1
RESPIRATORY NEGATIVE: 1
EYES NEGATIVE: 1

## 2019-09-19 NOTE — PROGRESS NOTES
Review of Systems   Constitutional: Negative. HENT: Negative. Eyes: Negative. Respiratory: Negative. Cardiovascular: Negative. Gastrointestinal: Negative. Genitourinary: Negative. Musculoskeletal: Negative. Skin: Negative. Endo/Heme/Allergies: Negative. Psychiatric/Behavioral: Negative.
cetirizine (ZYRTEC) 10 MG tablet Take 10 mg by mouth daily      montelukast (SINGULAIR) 10 MG tablet Take 1 tablet by mouth daily      HYDROcodone-acetaminophen (NORCO) 7.5-325 MG per tablet Take 1 tablet by mouth 2 times daily .  albuterol sulfate  (90 Base) MCG/ACT inhaler Inhale into the lungs daily as needed       aspirin 81 MG EC tablet Take 81 mg by mouth      furosemide (LASIX) 40 MG tablet Take 40 mg by mouth as needed       cloNIDine (CATAPRES) 0.1 MG tablet Take 0.1 mg by mouth as needed       lisinopril (PRINIVIL;ZESTRIL) 40 MG tablet 40 mg daily       metFORMIN (GLUCOPHAGE) 500 MG tablet Take 500 mg by mouth as needed       amLODIPine (NORVASC) 5 MG tablet Take 5 mg by mouth daily        No current facility-administered medications for this visit. Allergies   Allergen Reactions    Cefdinir     Levofloxacin      REVIEW OF SYSTEMS  Constitutional: Negative. HENT: Negative. Eyes: Negative. Respiratory: Negative. Cardiovascular: Negative. Gastrointestinal: Negative. Genitourinary: Negative. Musculoskeletal: Negative. Skin: Negative. Endo/Heme/Allergies: Negative. Psychiatric/Behavioral: Negative. The MA has completed the ROS with the patient. I have reviewed it in its' entirety with the patient and agree with the documentation. PHYSICAL EXAM  /67   Pulse 67   Ht 5' 3\" (1.6 m)   Wt 289 lb (131.1 kg)   BMI 51.19 kg/m²     Constitutional - No acute distress    HEENT- Conjunctiva normal.  No scars, masses, or lesions over external nose or ears  Pulmonary- Good expansion, normal effort without use of accessory mucles  Musculoskeletal - No significant wasting of muscles noted, no bony deformities  Extremities - No clubbing, cyanosis or edema  Skin - Warm, dry, and intact.   No rash, erythema, or pallor  Psychiatric - Mood, affect, and behavior appear normal      NEUROLOGICAL EXAM    Mental status   [x]Awake, alert, oriented   [x]Affect

## 2019-10-23 ENCOUNTER — HOSPITAL ENCOUNTER (OUTPATIENT)
Facility: HOSPITAL | Age: 59
Setting detail: OBSERVATION
Discharge: HOME | End: 2019-10-24
Attending: INTERNAL MEDICINE | Admitting: INTERNAL MEDICINE

## 2019-10-23 ENCOUNTER — NURSE TRIAGE (OUTPATIENT)
Dept: CALL CENTER | Facility: HOSPITAL | Age: 59
End: 2019-10-23

## 2019-10-23 DIAGNOSIS — R00.2 PALPITATIONS: Primary | ICD-10-CM

## 2019-10-24 ENCOUNTER — APPOINTMENT (OUTPATIENT)
Dept: CARDIOLOGY | Facility: HOSPITAL | Age: 59
End: 2019-10-24

## 2019-10-24 ENCOUNTER — HOSPITAL ENCOUNTER (OUTPATIENT)
Dept: CARDIOLOGY | Facility: HOSPITAL | Age: 59
Setting detail: OBSERVATION
Discharge: HOME | End: 2019-10-24

## 2019-10-24 VITALS
BODY MASS INDEX: 50.78 KG/M2 | TEMPERATURE: 97.7 F | HEART RATE: 64 BPM | RESPIRATION RATE: 20 BRPM | HEIGHT: 63 IN | SYSTOLIC BLOOD PRESSURE: 152 MMHG | DIASTOLIC BLOOD PRESSURE: 75 MMHG | OXYGEN SATURATION: 100 % | WEIGHT: 286.6 LBS

## 2019-10-24 PROBLEM — R00.1 BRADYCARDIA: Status: ACTIVE | Noted: 2019-10-24

## 2019-10-24 LAB
ANION GAP SERPL CALCULATED.3IONS-SCNC: 10 MMOL/L (ref 5–15)
BH CV ECHO MEAS - AO MAX PG (FULL): 5.1 MMHG
BH CV ECHO MEAS - AO MAX PG: 9.9 MMHG
BH CV ECHO MEAS - AO MEAN PG (FULL): 3 MMHG
BH CV ECHO MEAS - AO MEAN PG: 5 MMHG
BH CV ECHO MEAS - AO ROOT AREA (BSA CORRECTED): 1.1
BH CV ECHO MEAS - AO ROOT AREA: 5.1 CM^2
BH CV ECHO MEAS - AO ROOT DIAM: 2.6 CM
BH CV ECHO MEAS - AO V2 MAX: 157 CM/SEC
BH CV ECHO MEAS - AO V2 MEAN: 107 CM/SEC
BH CV ECHO MEAS - AO V2 VTI: 38.5 CM
BH CV ECHO MEAS - AVA(I,A): 2.9 CM^2
BH CV ECHO MEAS - AVA(I,D): 2.9 CM^2
BH CV ECHO MEAS - AVA(V,A): 2.6 CM^2
BH CV ECHO MEAS - AVA(V,D): 2.6 CM^2
BH CV ECHO MEAS - BSA(HAYCOCK): 2.5 M^2
BH CV ECHO MEAS - BSA: 2.3 M^2
BH CV ECHO MEAS - BZI_BMI: 50.8 KILOGRAMS/M^2
BH CV ECHO MEAS - BZI_METRIC_HEIGHT: 160 CM
BH CV ECHO MEAS - BZI_METRIC_WEIGHT: 130.2 KG
BH CV ECHO MEAS - EDV(CUBED): 240.6 ML
BH CV ECHO MEAS - EDV(MOD-SP4): 135 ML
BH CV ECHO MEAS - EDV(TEICH): 195.4 ML
BH CV ECHO MEAS - EF(CUBED): 72.2 %
BH CV ECHO MEAS - EF(MOD-SP4): 63.5 %
BH CV ECHO MEAS - EF(TEICH): 62.9 %
BH CV ECHO MEAS - ESV(CUBED): 66.9 ML
BH CV ECHO MEAS - ESV(MOD-SP4): 49.3 ML
BH CV ECHO MEAS - ESV(TEICH): 72.5 ML
BH CV ECHO MEAS - FS: 34.7 %
BH CV ECHO MEAS - IVS/LVPW: 1.3
BH CV ECHO MEAS - IVSD: 1.2 CM
BH CV ECHO MEAS - LA DIMENSION: 4.5 CM
BH CV ECHO MEAS - LA/AO: 1.8
BH CV ECHO MEAS - LAT PEAK E' VEL: 11.5 CM/SEC
BH CV ECHO MEAS - LV DIASTOLIC VOL/BSA (35-75): 59.9 ML/M^2
BH CV ECHO MEAS - LV MASS(C)D: 298.5 GRAMS
BH CV ECHO MEAS - LV MASS(C)DI: 132.4 GRAMS/M^2
BH CV ECHO MEAS - LV MAX PG: 4.8 MMHG
BH CV ECHO MEAS - LV MEAN PG: 2 MMHG
BH CV ECHO MEAS - LV SYSTOLIC VOL/BSA (12-30): 21.9 ML/M^2
BH CV ECHO MEAS - LV V1 MAX: 109 CM/SEC
BH CV ECHO MEAS - LV V1 MEAN: 62.6 CM/SEC
BH CV ECHO MEAS - LV V1 VTI: 28.9 CM
BH CV ECHO MEAS - LVIDD: 6.2 CM
BH CV ECHO MEAS - LVIDS: 4.1 CM
BH CV ECHO MEAS - LVLD AP4: 8.9 CM
BH CV ECHO MEAS - LVLS AP4: 7 CM
BH CV ECHO MEAS - LVOT AREA (M): 3.8 CM^2
BH CV ECHO MEAS - LVOT AREA: 3.8 CM^2
BH CV ECHO MEAS - LVOT DIAM: 2.2 CM
BH CV ECHO MEAS - LVPWD: 0.97 CM
BH CV ECHO MEAS - MED PEAK E' VEL: 6.09 CM/SEC
BH CV ECHO MEAS - MR MAX PG: 50.7 MMHG
BH CV ECHO MEAS - MR MAX VEL: 356 CM/SEC
BH CV ECHO MEAS - MR MEAN PG: 39 MMHG
BH CV ECHO MEAS - MR MEAN VEL: 298 CM/SEC
BH CV ECHO MEAS - MR VTI: 139 CM
BH CV ECHO MEAS - MV A MAX VEL: 101 CM/SEC
BH CV ECHO MEAS - MV DEC TIME: 0.2 SEC
BH CV ECHO MEAS - MV E MAX VEL: 64 CM/SEC
BH CV ECHO MEAS - MV E/A: 0.63
BH CV ECHO MEAS - RAP SYSTOLE: 5 MMHG
BH CV ECHO MEAS - RVDD: 3.8 CM
BH CV ECHO MEAS - RVSP: 22.5 MMHG
BH CV ECHO MEAS - SI(AO): 87.2 ML/M^2
BH CV ECHO MEAS - SI(CUBED): 77 ML/M^2
BH CV ECHO MEAS - SI(LVOT): 48.7 ML/M^2
BH CV ECHO MEAS - SI(MOD-SP4): 38 ML/M^2
BH CV ECHO MEAS - SI(TEICH): 54.5 ML/M^2
BH CV ECHO MEAS - SV(AO): 196.6 ML
BH CV ECHO MEAS - SV(CUBED): 173.7 ML
BH CV ECHO MEAS - SV(LVOT): 109.9 ML
BH CV ECHO MEAS - SV(MOD-SP4): 85.7 ML
BH CV ECHO MEAS - SV(TEICH): 122.9 ML
BH CV ECHO MEAS - TR MAX VEL: 209 CM/SEC
BH CV ECHO MEASUREMENTS AVERAGE E/E' RATIO: 7.28
BH CV STRESS BP STAGE 1: NORMAL
BH CV STRESS BP STAGE 2: NORMAL
BH CV STRESS BP STAGE 3: NORMAL
BH CV STRESS BP STAGE 4: NORMAL
BH CV STRESS DOSE DOBUTAMINE STAGE 1: 10
BH CV STRESS DOSE DOBUTAMINE STAGE 2: 20
BH CV STRESS DOSE DOBUTAMINE STAGE 3: 30
BH CV STRESS DOSE DOBUTAMINE STAGE 4: 40
BH CV STRESS DURATION MIN STAGE 1: 3
BH CV STRESS DURATION MIN STAGE 2: 3
BH CV STRESS DURATION MIN STAGE 3: 3
BH CV STRESS DURATION MIN STAGE 4: 1
BH CV STRESS DURATION SEC STAGE 1: 0
BH CV STRESS DURATION SEC STAGE 2: 0
BH CV STRESS DURATION SEC STAGE 3: 0
BH CV STRESS DURATION SEC STAGE 4: 49
BH CV STRESS HR STAGE 1: 67
BH CV STRESS HR STAGE 2: 87
BH CV STRESS HR STAGE 3: 116
BH CV STRESS HR STAGE 4: 136
BH CV STRESS PROTOCOL 1: NORMAL
BH CV STRESS RECOVERY BP: NORMAL MMHG
BH CV STRESS RECOVERY HR: 77 BPM
BH CV STRESS STAGE 1: 1
BH CV STRESS STAGE 2: 2
BH CV STRESS STAGE 3: 3
BH CV STRESS STAGE 4: 4
BUN BLD-MCNC: 16 MG/DL (ref 8–23)
BUN/CREAT SERPL: 20 (ref 7–25)
CALCIUM SPEC-SCNC: 8.5 MG/DL (ref 8.6–10.5)
CHLORIDE SERPL-SCNC: 102 MMOL/L (ref 98–107)
CO2 SERPL-SCNC: 28 MMOL/L (ref 22–29)
CREAT BLD-MCNC: 0.8 MG/DL (ref 0.57–1)
DEPRECATED RDW RBC AUTO: 42.1 FL (ref 37–54)
ERYTHROCYTE [DISTWIDTH] IN BLOOD BY AUTOMATED COUNT: 13.2 % (ref 12.3–15.4)
GFR SERPL CREATININE-BSD FRML MDRD: 89 ML/MIN/1.73
GLUCOSE BLD-MCNC: 104 MG/DL (ref 65–99)
HCT VFR BLD AUTO: 34.9 % (ref 34–46.6)
HGB BLD-MCNC: 11.1 G/DL (ref 12–15.9)
LEFT ATRIUM VOLUME INDEX: 19.6 ML/M2
LEFT ATRIUM VOLUME: 44.2 CM3
LV EF 2D ECHO EST: 65 %
MAXIMAL PREDICTED HEART RATE: 160 BPM
MCH RBC QN AUTO: 27.8 PG (ref 26.6–33)
MCHC RBC AUTO-ENTMCNC: 31.8 G/DL (ref 31.5–35.7)
MCV RBC AUTO: 87.3 FL (ref 79–97)
PERCENT MAX PREDICTED HR: 85 %
PLATELET # BLD AUTO: 272 10*3/MM3 (ref 140–450)
PMV BLD AUTO: 10.4 FL (ref 6–12)
POTASSIUM BLD-SCNC: 3.6 MMOL/L (ref 3.5–5.2)
RBC # BLD AUTO: 4 10*6/MM3 (ref 3.77–5.28)
SODIUM BLD-SCNC: 140 MMOL/L (ref 136–145)
STRESS BASELINE BP: NORMAL MMHG
STRESS BASELINE HR: 74 BPM
STRESS PERCENT HR: 100 %
STRESS POST EXERCISE DUR MIN: 10 MIN
STRESS POST EXERCISE DUR SEC: 49 SEC
STRESS POST PEAK BP: NORMAL MMHG
STRESS POST PEAK HR: 136 BPM
STRESS TARGET HR: 136 BPM
TROPONIN T SERPL-MCNC: <0.01 NG/ML (ref 0–0.03)
TROPONIN T SERPL-MCNC: <0.01 NG/ML (ref 0–0.03)
WBC NRBC COR # BLD: 6.35 10*3/MM3 (ref 3.4–10.8)

## 2019-10-24 PROCEDURE — G0378 HOSPITAL OBSERVATION PER HR: HCPCS

## 2019-10-24 PROCEDURE — 93010 ELECTROCARDIOGRAM REPORT: CPT | Performed by: INTERNAL MEDICINE

## 2019-10-24 PROCEDURE — 85027 COMPLETE CBC AUTOMATED: CPT | Performed by: INTERNAL MEDICINE

## 2019-10-24 PROCEDURE — 93306 TTE W/DOPPLER COMPLETE: CPT

## 2019-10-24 PROCEDURE — 94799 UNLISTED PULMONARY SVC/PX: CPT

## 2019-10-24 PROCEDURE — 93017 CV STRESS TEST TRACING ONLY: CPT

## 2019-10-24 PROCEDURE — 93350 STRESS TTE ONLY: CPT

## 2019-10-24 PROCEDURE — 25010000002 PERFLUTREN 6.52 MG/ML SUSPENSION: Performed by: INTERNAL MEDICINE

## 2019-10-24 PROCEDURE — 0296T HC EXT ECG > 48HR TO 21 DAY RCRD W/CONECT INTL RCRD: CPT

## 2019-10-24 PROCEDURE — 93352 ADMIN ECG CONTRAST AGENT: CPT | Performed by: INTERNAL MEDICINE

## 2019-10-24 PROCEDURE — 93018 CV STRESS TEST I&R ONLY: CPT | Performed by: INTERNAL MEDICINE

## 2019-10-24 PROCEDURE — 93350 STRESS TTE ONLY: CPT | Performed by: INTERNAL MEDICINE

## 2019-10-24 PROCEDURE — 84484 ASSAY OF TROPONIN QUANT: CPT | Performed by: INTERNAL MEDICINE

## 2019-10-24 PROCEDURE — 80048 BASIC METABOLIC PNL TOTAL CA: CPT | Performed by: INTERNAL MEDICINE

## 2019-10-24 PROCEDURE — 93306 TTE W/DOPPLER COMPLETE: CPT | Performed by: INTERNAL MEDICINE

## 2019-10-24 PROCEDURE — 25010000003 DOBUTAMINE PER 250 MG: Performed by: INTERNAL MEDICINE

## 2019-10-24 PROCEDURE — 93005 ELECTROCARDIOGRAM TRACING: CPT | Performed by: NURSE PRACTITIONER

## 2019-10-24 PROCEDURE — 94760 N-INVAS EAR/PLS OXIMETRY 1: CPT

## 2019-10-24 PROCEDURE — 99235 HOSP IP/OBS SAME DATE MOD 70: CPT | Performed by: INTERNAL MEDICINE

## 2019-10-24 RX ORDER — MONTELUKAST SODIUM 10 MG/1
10 TABLET ORAL DAILY PRN
Status: DISCONTINUED | OUTPATIENT
Start: 2019-10-24 | End: 2019-10-24 | Stop reason: HOSPADM

## 2019-10-24 RX ORDER — POTASSIUM CHLORIDE 750 MG/1
20 CAPSULE, EXTENDED RELEASE ORAL DAILY
Status: DISCONTINUED | OUTPATIENT
Start: 2019-10-24 | End: 2019-10-24 | Stop reason: HOSPADM

## 2019-10-24 RX ORDER — AMLODIPINE BESYLATE 5 MG/1
5 TABLET ORAL
Status: DISCONTINUED | OUTPATIENT
Start: 2019-10-24 | End: 2019-10-24 | Stop reason: HOSPADM

## 2019-10-24 RX ORDER — DOBUTAMINE HYDROCHLORIDE 100 MG/100ML
10-50 INJECTION INTRAVENOUS CONTINUOUS
Status: DISCONTINUED | OUTPATIENT
Start: 2019-10-24 | End: 2019-10-24 | Stop reason: HOSPADM

## 2019-10-24 RX ORDER — METHOCARBAMOL 500 MG/1
500 TABLET, FILM COATED ORAL EVERY 6 HOURS PRN
Status: DISCONTINUED | OUTPATIENT
Start: 2019-10-24 | End: 2019-10-24 | Stop reason: HOSPADM

## 2019-10-24 RX ORDER — ASPIRIN 81 MG/1
81 TABLET ORAL DAILY
Status: DISCONTINUED | OUTPATIENT
Start: 2019-10-24 | End: 2019-10-24 | Stop reason: HOSPADM

## 2019-10-24 RX ORDER — FUROSEMIDE 20 MG/1
40 TABLET ORAL DAILY PRN
Status: DISCONTINUED | OUTPATIENT
Start: 2019-10-24 | End: 2019-10-24 | Stop reason: HOSPADM

## 2019-10-24 RX ORDER — ALBUTEROL SULFATE 2.5 MG/3ML
2.5 SOLUTION RESPIRATORY (INHALATION) EVERY 6 HOURS PRN
Status: DISCONTINUED | OUTPATIENT
Start: 2019-10-24 | End: 2019-10-24 | Stop reason: HOSPADM

## 2019-10-24 RX ORDER — MONTELUKAST SODIUM 10 MG/1
10 TABLET ORAL DAILY PRN
COMMUNITY

## 2019-10-24 RX ORDER — HYDROCODONE BITARTRATE AND ACETAMINOPHEN 7.5; 325 MG/1; MG/1
1 TABLET ORAL EVERY 6 HOURS PRN
Status: DISCONTINUED | OUTPATIENT
Start: 2019-10-24 | End: 2019-10-24 | Stop reason: HOSPADM

## 2019-10-24 RX ORDER — CETIRIZINE HYDROCHLORIDE 10 MG/1
10 TABLET ORAL DAILY PRN
COMMUNITY

## 2019-10-24 RX ORDER — CETIRIZINE HYDROCHLORIDE 10 MG/1
10 TABLET ORAL DAILY PRN
Status: DISCONTINUED | OUTPATIENT
Start: 2019-10-24 | End: 2019-10-24 | Stop reason: HOSPADM

## 2019-10-24 RX ORDER — LISINOPRIL 20 MG/1
40 TABLET ORAL
Status: DISCONTINUED | OUTPATIENT
Start: 2019-10-24 | End: 2019-10-24 | Stop reason: HOSPADM

## 2019-10-24 RX ORDER — CLONIDINE HYDROCHLORIDE 0.1 MG/1
0.1 TABLET ORAL 2 TIMES DAILY PRN
Status: DISCONTINUED | OUTPATIENT
Start: 2019-10-24 | End: 2019-10-24 | Stop reason: HOSPADM

## 2019-10-24 RX ADMIN — PERFLUTREN 8.48 MG: 6.52 INJECTION, SUSPENSION INTRAVENOUS at 13:29

## 2019-10-24 RX ADMIN — ASPIRIN 81 MG: 81 TABLET ORAL at 08:39

## 2019-10-24 RX ADMIN — Medication 10 MCG/KG/MIN: at 13:29

## 2019-10-24 RX ADMIN — LISINOPRIL 40 MG: 20 TABLET ORAL at 10:31

## 2019-10-24 RX ADMIN — POTASSIUM CHLORIDE 20 MEQ: 750 CAPSULE, EXTENDED RELEASE ORAL at 08:39

## 2019-10-24 RX ADMIN — AMLODIPINE BESYLATE 5 MG: 5 TABLET ORAL at 08:39

## 2019-10-24 NOTE — TELEPHONE ENCOUNTER
"Caller states having chest pressure and has had MI in the past. She states slight shortness of breath and pain in breast and neck area. She states monitor found her heart rate in the 30's. She states she is weak but not feeling like she is going to pass out. She states her b/p on last check was 153/99. I had caller take pulse and currently 48 and she states it's coming irregular. Advised to call 911 and in no way should she drive herself. She states she is going to have significant other drive her to ER. She was advised if will not call 911 let significant other know if worse in route call 911.     Reason for Disposition  • Sounds like a life-threatening emergency to the triager    Additional Information  • Negative: Passed out (i.e., lost consciousness, collapsed and was not responding)  • Negative: Shock suspected (e.g., cold/pale/clammy skin, too weak to stand, low BP, rapid pulse)  • Negative: Difficult to awaken or acting confused (e.g., disoriented, slurred speech)  • Negative: Visible sweat on face or sweat dripping down face  • Negative: Unable to walk, or can only walk with assistance (e.g., requires support)  • Negative: [1] Received SHOCK from implantable cardiac defibrillator AND [2] persisting symptoms (i.e., palpitations, lightheadedness)  • Negative: Chest pain  • Negative: Difficulty breathing  • Negative: Dizziness, lightheadedness, or weakness  • Negative: [1] Heart beating very rapidly (e.g., > 140 / minute) AND [2] present now  (Exception: during exercise)    Answer Assessment - Initial Assessment Questions  1. DESCRIPTION: \"Please describe your heart rate or heart beat that you are having\" (e.g., fast/slow, regular/irregular, skipped or extra beats, \"palpitations\")      Irregular, slow and palpitations    2. ONSET: \"When did it start?\" (Minutes, hours or days)         About 40 minutes ago   3. DURATION: \"How long does it last\" (e.g., seconds, minutes, hours)      40 minutes   4. PATTERN \"Does it " "come and go, or has it been constant since it started?\"  \"Does it get worse with exertion?\"   \"Are you feeling it now?\"      Constant   5. TAP: \"Using your hand, can you tap out what you are feeling on a chair or table in front of you, so that I can hear?\" (Note: not all patients can do this)        Irregular   6. HEART RATE: \"Can you tell me your heart rate?\" \"How many beats in 15 seconds?\"  (Note: not all patients can do this)        36 to 66   7. RECURRENT SYMPTOM: \"Have you ever had this before?\" If so, ask: \"When was the last time?\" and \"What happened that time?\"       Something similar in the past but not as bad as this   8. CAUSE: \"What do you think is causing the palpitations?\"      Not sure   9. CARDIAC HISTORY: \"Do you have any history of heart disease?\" (e.g., heart attack, angina, bypass surgery, angioplasty, arrhythmia)       Heart attack and hx of TIA  10. OTHER SYMPTOMS: \"Do you have any other symptoms?\" (e.g., dizziness, chest pain, sweating, difficulty breathing)        Difficulty breathing and chest pressure   11. PREGNANCY: \"Is there any chance you are pregnant?\" \"When was your last menstrual period?\"        N/a    Protocols used: HEART RATE AND HEARTBEAT QUESTIONS-ADULT-AH      "

## 2019-10-24 NOTE — PLAN OF CARE
Problem: Patient Care Overview  Goal: Plan of Care Review  Outcome: Ongoing (interventions implemented as appropriate)   10/24/19 8502   Coping/Psychosocial   Plan of Care Reviewed With patient   Plan of Care Review   Progress no change   OTHER   Outcome Summary Pt is NPO today. She reports her appetite has been fine previously. Provided pt with cardiac/DM diet education with information to take home to help make choices. She is overweight with a BMI of 50.8. Advised pt of available OP counseling and DM classes also available at facility. Encouraged pt to call RD office with diet questions s/p d/c.

## 2019-10-24 NOTE — PLAN OF CARE
Problem: Nutrition, Imbalanced: Excessive Oral Intake (Adult)  Goal: Identify Related Risk Factors and Signs and Symptoms  Outcome: Outcome(s) achieved Date Met: 10/24/19    Goal: Acknowledges the Importance of Weight Loss/Maintenance and Overall Health  Outcome: Ongoing (interventions implemented as appropriate)    Goal: Expresses Desire and Motivation to Change  Outcome: Ongoing (interventions implemented as appropriate)

## 2019-10-24 NOTE — H&P
"[facility] HEART GROUP HISTORY AND PHYSICAL      Patient Care Team:  Jocelin Lyons APRN as PCP - General (Family Medicine)    Chief complaint: chest pressure, palpitations     Subjective     Patti Gilbert is a 59 y.o. female presents with pressure pressure and report of bradycardia. The patient states she was sitting at home yesterday around 6 pm when she developed substernal chest pressure. She states her sbp was 130s and her heart rate was 36 per her home monitor. She had some associated right wrist pain, and later left wrist pain. She states \"bubbles popped up\" under her skin, over her wrist area and she had some numbness there as well. She felt palpitations and as though her \"heart was stopping and starting.\"  Approximately 10 minutes later she states her blood pressure was 150s systolic and her heart rate was in the 60s, but she continued to have chest pressure. The pain persisted for some time and went away without any specific treatment. She did receive ASA at the [facility]. EKG revealed NSR, no acute STT changes. Troponins are negative (1 at [facility], and 2 at [facility]). CXR was negative. Electrolytes were WNL. After transfer to [facility], telemetry reveals NSR in the 60s-70s with bigeminal PVCs at times. The patient states she has had episodes similar to this in the past, but less severe and has not noted a HR that low in the past.    The patient is followed by Dr. Dickson for her history of PFO closure in 2010 for hx of TIAs and migraines. Most recent echo in 2015 revealed a normal LVEF and PFO closure device well seated/in good position. She had a cardiac cath in 2013 which revealed normal coronaries. She had a negative DSE 8/2018.     Review of Systems  Review of Systems   Constitution: Negative for chills, diaphoresis, fever and weakness.   HENT: Negative for nosebleeds.    Eyes: Negative for visual disturbance.   Cardiovascular: Positive for chest pain (with right arm pain and numbness ), irregular heartbeat " and palpitations. Negative for claudication, cyanosis, dyspnea on exertion, leg swelling, near-syncope, orthopnea, paroxysmal nocturnal dyspnea and syncope.   Respiratory: Negative for cough, hemoptysis, shortness of breath, sputum production and wheezing.    Hematologic/Lymphatic: Negative for bleeding problem.   Skin: Negative for color change and flushing.   Musculoskeletal: Negative for falls.   Gastrointestinal: Negative for bloating, abdominal pain, hematemesis, hematochezia, melena, nausea and vomiting.   Genitourinary: Negative for hematuria.   Neurological: Negative for dizziness and light-headedness.   Psychiatric/Behavioral: Negative for altered mental status.        History  Past Medical History:   Diagnosis Date   • Atrial septal defect     [id number]   • Breast cancer (CMS/[facility])    • COPD (chronic obstructive pulmonary disease) (CMS/[facility])    • Diabetes mellitus (CMS/[facility])    • H/O cardiac catheterization    • History of left fractured kneecap    • Hypertension    • Mini Stroke    • Morbid obesity (CMS/[facility])    • Myocardial infarction (CMS/[facility])    • Recretional Drug use    • Right knee injury      Past Surgical History:   Procedure Laterality Date   • BREAST MASS EXCISION      2010   • CARDIAC CATHETERIZATION     • CARDIAC SURGERY      Closure of ASD   • GANGLION CYST EXCISION     • KNEE ARTHROSCOPY Right 01/13/2009   • ROTATOR CUFF REPAIR     • TUBAL ABDOMINAL LIGATION Bilateral      Family History   Problem Relation Age of Onset   • Alzheimer's disease Mother    • Cancer Mother    • Heart disease Father    • Cancer Father    • No Known Problems Sister    • Heart failure Brother    • Diabetes Sister    • Diabetes Brother      Social History     Tobacco Use   • Smoking status: Never Smoker   • Smokeless tobacco: Never Used   Substance Use Topics   • Alcohol use: Yes     Comment: occ.   • Drug use: Yes     Types: Oxycodone       Medications  Prior to Admission medications    Medication Sig Start Date End Date  Taking? Authorizing Provider   amLODIPine (NORVASC) 5 MG tablet TAKE ONE TABLET BY MOUTH EVERY DAY 5/15/18  Yes Kirk Dickson MD   aspirin (ASPIR) 81 MG EC tablet Take 81 mg by mouth Daily.   Yes Sylvia Abebe MD   cetirizine (ZYRTEC ALLERGY) 10 MG tablet Take 10 mg by mouth Daily As Needed for Allergies.   Yes Sylvia Abebe MD   lisinopril (PRINIVIL,ZESTRIL) 40 MG tablet Take 40 mg by mouth Daily.   Yes Sylvia Abebe MD   montelukast (SINGULAIR) 10 MG tablet Take 10 mg by mouth Daily As Needed.   Yes Sylvia Abebe MD   albuterol (PROVENTIL HFA) 108 (90 BASE) MCG/ACT inhaler Inhale 2 puffs Every 6 (Six) Hours As Needed for Wheezing or Shortness of Air.    Sylvia Abebe MD   atenolol (TENORMIN) 25 MG tablet Take 25 mg by mouth Every Night.    Sylvia Abebe MD   carvedilol (COREG) 12.5 MG tablet Take 1 tablet by mouth 2 (Two) Times a Day. 7/18/18   Mary Scott PA-C   CloNIDine (CATAPRES) 0.1 MG tablet Take 0.1 mg by mouth As Needed for high blood pressure.    Sylvia Abebe MD   furosemide (LASIX) 40 MG tablet Take 40 mg by mouth As Needed.    Sylvia Abebe MD   HYDROcodone-acetaminophen (NORCO) 7.5-325 MG per tablet Take 1 tablet by mouth Every 6 (Six) Hours As Needed for Moderate Pain .    Sylvia Abebe MD   metFORMIN (GLUCOPHAGE) 500 MG tablet Take 500 mg by mouth As Needed.    Sylvia Abebe MD   methocarbamol (ROBAXIN) 500 MG tablet As Needed. 5/4/17   Sylvia Abebe MD   potassium chloride (K-DUR,KLOR-CON) 20 MEQ CR tablet Take 20 mEq by mouth Daily.    Sylvia Abebe MD       Current Facility-Administered Medications   Medication Dose Route Frequency Provider Last Rate Last Dose   • albuterol (PROVENTIL) nebulizer solution 0.083% 2.5 mg/3mL  2.5 mg Nebulization Q6H PRN Jin Rosado MD       • amLODIPine (NORVASC) tablet 5 mg  5 mg Oral Q24H Jin Rosado MD   5 mg at 10/24/19 0839   • aspirin EC tablet 81 mg   81 mg Oral Daily Jin Rosado MD   81 mg at 10/24/19 0839   • cetirizine (zyrTEC) tablet 10 mg  10 mg Oral Daily PRN Jin Rosado MD       • cloNIDine (CATAPRES) tablet 0.1 mg  0.1 mg Oral BID PRN Jin Rosado MD       • furosemide (LASIX) tablet 40 mg  40 mg Oral Daily PRN Jin Rosado MD       • HYDROcodone-acetaminophen (NORCO) 7.5-325 MG per tablet 1 tablet  1 tablet Oral Q6H PRN Jin Rosado MD       • methocarbamol (ROBAXIN) tablet 500 mg  500 mg Oral Q6H PRN Jin Rosado MD       • montelukast (SINGULAIR) tablet 10 mg  10 mg Oral Daily PRN Jin Rosado MD       • potassium chloride (MICRO-K) CR capsule 20 mEq  20 mEq Oral Daily Jin Rosado MD   20 mEq at 10/24/19 0839        Allergies:  Cefdinir and Levofloxacin    Objective     Vital Signs  Temp:  [97.8 °F (36.6 °C)-98.1 °F (36.7 °C)] 98.1 °F (36.7 °C)  Heart Rate:  [64-70] 64  Resp:  [18] 18  BP: (133-166)/(66-67) 133/66    Labs  Lab Results (last 72 hours)     Procedure Component Value Units Date/Time    Troponin [[id number]]  (Normal) Collected:  10/24/19 0803    Specimen:  Blood Updated:  10/24/19 0842     Troponin T <0.010 ng/mL     Narrative:       Troponin T Reference Range:  <= 0.03 ng/mL-   Negative for AMI  >0.03 ng/mL-     Abnormal for myocardial necrosis.  Clinicians would have to utilize clinical acumen, EKG, Troponin and serial changes to determine if it is an Acute Myocardial Infarction or myocardial injury due to an underlying chronic condition.     Basic Metabolic Panel [[id number]]  (Abnormal) Collected:  10/24/19 0434    Specimen:  Blood Updated:  10/24/19 0601     Glucose 104 mg/dL      BUN 16 mg/dL      Creatinine 0.80 mg/dL      Sodium 140 mmol/L      Potassium 3.6 mmol/L      Chloride 102 mmol/L      CO2 28.0 mmol/L      Calcium 8.5 mg/dL      eGFR  African Amer 89 mL/min/1.73      BUN/Creatinine Ratio 20.0     Anion Gap 10.0 mmol/L     Narrative:       GFR Normal >60  Chronic Kidney Disease <60  Kidney  Failure <15    Troponin [[id number]]  (Normal) Collected:  10/24/19 0434    Specimen:  Blood Updated:  10/24/19 0601     Troponin T <0.010 ng/mL     Narrative:       Troponin T Reference Range:  <= 0.03 ng/mL-   Negative for AMI  >0.03 ng/mL-     Abnormal for myocardial necrosis.  Clinicians would have to utilize clinical acumen, EKG, Troponin and serial changes to determine if it is an Acute Myocardial Infarction or myocardial injury due to an underlying chronic condition.     CBC (No Diff) [[id number]]  (Abnormal) Collected:  10/24/19 0434    Specimen:  Blood Updated:  10/24/19 0539     WBC 6.35 10*3/mm3      RBC 4.00 10*6/mm3      Hemoglobin 11.1 g/dL      Hematocrit 34.9 %      MCV 87.3 fL      MCH 27.8 pg      MCHC 31.8 g/dL      RDW 13.2 %      RDW-SD 42.1 fl      MPV 10.4 fL      Platelets 272 10*3/mm3           Physical Exam:  Physical Exam   Constitutional: She is oriented to person, place, and time. She appears well-developed and well-nourished. No distress.   HENT:   Head: Normocephalic and atraumatic.   Eyes: Pupils are equal, round, and reactive to light.   Neck: Normal range of motion. Neck supple. No JVD present. No thyromegaly present.   Cardiovascular: Normal rate, regular rhythm and intact distal pulses. Exam reveals no gallop and no friction rub.   Murmur (2/6 systolic ) heard.  Pulmonary/Chest: Effort normal. No respiratory distress. She has decreased breath sounds. She has no wheezes. She has no rales. She exhibits no tenderness.   Abdominal: Soft. Bowel sounds are normal. She exhibits no distension. There is no tenderness.   obese   Musculoskeletal: Normal range of motion. She exhibits no edema.   Neurological: She is alert and oriented to person, place, and time. No cranial nerve deficit.   Skin: Skin is warm and dry. She is not diaphoretic.   Psychiatric: She has a normal mood and affect. Her behavior is normal.       Results Review:    I reviewed the patient's new clinical results.  I  personally viewed and interpreted the patient's EKG/Telemetry data    Assessment/Plan     -Chest pain   -Palpitations   -Reported bradycardia (pt reports HR in mid 30s per home monitor); NSR with HR 60s-70s and bigeminal PVCs at times this admission   -Essential hypertension   -History of PFO closure 2010  -Diabetes mellitus type II  -History of TIAs  -History of breast cancer  -Morbid obesity     Plan-  2d echo - murmur present; do prior to DSE   DSE   14 day zio patch at discharge  Will resume atenolol at discharge. Suspect her bradycardia per home monitor was bigeminy and not true bradycardia.  Continue current medical therapy otherwise   Continue to monitor telemetry  Further recommendations per Dr. Dickson     I discussed the patient's findings and my recommendations with patient and nursing staff.     Sirisha Syed, APRN  10/24/19  9:23 AM

## 2019-10-24 NOTE — DISCHARGE SUMMARY
"[facility] HEART GROUP DISCHARGE    Date of Discharge:  10/24/2019    Discharge Diagnosis:   -Chest pain - non cardiac, DSE low risk for ischemia   -Palpitations   -Reported bradycardia (pt reports HR in mid 30s per home monitor); NSR with HR 60s-70s and bigeminal PVCs at times this admission   -Essential hypertension   -History of PFO closure 2010  -Diabetes mellitus type II  -History of TIAs  -History of breast cancer  -Morbid obesity     Presenting Problem/History of Present Illness  Bradycardia [R00.1]    Per HPI on admission: Patti Gilbert is a 59 y.o. female presents with pressure pressure and report of bradycardia. The patient states she was sitting at home yesterday around 6 pm when she developed substernal chest pressure. She states her sbp was 130s and her heart rate was 36 per her home monitor. She had some associated right wrist pain, and later left wrist pain. She states \"bubbles popped up\" under her skin, over her wrist area and she had some numbness there as well. She felt palpitations and as though her \"heart was stopping and starting.\"  Approximately 10 minutes later she states her blood pressure was 150s systolic and her heart rate was in the 60s, but she continued to have chest pressure. The pain persisted for some time and went away without any specific treatment. She did receive ASA at the [facility]. EKG revealed NSR, no acute STT changes. Troponins are negative (1 at [facility], and 2 at [facility]). CXR was negative. Electrolytes were WNL. After transfer to [facility], telemetry reveals NSR in the 60s-70s with bigeminal PVCs at times. The patient states she has had episodes similar to this in the past, but less severe and has not noted a HR that low in the past.     The patient is followed by Dr. Dickson for her history of PFO closure in 2010 for hx of TIAs and migraines. Most recent echo in 2015 revealed a normal LVEF and PFO closure device well seated/in good position. She had a cardiac cath in 2013 which " revealed normal coronaries. She had a negative DSE 8/2018.       Hospital Course  Patient is a 59 y.o. female presented with chest pain and palpitations as described above. Telemetry revealed NSR with heart rates in the 60s-70s with bigeminal PVCs at times (no bradycardia). DSE was low risk for ischemia. 2d echo revealed normal LVEF at 65% and grade I diastolic dysfunction. She has had no further chest pain or palpitations. She is felt to be stable for discharge on her same medical therapy, including her beta blocker, as she has had no documented bradycardia. She will go home in a 14 day holter and follow up with [facility] NP in 4 weeks.     Lab Results (last 72 hours)     Procedure Component Value Units Date/Time    Troponin [[id number]]  (Normal) Collected:  10/24/19 0803    Specimen:  Blood Updated:  10/24/19 0842     Troponin T <0.010 ng/mL     Narrative:       Troponin T Reference Range:  <= 0.03 ng/mL-   Negative for AMI  >0.03 ng/mL-     Abnormal for myocardial necrosis.  Clinicians would have to utilize clinical acumen, EKG, Troponin and serial changes to determine if it is an Acute Myocardial Infarction or myocardial injury due to an underlying chronic condition.     Basic Metabolic Panel [[id number]]  (Abnormal) Collected:  10/24/19 0434    Specimen:  Blood Updated:  10/24/19 0601     Glucose 104 mg/dL      BUN 16 mg/dL      Creatinine 0.80 mg/dL      Sodium 140 mmol/L      Potassium 3.6 mmol/L      Chloride 102 mmol/L      CO2 28.0 mmol/L      Calcium 8.5 mg/dL      eGFR  African Amer 89 mL/min/1.73      BUN/Creatinine Ratio 20.0     Anion Gap 10.0 mmol/L     Narrative:       GFR Normal >60  Chronic Kidney Disease <60  Kidney Failure <15    Troponin [[id number]]  (Normal) Collected:  10/24/19 0434    Specimen:  Blood Updated:  10/24/19 0601     Troponin T <0.010 ng/mL     Narrative:       Troponin T Reference Range:  <= 0.03 ng/mL-   Negative for AMI  >0.03 ng/mL-     Abnormal for myocardial necrosis.  Clinicians  would have to utilize clinical acumen, EKG, Troponin and serial changes to determine if it is an Acute Myocardial Infarction or myocardial injury due to an underlying chronic condition.     CBC (No Diff) [[id number]]  (Abnormal) Collected:  10/24/19 0434    Specimen:  Blood Updated:  10/24/19 0539     WBC 6.35 10*3/mm3      RBC 4.00 10*6/mm3      Hemoglobin 11.1 g/dL      Hematocrit 34.9 %      MCV 87.3 fL      MCH 27.8 pg      MCHC 31.8 g/dL      RDW 13.2 %      RDW-SD 42.1 fl      MPV 10.4 fL      Platelets 272 10*3/mm3         Echo EF Estimated  Lab Results   Component Value Date    ECHOEFEST 65 10/24/2019       Condition on Discharge:  Stable     Physical Exam at Discharge  General: alert and oriented  Card: RRR , 2/6 SM   Resp: CTA, diminished bases  Extrem: no edema     Vital Signs  Temp:  [97.8 °F (36.6 °C)-98.6 °F (37 °C)] 98.6 °F (37 °C)  Heart Rate:  [64-74] 74  Resp:  [18-20] 20  BP: (119-166)/(66-93) 119/93    Discharge Disposition  Home or Self Care    Discharge Medications     Discharge Medications      Continue These Medications      Instructions Start Date   amLODIPine 5 MG tablet  Commonly known as:  NORVASC   TAKE ONE TABLET BY MOUTH EVERY DAY      ASPIR 81 MG EC tablet  Generic drug:  aspirin   81 mg, Oral, Daily      atenolol 25 MG tablet  Commonly known as:  TENORMIN   25 mg, Oral, Nightly      cloNIDine 0.1 MG tablet  Commonly known as:  CATAPRES   0.1 mg, Oral, As Needed      furosemide 40 MG tablet  Commonly known as:  LASIX   40 mg, Oral, As Needed      HYDROcodone-acetaminophen 7.5-325 MG per tablet  Commonly known as:  NORCO   1 tablet, Oral, Every 6 Hours PRN      lisinopril 40 MG tablet  Commonly known as:  PRINIVIL,ZESTRIL   40 mg, Oral, Daily      metFORMIN 500 MG tablet  Commonly known as:  GLUCOPHAGE   500 mg, Oral, As Needed      methocarbamol 500 MG tablet  Commonly known as:  ROBAXIN   As Needed      montelukast 10 MG tablet  Commonly known as:  SINGULAIR   10 mg, Oral, Daily PRN       potassium chloride 20 MEQ CR tablet  Commonly known as:  K-DUR,KLOR-CON   20 mEq, Oral, Daily      PROVENTIL  (90 Base) MCG/ACT inhaler  Generic drug:  albuterol sulfate HFA   2 puffs, Inhalation, Every 6 Hours PRN      ZYRTEC ALLERGY 10 MG tablet  Generic drug:  cetirizine   10 mg, Oral, Daily PRN         Stop These Medications    carvedilol 12.5 MG tablet  Commonly known as:  COREG            Discharge Diet: cardiac diabetic     Activity at Discharge: gradually resume normal activity     Follow-up Appointments  PCP 1 week  [facility] in 4 weeks (after 2 week zio results back)     Sirisha Syed, APRN  10/24/19  3:26 PM

## 2019-10-24 NOTE — PLAN OF CARE
Problem: Patient Care Overview  Goal: Plan of Care Review  Outcome: Ongoing (interventions implemented as appropriate)   10/24/19 0436   Coping/Psychosocial   Plan of Care Reviewed With patient   Plan of Care Review   Progress no change   OTHER   Outcome Summary Patient is a direct [place] [facility] [place], chest pain, palpitations, and soa per patient at home before admit, no c/o since admit to the floor, up ad armani, home meds resumed, hold atenalol tonight, awaiting MD plan, continue to [facility] for changes.       Problem: Arrhythmia/Dysrhythmia (Symptomatic) (Adult)  Goal: Signs and Symptoms of Listed Potential Problems Will be Absent, Minimized or Managed (Arrhythmia/Dysrhythmia)  Outcome: Ongoing (interventions implemented as appropriate)   10/24/19 0436   Goal/Outcome Evaluation   Problems Assessed (Arrhythmia/Dysrhythmia) all   Problems Present (Dysrhythmia) situational response       Problem: Pain, Chronic (Adult)  Goal: Identify Related Risk Factors and Signs and Symptoms  Outcome: Outcome(s) achieved Date Met: 10/24/19   10/24/19 0436   Pain, Chronic (Adult)   Related Risk Factors (Chronic Pain) disease process   Signs and Symptoms (Chronic Pain) sleep pattern change     Goal: Acceptable Pain/Comfort Level and Functional Ability  Outcome: Ongoing (interventions implemented as appropriate)   10/24/19 0436   Pain, Chronic (Adult)   Acceptable Pain/Comfort Level and Functional Ability achieves outcome

## 2019-11-13 PROCEDURE — 0298T PR EXT ECG > 48HR TO 21 DAY REVIEW AND INTERPRETATN: CPT | Performed by: INTERNAL MEDICINE

## 2019-11-25 ENCOUNTER — NURSE TRIAGE (OUTPATIENT)
Dept: CALL CENTER | Facility: HOSPITAL | Age: 59
End: 2019-11-25

## 2019-11-25 NOTE — TELEPHONE ENCOUNTER
Tala states that she ran out of amlodipine and missed it for 3 days.  She called today for a refill but has not gone to pick it up yet.  She was getting ready to eat supper and said she didn't feel well.  She checked her bp and it was 168/103.  She took her lisinopril and waited an hour before rechecking.  180/109 was her bp on recheck.  She is on her way to  her amlodipine now.  She also takes atenolol at night which she has not had yet.    Reason for Disposition  • Ran out of BP medications    Additional Information  • Negative: Difficult to awaken or acting confused (e.g., disoriented, slurred speech)  • Negative: Severe difficulty breathing (e.g., struggling for each breath, speaks in single words)  • Negative: [1] Weakness of the face, arm or leg on one side of the body AND [2] new onset  • Negative: [1] Numbness (i.e., loss of sensation) of the face, arm or leg on one side of the body AND [2] new onset  • Negative: [1] Chest pain lasts > 5 minutes AND [2] history of heart disease  (i.e., heart attack, bypass surgery, angina, angioplasty, CHF)  • Negative: [1] Chest pain AND [2] took nitrogylcerin AND [3] pain was not relieved  • Negative: Sounds like a life-threatening emergency to the triager  • Negative: Symptom is main concern  (e.g., headache, chest pain)  • Negative: Low blood pressure is main concern  • Negative: [1] Systolic BP  >= 160 OR Diastolic >= 100 AND [2] cardiac or neurologic symptoms (e.g., chest pain, difficulty breathing, unsteady gait, blurred vision)  • Negative: [1] Pregnant > 20 weeks AND [2] new hand or face swelling  • Negative: [1] Pregnant > 20 weeks AND [2] BP Systolic BP  >= 140 OR Diastolic >= 90  • Negative: [1] Systolic BP  >= 200 OR Diastolic >= 120  AND [2] having NO cardiac or neurologic symptoms  • Negative: [1] Systolic BP  >= 180 OR Diastolic >= 110 AND [2] missed most recent dose of blood pressure medication  • Negative: Systolic BP  >= 180 OR Diastolic >=  "110    Answer Assessment - Initial Assessment Questions  1. BLOOD PRESSURE: \"What is the blood pressure?\" \"Did you take at least two measurements 5 minutes apart?\"      168/103 p 73 then took lisinopril and waited an hour it is now 180/109 p 66  2. ONSET: \"When did you take your blood pressure?\"      10 minutes ago  3. HOW: \"How did you obtain the blood pressure?\" (e.g., visiting nurse, automatic home BP monitor)      *No Answer*home monitor  4. HISTORY: \"Do you have a history of high blood pressure?\"      yes  5. MEDICATIONS: \"Are you taking any medications for blood pressure?\" \"Have you missed any doses recently?\"      Yes and was out of amlodipine for 3 days called for refill today should be able to get it in the morning  6. OTHER SYMPTOMS: \"Do you have any symptoms?\" (e.g., headache, chest pain, blurred vision, difficulty breathing, weakness)      headache  7. PREGNANCY: \"Is there any chance you are pregnant?\" \"When was your last menstrual period?\"      no    Protocols used: HIGH BLOOD PRESSURE-ADULT-AH      "

## 2019-11-26 NOTE — TELEPHONE ENCOUNTER
Reason for Disposition  • [1] Systolic BP  >= 180 OR Diastolic >= 110 AND [2] missed most recent dose of blood pressure medication    Additional Information  • Negative: Difficult to awaken or acting confused (e.g., disoriented, slurred speech)  • Negative: Severe difficulty breathing (e.g., struggling for each breath, speaks in single words)  • Negative: [1] Weakness of the face, arm or leg on one side of the body AND [2] new onset  • Negative: [1] Numbness (i.e., loss of sensation) of the face, arm or leg on one side of the body AND [2] new onset  • Negative: [1] Chest pain lasts > 5 minutes AND [2] history of heart disease  (i.e., heart attack, bypass surgery, angina, angioplasty, CHF)  • Negative: [1] Chest pain AND [2] took nitrogylcerin AND [3] pain was not relieved  • Negative: Sounds like a life-threatening emergency to the triager  • Negative: Symptom is main concern  (e.g., headache, chest pain)  • Negative: Low blood pressure is main concern  • Negative: [1] Systolic BP  >= 160 OR Diastolic >= 100 AND [2] cardiac or neurologic symptoms (e.g., chest pain, difficulty breathing, unsteady gait, blurred vision)  • Negative: [1] Pregnant > 20 weeks AND [2] new hand or face swelling  • Negative: [1] Pregnant > 20 weeks AND [2] BP Systolic BP  >= 140 OR Diastolic >= 90  • Negative: [1] Systolic BP  >= 200 OR Diastolic >= 120  AND [2] having NO cardiac or neurologic symptoms  • Negative: Systolic BP  >= 180 OR Diastolic >= 110  • Negative: Ran out of BP medications  • Negative: Systolic BP  >= 160 OR Diastolic >= 100  • Negative: [1] Taking BP medications AND [2] feels is having side effects (e.g., impotence, cough, dizzy upon standing)  • Negative: [1] Systolic BP  >= 140 OR Diastolic >= 90 AND [2] taking BP medications  • Negative: [1] Systolic BP  >= 140 OR Diastolic >= 90 AND [2] not taking BP medications  • Negative: [1] Systolic BP  >= 130 OR Diastolic >= 80  AND [2] history of heart problems, kidney  "disease or diabetes  • Negative: Wants doctor to measure BP  • Negative: [1] Systolic BP  < 140 with Diastolic < 90 AND [2] taking BP medications  • Negative: Systolic BP between 120-139 with Diastolic between 80-89  • Negative: Systolic BP  < 120 with Diastolic < 80  • Negative: Healthy diet, questions about  • Negative: Low salt diet, questions about    Answer Assessment - Initial Assessment Questions  1. BLOOD PRESSURE: \"What is the blood pressure?\" \"Did you take at least two measurements 5 minutes apart?\"      180/109 was 168/103  2. ONSET: \"When did you take your blood pressure?\"      Within the last few minutes and one hour ago  3. HOW: \"How did you obtain the blood pressure?\" (e.g., visiting nurse, automatic home BP monitor)      automatic  4. HISTORY: \"Do you have a history of high blood pressure?\"      yes  5. MEDICATIONS: \"Are you taking any medications for blood pressure?\" \"Have you missed any doses recently?\"      Yes; missed 4 or 5 days  6. OTHER SYMPTOMS: \"Do you have any symptoms?\" (e.g., headache, chest pain, blurred vision, difficulty breathing, weakness)      Slight headache; states she has not eaten since breakfast  7. PREGNANCY: \"Is there any chance you are pregnant?\" \"When was your last menstrual period?\"      denies    Protocols used: HIGH BLOOD PRESSURE-ADULT-AH      "

## 2019-12-05 ENCOUNTER — OFFICE VISIT (OUTPATIENT)
Dept: CARDIOLOGY | Facility: CLINIC | Age: 59
End: 2019-12-05

## 2019-12-05 VITALS
HEIGHT: 63 IN | BODY MASS INDEX: 51.91 KG/M2 | SYSTOLIC BLOOD PRESSURE: 168 MMHG | OXYGEN SATURATION: 100 % | HEART RATE: 66 BPM | DIASTOLIC BLOOD PRESSURE: 98 MMHG | WEIGHT: 293 LBS

## 2019-12-05 DIAGNOSIS — I10 ESSENTIAL HYPERTENSION: Primary | ICD-10-CM

## 2019-12-05 DIAGNOSIS — R00.2 PALPITATIONS: ICD-10-CM

## 2019-12-05 DIAGNOSIS — R07.89 ATYPICAL CHEST PAIN: ICD-10-CM

## 2019-12-05 DIAGNOSIS — R00.1 BRADYCARDIA: ICD-10-CM

## 2019-12-05 DIAGNOSIS — E11.9 TYPE 2 DIABETES MELLITUS WITHOUT COMPLICATION, WITHOUT LONG-TERM CURRENT USE OF INSULIN (HCC): ICD-10-CM

## 2019-12-05 DIAGNOSIS — E66.01 MORBID (SEVERE) OBESITY DUE TO EXCESS CALORIES (HCC): ICD-10-CM

## 2019-12-05 PROCEDURE — 93000 ELECTROCARDIOGRAM COMPLETE: CPT | Performed by: NURSE PRACTITIONER

## 2019-12-05 PROCEDURE — 99214 OFFICE O/P EST MOD 30 MIN: CPT | Performed by: NURSE PRACTITIONER

## 2019-12-05 NOTE — PROGRESS NOTES
Subjective:     Encounter Date:12/05/2019      Patient ID: Patti Gilbert is a 59 y.o. female with a history of HTN, DM, obesity, palpitations.  She recently was seen in the hospital for complaints of palpitations, bradycardia, and chest pain. She had normal cardiac testing in the hospital. She presents today for discharge follow up.     Chief Complaint: Discharge follow up  Hypertension   This is a chronic problem. The current episode started more than 1 month ago. The problem has been waxing and waning since onset. The problem is controlled. Associated symptoms include anxiety and chest pain (occassional complaints with high blood pressure ). Pertinent negatives include no headaches, orthopnea, palpitations or shortness of breath. Risk factors for coronary artery disease include diabetes mellitus, obesity and stress. Current antihypertension treatment includes ACE inhibitors, beta blockers, calcium channel blockers, diuretics and central alpha agonists. The current treatment provides significant improvement. There is no history of angina, CAD/MI or heart failure.     Ms. Gilbert presents to the office today for discharge follow-up.  She was seen in the hospital due to complaints of chest pain, palpitations, and bradycardia and was evaluated with stress testing, echocardiogram, and outpatient Holter monitor.  All cardiac testing has essentially been normal.  Chest pain is attributed to her uncontrolled blood pressure.  Bradycardia has not been found to be related to any high-grade AV block.  She is on beta-blocker therapy and takes as needed clonidine.  She notes recent stress.  Oftentimes her chest pain is associated with periods of high blood pressure.      The following portions of the patient's history were reviewed and updated as appropriate: allergies, current medications, past family history, past medical history, past social history and past surgical history.     Allergies   Allergen Reactions   •  Cefdinir    • Levofloxacin Unknown (See Comments)     Patient stated feeling like she was dying.       Current Outpatient Medications:   •  albuterol (PROVENTIL HFA) 108 (90 BASE) MCG/ACT inhaler, Inhale 2 puffs Every 6 (Six) Hours As Needed for Wheezing or Shortness of Air., Disp: , Rfl:   •  amLODIPine (NORVASC) 5 MG tablet, TAKE ONE TABLET BY MOUTH EVERY DAY, Disp: 30 tablet, Rfl: 3  •  aspirin (ASPIR) 81 MG EC tablet, Take 81 mg by mouth Daily., Disp: , Rfl:   •  atenolol (TENORMIN) 25 MG tablet, Take 25 mg by mouth Every Night., Disp: , Rfl:   •  cetirizine (ZYRTEC ALLERGY) 10 MG tablet, Take 10 mg by mouth Daily As Needed for Allergies., Disp: , Rfl:   •  CloNIDine (CATAPRES) 0.1 MG tablet, Take 0.1 mg by mouth As Needed for high blood pressure., Disp: , Rfl:   •  furosemide (LASIX) 40 MG tablet, Take 40 mg by mouth As Needed., Disp: , Rfl:   •  HYDROcodone-acetaminophen (NORCO) 7.5-325 MG per tablet, Take 1 tablet by mouth Every 6 (Six) Hours As Needed for Moderate Pain ., Disp: , Rfl:   •  lisinopril (PRINIVIL,ZESTRIL) 40 MG tablet, Take 40 mg by mouth Daily., Disp: , Rfl:   •  metFORMIN (GLUCOPHAGE) 500 MG tablet, Take 500 mg by mouth As Needed., Disp: , Rfl:   •  montelukast (SINGULAIR) 10 MG tablet, Take 10 mg by mouth Daily As Needed., Disp: , Rfl:   •  potassium chloride (K-DUR,KLOR-CON) 20 MEQ CR tablet, Take 20 mEq by mouth Daily., Disp: , Rfl:     Past Medical History:   Diagnosis Date   • Atrial septal defect     [id number]   • Breast cancer (CMS/HCC)    • COPD (chronic obstructive pulmonary disease) (CMS/HCC)    • Diabetes mellitus (CMS/HCC)    • H/O cardiac catheterization    • History of left fractured kneecap    • Hypertension    • Mini Stroke    • Morbid obesity (CMS/HCC)    • Myocardial infarction (CMS/HCC)    • Recretional Drug use    • Right knee injury      Family History   Problem Relation Age of Onset   • Alzheimer's disease Mother    • Cancer Mother    • Heart disease Father    • Cancer  "Father    • No Known Problems Sister    • Heart failure Brother    • Diabetes Sister    • Diabetes Brother      Social History     Socioeconomic History   • Marital status:      Spouse name: Not on file   • Number of children: Not on file   • Years of education: Not on file   • Highest education level: Not on file   Tobacco Use   • Smoking status: Never Smoker   • Smokeless tobacco: Never Used   Substance and Sexual Activity   • Alcohol use: Yes     Comment: occ.   • Drug use: Yes     Types: Oxycodone   • Sexual activity: Defer     Past Surgical History:   Procedure Laterality Date   • BREAST MASS EXCISION      2010   • CARDIAC CATHETERIZATION     • CARDIAC SURGERY      Closure of ASD   • GANGLION CYST EXCISION     • KNEE ARTHROSCOPY Right 01/13/2009   • ROTATOR CUFF REPAIR     • TUBAL ABDOMINAL LIGATION Bilateral      Review of Systems   Constitution: Negative for diaphoresis, fever and weakness.   Eyes: Negative for visual disturbance.   Cardiovascular: Positive for chest pain (occassional complaints with high blood pressure ). Negative for dyspnea on exertion, leg swelling, near-syncope, orthopnea, palpitations and syncope.   Respiratory: Negative for shortness of breath and wheezing.    Hematologic/Lymphatic: Negative for bleeding problem.   Gastrointestinal: Negative for bloating, abdominal pain, nausea and vomiting.   Neurological: Negative for dizziness, headaches and light-headedness.   Psychiatric/Behavioral: Negative for altered mental status.         ECG 12 Lead  Date/Time: 12/5/2019 11:59 AM  Performed by: Evelyn Cid APRN  Authorized by: Evelyn Cid APRN   Comparison: compared with previous ECG from 10/24/2019  Similar to previous ECG  Rhythm: sinus rhythm  Rate: normal  BPM: 66  ST Segments: ST segments normal  T Waves: T waves normal  QRS axis: normal  Other findings: poor R wave progression    Clinical impression: abnormal EKG          /98   Pulse 66   Ht 160 cm (62.99\")   " Wt 134 kg (295 lb)   LMP  (LMP Unknown)   SpO2 100%   BMI 52.27 kg/m²        Objective:     Physical Exam   Constitutional: She appears well-developed and well-nourished. No distress.   HENT:   Head: Normocephalic and atraumatic.   Mouth/Throat: Oropharynx is clear and moist. No oropharyngeal exudate.   Eyes: Conjunctivae are normal. No scleral icterus.   Neck: Normal range of motion. Neck supple.   Cardiovascular: Normal rate, regular rhythm and normal heart sounds.   Pulmonary/Chest: Effort normal and breath sounds normal. No respiratory distress. She has no wheezes. She has no rales.   Abdominal: Soft. Normal appearance. She exhibits no distension. There is no tenderness.   Musculoskeletal: Normal range of motion. She exhibits no edema.   Skin: Skin is warm, dry and intact. No rash noted. She is not diaphoretic. No erythema. No pallor.   Psychiatric: She has a normal mood and affect. Her behavior is normal.   Vitals reviewed.    Lab Review: reviewed recent testing       Assessment:          Diagnosis Plan   1. Essential hypertension     2. Palpitations     3. Bradycardia     4. Type 2 diabetes mellitus without complication, without long-term current use of insulin (CMS/[facility])     5. Atypical chest pain     6. Morbid (severe) obesity due to excess calories (CMS/[facility])            Plan:        Essential hypertension: Uncontrolled.  On high-dose lisinopril, atenolol, amlodipine, and as needed use of clonidine and furosemide.  I suggest low-sodium diet, monitoring of blood pressure at home, and appropriate use of as needed medications.  Could consider adjustment of beta-blocker from atenolol to carvedilol for more support for blood pressure.  She can follow-up with her primary care physician for blood pressure management.  Discussed chest pain correlations with high blood pressure.    -Palpitations: Improved, rare.  Continue beta-blocker therapy.  Holter monitoring reviewed with patient and no correlation of  palpitations with rhythm abnormalities noted on Holter.    -Bradycardia: Previously reported bradycardic episodes with heart rates in the 30s at home.  Well-controlled heart rate on beta-blocker therapy during her hospitalization and lowest heart rate documented on Holter monitoring after discharge was 53.  Continue low-dose atenolol.  Discussed with patient that bradycardia can be normal due to her medications.  She is asymptomatic with bradycardia.    -Diabetes: Managed per primary care on oral agents.     -Chest pain: Normal stress testing during hospitalization.  Worse with episodes of high blood pressure.  Improved since her discharge.  Episodes since discharge of chest pain have been related to stressful events or high blood pressure.  No further cardiac work-up indicated.    -Morbid obesity: Advised diet changes, exercise, weight loss.  Advised low-sodium diet due to high blood pressure and chest pain complaints.    Follow-up in our office as needed.  Currently she needs adjustments of her medications for improvement of her blood pressure.  This can be done with management through her primary care office.  I have suggested that she use her as needed dose of clonidine as needed and monitor her blood pressures at home.  Further consideration of med changes could include adjusting her beta-blocker from atenolol to carvedilol, as carvedilol is a better antihypertensive than atenolol.  Again, this can be done by her primary care physician.  Cardiac testing has been done and negative.  This includes stress testing, echocardiogram, Holter monitoring.  She should call us for appointment if needed.

## 2019-12-05 NOTE — PATIENT INSTRUCTIONS
Hypertension  Hypertension is another name for high blood pressure. High blood pressure forces your heart to work harder to pump blood. This can cause problems over time.  There are two numbers in a blood pressure reading. There is a top number (systolic) over a bottom number (diastolic). It is best to have a blood pressure below 120/80. Healthy choices can help lower your blood pressure. You may need medicine to help lower your blood pressure if:  · Your blood pressure cannot be lowered with healthy choices.  · Your blood pressure is higher than 130/80.  Follow these instructions at home:  Eating and drinking    · If directed, follow the DASH eating plan. This diet includes:  ? Filling half of your plate at each meal with fruits and vegetables.  ? Filling one quarter of your plate at each meal with whole grains. Whole grains include whole wheat pasta, brown rice, and whole grain bread.  ? Eating or drinking low-fat dairy products, such as skim milk or low-fat yogurt.  ? Filling one quarter of your plate at each meal with low-fat (lean) proteins. Low-fat proteins include fish, skinless chicken, eggs, beans, and tofu.  ? Avoiding fatty meat, cured and processed meat, or chicken with skin.  ? Avoiding premade or processed food.  · Eat less than 1,500 mg of salt (sodium) a day.  · Limit alcohol use to no more than 1 drink a day for nonpregnant women and 2 drinks a day for men. One drink equals 12 oz of beer, 5 oz of wine, or 1½ oz of hard liquor.  Lifestyle  · Work with your doctor to stay at a healthy weight or to lose weight. Ask your doctor what the best weight is for you.  · Get at least 30 minutes of exercise that causes your heart to beat faster (aerobic exercise) most days of the week. This may include walking, swimming, or biking.  · Get at least 30 minutes of exercise that strengthens your muscles (resistance exercise) at least 3 days a week. This may include lifting weights or pilates.  · Do not use any  products that contain nicotine or tobacco. This includes cigarettes and e-cigarettes. If you need help quitting, ask your doctor.  · Check your blood pressure at home as told by your doctor.  · Keep all follow-up visits as told by your doctor. This is important.  Medicines  · Take over-the-counter and prescription medicines only as told by your doctor. Follow directions carefully.  · Do not skip doses of blood pressure medicine. The medicine does not work as well if you skip doses. Skipping doses also puts you at risk for problems.  · Ask your doctor about side effects or reactions to medicines that you should watch for.  Contact a doctor if:  · You think you are having a reaction to the medicine you are taking.  · You have headaches that keep coming back (recurring).  · You feel dizzy.  · You have swelling in your ankles.  · You have trouble with your vision.  Get help right away if:  · You get a very bad headache.  · You start to feel confused.  · You feel weak or numb.  · You feel faint.  · You get very bad pain in your:  ? Chest.  ? Belly (abdomen).  · You throw up (vomit) more than once.  · You have trouble breathing.  Summary  · Hypertension is another name for high blood pressure.  · Making healthy choices can help lower blood pressure. If your blood pressure cannot be controlled with healthy choices, you may need to take medicine.  This information is not intended to replace advice given to you by your health care provider. Make sure you discuss any questions you have with your health care provider.  Document Released: 06/05/2009 Document Revised: 11/15/2017 Document Reviewed: 11/15/2017  [employer] Interactive Patient Education © 2019 [employer]

## 2019-12-09 ENCOUNTER — NURSE TRIAGE (OUTPATIENT)
Dept: CALL CENTER | Facility: HOSPITAL | Age: 59
End: 2019-12-09

## 2019-12-09 NOTE — TELEPHONE ENCOUNTER
"She is calling because she is having chest pressure. She is stating she is having chest pressure. She does not want to go to the ED. She wants to stay home and take a ASA. She states she has too much to do. Advised to go to the ED.      Reason for Disposition  • [1] Chest pain lasts > 5 minutes AND [2] history of heart disease  (i.e., heart attack, bypass surgery, angina, angioplasty, CHF; not just a heart murmur)    Additional Information  • Negative: Severe difficulty breathing (e.g., struggling for each breath, speaks in single words)  • Negative: Difficult to awaken or acting confused (e.g., disoriented, slurred speech)  • Negative: Shock suspected (e.g., cold/pale/clammy skin, too weak to stand, low BP, rapid pulse)  • Negative: [1] Chest pain lasts > 5 minutes AND [2] described as crushing, pressure-like, or heavy    Answer Assessment - Initial Assessment Questions  1. LOCATION: \"Where does it hurt?\"        She is having chest pressure in the middle of the chest.   2. RADIATION: \"Does the pain go anywhere else?\" (e.g., into neck, jaw, arms, back)      It does not go anywhere.   3. ONSET: \"When did the chest pain begin?\" (Minutes, hours or days)       When she awoke this am.   4. PATTERN \"Does the pain come and go, or has it been constant since it started?\"  \"Does it get worse with exertion?\"       It is constant.   5. DURATION: \"How long does it last\" (e.g., seconds, minutes, hours)      It is has not went away.   6. SEVERITY: \"How bad is the pain?\"  (e.g., Scale 1-10; mild, moderate, or severe)     - MILD (1-3): doesn't interfere with normal activities      - MODERATE (4-7): interferes with normal activities or awakens from sleep     - SEVERE (8-10): excruciating pain, unable to do any normal activities        4   7. CARDIAC RISK FACTORS: \"Do you have any history of heart problems or risk factors for heart disease?\" (e.g., prior heart attack, angina; high blood pressure, diabetes, being overweight, high " "cholesterol, smoking, or strong family history of heart disease)      Yes she has a heart hx, has a hx. Of palpations.   8. PULMONARY RISK FACTORS: \"Do you have any history of lung disease?\"  (e.g., blood clots in lung, asthma, emphysema, birth control pills)      n  9. CAUSE: \"What do you think is causing the chest pain?\"      She does not know.   10. OTHER SYMPTOMS: \"Do you have any other symptoms?\" (e.g., dizziness, nausea, vomiting, sweating, fever, difficulty breathing, cough)        n  11. PREGNANCY: \"Is there any chance you are pregnant?\" \"When was your last menstrual period?\"        n/a    Protocols used: CHEST PAIN-ADULT-AH      "

## 2019-12-24 ENCOUNTER — NURSE TRIAGE (OUTPATIENT)
Dept: CALL CENTER | Facility: HOSPITAL | Age: 59
End: 2019-12-24

## 2019-12-25 NOTE — TELEPHONE ENCOUNTER
"    Reason for Disposition  • Caller has medication question only, adult not sick, and triager answers question    Additional Information  • Negative: Drug overdose and nurse unable to answer question  • Negative: Caller requesting information not related to medicine  • Negative: Caller requesting a prescription for Strep throat and has a positive culture result  • Negative: Rash while taking a medication or within 3 days of stopping it  • Negative: Immunization reaction suspected  • Negative: [1] Asthma and [2] having symptoms of asthma (cough, wheezing, etc)  • Negative: MORE THAN A DOUBLE DOSE of a prescription or over-the-counter (OTC) drug  • Negative: [1] DOUBLE DOSE (an extra dose or lesser amount) of over-the-counter (OTC) drug AND [2] any symptoms (e.g., dizziness, nausea, pain, sleepiness)  • Negative: [1] DOUBLE DOSE (an extra dose or lesser amount) of prescription drug AND [2] any symptoms (e.g., dizziness, nausea, pain, sleepiness)  • Negative: Took another person's prescription drug  • Negative: [1] DOUBLE DOSE (an extra dose or lesser amount) of prescription drug AND [2] NO symptoms (Exception: a double dose of antibiotics)  • Negative: Diabetes drug error or overdose (e.g., insulin or extra dose)  • Negative: [1] Request for URGENT new prescription or refill of \"essential\" medication (i.e., likelihood of harm to patient if not taken) AND [2] triager unable to fill per unit policy  • Negative: [1] Prescription not at pharmacy AND [2] was prescribed today by PCP  • Negative: Pharmacy calling with prescription questions and triager unable to answer question  • Negative: Caller has URGENT medication question about med that PCP prescribed and triager unable to answer question  • Negative: Caller has NON-URGENT medication question about med that PCP prescribed and triager unable to answer question  • Negative: Caller requesting a NON-URGENT new prescription or refill and triager unable to refill per unit " "policy  • Negative: Caller has medication question about med not prescribed by PCP and triager unable to answer question (e.g., compatibility with other med, storage)  • Negative: [1] DOUBLE DOSE (an extra dose or lesser amount) of over-the-counter (OTC) drug AND [2] NO symptoms  • Negative: [1] DOUBLE DOSE (an extra dose or lesser amount) of antibiotic drug AND [2] NO symptoms    Answer Assessment - Initial Assessment Questions  1. SYMPTOMS: \"Do you have any symptoms?\"      Caller states she has one blood pressure pill left and she did not know her pharmacy closed at 2 PM today.  Asking if the ER would possible help her get a few pills until the pharmacy opens on Thursday.  Advised that would be okay to check with them.   2. SEVERITY: If symptoms are present, ask \"Are they mild, moderate or severe?\"      No symptoms at present.    Protocols used: MEDICATION QUESTION CALL-ADULT-      "

## 2020-02-26 ENCOUNTER — HOSPITAL ENCOUNTER (OUTPATIENT)
Dept: SLEEP CENTER | Age: 60
Discharge: HOME | End: 2020-02-28
Payer: MEDICAID

## 2020-02-26 PROCEDURE — 95806 SLEEP STUDY UNATT&RESP EFFT: CPT | Performed by: PSYCHIATRY & NEUROLOGY

## 2020-02-26 PROCEDURE — G0399 HOME SLEEP TEST/TYPE 3 PORTA: HCPCS

## 2020-02-26 NOTE — PROGRESS NOTES
Pt in the office to  HST monitor. Pt was educated on how to use equipment properly and instructed to wear for 2 nights and to return on Friday. Pt states she understood.

## 2020-02-27 ENCOUNTER — TELEPHONE (OUTPATIENT)
Dept: SURGERY | Age: 60
End: 2020-02-27

## 2020-02-27 PROCEDURE — G0399 HOME SLEEP TEST/TYPE 3 PORTA: HCPCS

## 2020-02-28 PROCEDURE — 95806 SLEEP STUDY UNATT&RESP EFFT: CPT | Performed by: PSYCHIATRY & NEUROLOGY

## 2020-03-19 ENCOUNTER — OFFICE VISIT (OUTPATIENT)
Dept: NEUROSURGERY | Age: 60
End: 2020-03-19
Payer: MEDICAID

## 2020-03-19 VITALS
DIASTOLIC BLOOD PRESSURE: 85 MMHG | SYSTOLIC BLOOD PRESSURE: 136 MMHG | HEART RATE: 61 BPM | BODY MASS INDEX: 51.91 KG/M2 | HEIGHT: 63 IN | WEIGHT: 293 LBS | OXYGEN SATURATION: 98 %

## 2020-03-19 LAB
AMPHETAMINE SCREEN, URINE: NORMAL
BARBITURATE SCREEN, URINE: NORMAL
BENZODIAZEPINE SCREEN, URINE: NORMAL
BUPRENORPHINE URINE: NORMAL
COCAINE METABOLITE SCREEN URINE: NORMAL
GABAPENTIN SCREEN, URINE: NORMAL
MDMA URINE: NORMAL
METHADONE SCREEN, URINE: NORMAL
METHAMPHETAMINE, URINE: NORMAL
OPIATE SCREEN URINE: NORMAL
OXYCODONE SCREEN URINE: NORMAL
PHENCYCLIDINE SCREEN URINE: NORMAL
PROPOXYPHENE SCREEN, URINE: NORMAL
THC SCREEN, URINE: NORMAL
TRICYCLIC ANTIDEPRESSANTS, UR: NORMAL

## 2020-03-19 PROCEDURE — G8484 FLU IMMUNIZE NO ADMIN: HCPCS | Performed by: NURSE PRACTITIONER

## 2020-03-19 PROCEDURE — 3017F COLORECTAL CA SCREEN DOC REV: CPT | Performed by: NURSE PRACTITIONER

## 2020-03-19 PROCEDURE — G8417 CALC BMI ABV UP PARAM F/U: HCPCS | Performed by: NURSE PRACTITIONER

## 2020-03-19 PROCEDURE — G8427 DOCREV CUR MEDS BY ELIG CLIN: HCPCS | Performed by: NURSE PRACTITIONER

## 2020-03-19 PROCEDURE — 1036F TOBACCO NON-USER: CPT | Performed by: NURSE PRACTITIONER

## 2020-03-19 PROCEDURE — 99213 OFFICE O/P EST LOW 20 MIN: CPT | Performed by: NURSE PRACTITIONER

## 2020-03-19 PROCEDURE — 80305 DRUG TEST PRSMV DIR OPT OBS: CPT | Performed by: NURSE PRACTITIONER

## 2020-03-19 NOTE — PROGRESS NOTES
REVIEW OF SYSTEMS    Constitutional: []Fever []Sweat []Chills [] Recent Injury [x] Denies all unless marked  HEENT:[x]Headache  [] Head Injury/Hearing Loss  [] Sore Throat  [] Ear Ache/Dizziness  [] Denies all unless marked  Spine:  [] Neck pain  [] Back pain  [] Sciaticia  [x] Denies all unless marked  Cardiovascular:[]Heart Disease []Chest Pain [] Palpitations  [x] Denies all unless marked  Pulmonary: []Shortness of Breath []Cough   [x] Denies all unless marke  Gastrointestinal: []Nausea  []Vomiting  []Abdominal Pain  []Constipation  []Diarrhea  []Dark Bloody Stools  [x] Denies all unless marked  Psychiatric/Behavioral:[] Depression [] Anxiety [x] Denies all unless marked  Genitourinary:   [] Frequency  [] Urgency  [] Incontinence [] Pain with Urination  [x] Denies all unless marked  Extremities: [x]Pain  []Swelling  [] Denies all unless marked  Musculoskeletal: [] Muscle Pain  [] Joint Pain  [] Arthritis [] Muscle Cramps [] Muscle Twitches  [x] Denies all unless marked  Sleep: [] Insomnia [] Snoring [] Restless Legs [] Sleep Apnea  [] Daytime Sleepiness  [x] Denies all unless marked  Skin:[] Rash [] Skin Discoloration [x] Denies all unless marked   Neurological: []Visual Disturbance/Memory Loss [] Loss of Balance [] Slurred Speech/Weakness [] Seizures  [] Vertigo/Dizziness [x] Denies all unless marked

## 2020-03-19 NOTE — PROGRESS NOTES
[facility] Office Note      Patient:   Constance Bales  MR#:    [id number]  Account Number:                         YOB: 1960  Date of Evaluation:  3/19/2020  Time of Note:                          11:49 AM  Primary/Referring Physician:  [address] [place] [address] Physician:  JULIAN Dejesus    FOLLOW UP VISIT    Chief Complaint   Patient presents with    6 Month Follow-Up     Patient states that her headaches have been doing better, however she reports that her carpal tunnel has increased in pain.  Carpal Tunnel    Headache       HISTORY OF PRESENT ILLNESS    Constance Bales is a 60 y.o. female here for follow up of headaches, right arm/shoulder pain, CTS, memory loss, dizziness and facial pain. She is noting worsening CTS symptoms, worse on the right hand side. Noting more numbness and tingling, some weakness at times. Symptoms are waking her up at night. Has been wearing splints but no overly helpful. Headaches, memory loss, dizziness and facial numbness are improved. Right arm/shoulder pain is chronic in nature. She is taking Fioricet prn and this has been helpful for headaches. Memory loss is unchanged, described more as forgetfulness. Does not interfere with ADLs. She has seen neurosurgery for neck and arm pain, not felt surgical at this time. No other complaints.       Past Medical History:   Diagnosis Date    Anxiety     Chronic back pain     GERD (gastroesophageal reflux disease)     Heart valve disorder     Hypertension     Sarcoma of breast ([address]     Type 2 diabetes mellitus without complication (HCC)        Past Surgical History:   Procedure Laterality Date    JOINT REPLACEMENT Bilateral     KNEE SURGERY Bilateral     knee replacement    ROTATOR CUFF REPAIR Right        Family History   Problem Relation Age of Onset    Cancer Mother     Cancer Father         Prostate    Heart Disease Father     Diabetes Maternal Grandmother     Heart Disease Maternal Grandmother        Social History     Socioeconomic History    Marital status:      Spouse name: Not on file    Number of children: Not on file    Years of education: Not on file    Highest education level: Not on file   Occupational History    Not on file   Social Needs    Financial resource strain: Not on file    Food insecurity     Worry: Not on file     Inability: Not on file    Transportation needs     Medical: Not on file     Non-medical: Not on file   Tobacco Use    Smoking status: Never Smoker    Smokeless tobacco: Never Used   Substance and Sexual Activity    Alcohol use: Yes     Comment: occ    Drug use: No    Sexual activity: Yes     Partners: Male   Lifestyle    Physical activity     Days per week: Not on file     Minutes per session: Not on file    Stress: Not on file   Relationships    Social connections     Talks on phone: Not on file     Gets together: Not on file     Attends Advent service: Not on file     Active member of club or organization: Not on file     Attends meetings of clubs or organizations: Not on file     Relationship status: Not on file    Intimate partner violence     Fear of current or ex partner: Not on file     Emotionally abused: Not on file     Physically abused: Not on file     Forced sexual activity: Not on file   Other Topics Concern    Not on file   Social History Narrative    Not on file       Current Outpatient Medications   Medication Sig Dispense Refill    Cyanocobalamin (VITAMIN B 12 PO) Take by mouth daily      atenolol (TENORMIN) 50 MG tablet Take 1 tablet by mouth daily      D3-50 93475 units CAPS Take 1 capsule by mouth once a week      methocarbamol (ROBAXIN) 500 MG tablet Take 500 mg by mouth 3 times daily      butalbital-apap-caffeine -40 MG CAPS Take 1 capsule by mouth every 8 hours as needed for Headaches 30 capsule 1    potassium chloride (KLOR-CON M) 20 MEQ extended release tablet Take 20 mEq by mouth daily      cetirizine (ZYRTEC) 10 MG tablet Take 10 mg by mouth daily      montelukast (SINGULAIR) 10 MG tablet Take 1 tablet by mouth daily      HYDROcodone-acetaminophen (NORCO) 7.5-325 MG per tablet Take 1 tablet by mouth 2 times daily .  albuterol sulfate  (90 Base) MCG/ACT inhaler Inhale into the lungs daily as needed       aspirin 81 MG EC tablet Take 81 mg by mouth      furosemide (LASIX) 40 MG tablet Take 40 mg by mouth as needed       cloNIDine (CATAPRES) 0.1 MG tablet Take 0.1 mg by mouth as needed       lisinopril (PRINIVIL;ZESTRIL) 40 MG tablet 40 mg daily       metFORMIN (GLUCOPHAGE) 500 MG tablet Take 500 mg by mouth as needed       amLODIPine (NORVASC) 5 MG tablet Take 5 mg by mouth daily        No current facility-administered medications for this visit. Allergies   Allergen Reactions    Cefdinir     Levofloxacin      REVIEW OF SYSTEMS  Constitutional: []? Fever []? Sweat []? Chills []? Recent Injury [x]? Denies all unless marked  HEENT:[x]? Headache  []? Head Injury/Hearing Loss  []? Sore Throat  []? Ear Ache/Dizziness  []? Denies all unless marked  Spine:  []? Neck pain  []? Back pain  []? Sciaticia  [x]? Denies all unless marked  Cardiovascular:[]? Heart Disease []? Chest Pain []? Palpitations  [x]? Denies all unless marked  Pulmonary: []? Shortness of Breath []? Cough   [x]? Denies all unless marke  Gastrointestinal: []? Nausea  []? Vomiting  []? Abdominal Pain  []? Constipation  []? Diarrhea  []? Dark Bloody Stools  [x]? Denies all unless marked  Psychiatric/Behavioral:[]? Depression []? Anxiety [x]? Denies all unless marked  Genitourinary:   []? Frequency  []? Urgency  []? Incontinence []? Pain with Urination  [x]? Denies all unless marked  Extremities: [x]? Pain  []? Swelling  []? Denies all unless marked  Musculoskeletal: []? Muscle Pain  []? Joint Pain  []? Arthritis []? Muscle Cramps []? Muscle Twitches  [x]? Denies all unless marked  Sleep: []? Insomnia []? Snoring []? Restless Legs []? pathophysiology and treatment and/or coordination of care of the above diagnoses.

## 2020-03-25 NOTE — PROGRESS NOTES
[address], [facility]  Phone [phone] Fax [phone]     Patient Name: Ronal Ruiz 3/24/2020  : 1960  Age: 60 y.o.   Patient Address: [address] [place] [address]       Patient Phone: [phone] (home)     REFERRAL  Referred to: DME provider of patient's choice  Ronal Ruiz is referred for the following:    DME Equipment HPCPS Code Setting   Auto Adjusting CPAP device with flex or comparable pressure relief per comfort  10cm to 20cm   Heated Humidifier  Patient Choice       Replinishible PAP Supplies, 1 year supply  Item HPCPS Code Frequency   Mask of choice  or  1 per 3 months   Nasal Mask cushion/pillows  or  2 per 30 days   Full Face Mask Interface  1 per 30 days   Headgear  1 per 6 months   Tubing, length of choice  or  1 per 3 months   Water Chamber  1 per 6 months   Chinstrap  1 per 6 months   Disposable Filters  2 per 30 days   Reusable Filters  1 per 6 months     Diagnoses:  Obstructive sleep apnea (G47.33)  Length of Need: Lifetime, 99    Ordering Provider: NIMA Donaldson [place]: [id number]         Signature:       Date: 3/24/2020      Electronically Signed by Figueroa Patrick M.D.

## 2020-04-21 ENCOUNTER — TELEPHONE (OUTPATIENT)
Dept: NEUROLOGY | Age: 60
End: 2020-04-21

## 2020-04-26 ENCOUNTER — NURSE TRIAGE (OUTPATIENT)
Dept: CALL CENTER | Facility: HOSPITAL | Age: 60
End: 2020-04-26

## 2020-04-26 NOTE — TELEPHONE ENCOUNTER
"States she woke up with a stressful day. States chest she is having \"a lot of chest pressure. States she is also having some shortness of breath. States she also has a headache. States she has nitroglycerin because she has a heart history. Asking if she should take one?     Explained if her heart doctor prescribed it and told her to take it for chest pain, okay to try it. If no improvement in five minutes or with any worsening s/s that she needs to be evaluated in ER due to her heart history and present symptoms. She verbalized understanding.     Reason for Disposition  • Difficulty breathing    Additional Information  • Negative: Severe difficulty breathing (e.g., struggling for each breath, speaks in single words)  • Negative: Difficult to awaken or acting confused (e.g., disoriented, slurred speech)  • Negative: Shock suspected (e.g., cold/pale/clammy skin, too weak to stand, low BP, rapid pulse)  • Negative: [1] Chest pain lasts > 5 minutes AND [2] history of heart disease  (i.e., heart attack, bypass surgery, angina, angioplasty, CHF; not just a heart murmur)  • Negative: [1] Chest pain lasts > 5 minutes AND [2] described as crushing, pressure-like, or heavy  • Negative: [1] Chest pain lasts > 5 minutes AND [2] age > 50  • Negative: [1] Chest pain lasts > 5 minutes AND [2] age > 30 AND [3] at least one cardiac risk factor (i.e., hypertension, diabetes, obesity, smoker or strong family history of heart disease)  • Negative: [1] Chest pain lasts > 5 minutes AND [2] not relieved with nitroglycerin  • Negative: Passed out (i.e., lost consciousness, collapsed and was not responding)  • Negative: Heart beating < 50 beats per minute OR > 140 beats per minute  • Negative: Visible sweat on face or sweat dripping down face  • Negative: Sounds like a life-threatening emergency to the triager  • Negative: Followed a chest injury  • Negative: SEVERE chest pain  • Negative: [1] Intermittent  chest pain or \"angina\" AND [2] " "increasing in severity or frequency  (Exception: pains lasting a few seconds)  • Negative: Pain also present in shoulder(s) or arm(s) or jaw  (Exception: pain is clearly made worse by movement)    Answer Assessment - Initial Assessment Questions  1. LOCATION: \"Where does it hurt?\"        Across upper/mid chest  2. RADIATION: \"Does the pain go anywhere else?\" (e.g., into neck, jaw, arms, back)      n/a  3. ONSET: \"When did the chest pain begin?\" (Minutes, hours or days)       n/a  4. PATTERN \"Does the pain come and go, or has it been constant since it started?\"  \"Does it get worse with exertion?\"       n/a  5. DURATION: \"How long does it last\" (e.g., seconds, minutes, hours)      n/  6. SEVERITY: \"How bad is the pain?\"  (e.g., Scale 1-10; mild, moderate, or severe)     - MILD (1-3): doesn't interfere with normal activities      - MODERATE (4-7): interferes with normal activities or awakens from sleep     - SEVERE (8-10): excruciating pain, unable to do any normal activities        An/a  7. CARDIAC RISK FACTORS: \"Do you have any history of heart problems or risk factors for heart disease?\" (e.g., prior heart attack, angina; high blood pressure, diabetes, being overweight, high cholesterol, smoking, or strong family history of heart disease)      n/a  8. PULMONARY RISK FACTORS: \"Do you have any history of lung disease?\"  (e.g., blood clots in lung, asthma, emphysema, birth control pills)      n/a  9. CAUSE: \"What do you think is causing the chest pain?\"      n/a  10. OTHER SYMPTOMS: \"Do you have any other symptoms?\" (e.g., dizziness, nausea, vomiting, sweating, fever, difficulty breathing, cough)        n/a  11. PREGNANCY: \"Is there any chance you are pregnant?\" \"When was your last menstrual period?\"        n/a    Protocols used: CHEST PAIN-ADULT-AH      "

## 2020-05-28 ENCOUNTER — HOSPITAL ENCOUNTER (OUTPATIENT)
Dept: NEUROLOGY | Age: 60
Discharge: HOME | End: 2020-05-28
Payer: MEDICAID

## 2020-05-28 PROCEDURE — 95911 NRV CNDJ TEST 9-10 STUDIES: CPT | Performed by: PSYCHIATRY & NEUROLOGY

## 2020-05-28 PROCEDURE — 95886 MUSC TEST DONE W/N TEST COMP: CPT

## 2020-05-28 PROCEDURE — 95911 NRV CNDJ TEST 9-10 STUDIES: CPT

## 2020-05-28 PROCEDURE — 95886 MUSC TEST DONE W/N TEST COMP: CPT | Performed by: PSYCHIATRY & NEUROLOGY

## 2020-06-15 ENCOUNTER — OFFICE VISIT (OUTPATIENT)
Dept: NEUROSURGERY | Age: 60
End: 2020-06-15
Payer: MEDICAID

## 2020-06-15 VITALS
WEIGHT: 290 LBS | HEIGHT: 63 IN | OXYGEN SATURATION: 95 % | BODY MASS INDEX: 51.38 KG/M2 | HEART RATE: 68 BPM | DIASTOLIC BLOOD PRESSURE: 83 MMHG | SYSTOLIC BLOOD PRESSURE: 137 MMHG

## 2020-06-15 PROBLEM — G47.33 OSA (OBSTRUCTIVE SLEEP APNEA): Status: ACTIVE | Noted: 2020-06-15

## 2020-06-15 PROBLEM — Z99.89 CPAP (CONTINUOUS POSITIVE AIRWAY PRESSURE) DEPENDENCE: Status: ACTIVE | Noted: 2020-06-15

## 2020-06-15 PROCEDURE — 3017F COLORECTAL CA SCREEN DOC REV: CPT | Performed by: NURSE PRACTITIONER

## 2020-06-15 PROCEDURE — 99214 OFFICE O/P EST MOD 30 MIN: CPT | Performed by: NURSE PRACTITIONER

## 2020-06-15 PROCEDURE — G8427 DOCREV CUR MEDS BY ELIG CLIN: HCPCS | Performed by: NURSE PRACTITIONER

## 2020-06-15 PROCEDURE — G8417 CALC BMI ABV UP PARAM F/U: HCPCS | Performed by: NURSE PRACTITIONER

## 2020-06-15 PROCEDURE — 1036F TOBACCO NON-USER: CPT | Performed by: NURSE PRACTITIONER

## 2020-06-15 NOTE — PROGRESS NOTES
unimpaired. Finger-to-nose testing is performed well, without dysmetria. Gait is normal.    [place] Sleepiness Scale    0 = would never doze  1 = slight chance of dozing  2 = moderate chance of dozing  3 = high chance of dozing    Situation Chance of Dozing (0-3)    Sitting and reading       3    Watching TV        1    Sitting, inactive in a public place (e.g. a theatre or a meeting) 1    As a passenger in a car for an hour without a break   3    Lying down to rest in the afternoon when circumstances permit 3    Sitting and talking to someone     0    Sitting quietly after a lunch without alcohol    3    In a car, while stopped for a few minutes in the traffic  0    Total         15    I reviewed the following studies:      []  :  Clinical laboratory test results    []  :  Radiology reports    []  :  Review and summarization of medical records and/or obtain medical records     [x]  :  Previous/recent polysomnogram report(s)    [x]  :  [place] Sleepiness Scale- 15      [x]  :  Compliance download:  She has an auto adjusting CPAP set at a pressure 10-20cm H20. Compliance download shows that she uses device:  80% of the time;  percentage of days with usage >=4 hours:  57%. AHI:  0.5    Assessment:       ICD-10-CM    1. GUILLERMO (obstructive sleep apnea) G47.33    2. CPAP (continuous positive airway pressure) dependence Z99.89    3. Numbness and tingling in both hands R20.0 Amil Nyhan, MD, Neurosurgery, [place]    R20.2    4. Carpal tunnel syndrome on right G56.01 Amil Nyhan, MD, Neurosurgery, [place]        []  :  Stable     []  :  Improved                       []  :  Well controlled              []  :  Resolving     []  :  Resolved     [x]  :  Inadequately controlled- discussed wearing CPAP nightly for greater than 4 hours      []  :  Worsening     []  :  Additional workup planned    Plan:     No orders of the defined types were placed in this encounter.     Orders Placed This Encounter

## 2020-07-07 ENCOUNTER — TRANSCRIBE ORDERS (OUTPATIENT)
Dept: ADMINISTRATIVE | Facility: HOSPITAL | Age: 60
End: 2020-07-07

## 2020-07-07 DIAGNOSIS — Z01.818 PREOP TESTING: Primary | ICD-10-CM

## 2020-07-09 ENCOUNTER — TELEPHONE (OUTPATIENT)
Dept: NEUROSURGERY | Age: 60
End: 2020-07-09

## 2020-07-09 ENCOUNTER — OFFICE VISIT (OUTPATIENT)
Dept: NEUROSURGERY | Age: 60
End: 2020-07-09
Payer: MEDICAID

## 2020-07-09 VITALS
SYSTOLIC BLOOD PRESSURE: 132 MMHG | DIASTOLIC BLOOD PRESSURE: 82 MMHG | HEIGHT: 63 IN | WEIGHT: 290 LBS | BODY MASS INDEX: 51.38 KG/M2 | HEART RATE: 88 BPM

## 2020-07-09 PROCEDURE — 1036F TOBACCO NON-USER: CPT | Performed by: NEUROLOGICAL SURGERY

## 2020-07-09 PROCEDURE — 3017F COLORECTAL CA SCREEN DOC REV: CPT | Performed by: NEUROLOGICAL SURGERY

## 2020-07-09 PROCEDURE — 99203 OFFICE O/P NEW LOW 30 MIN: CPT | Performed by: NEUROLOGICAL SURGERY

## 2020-07-09 PROCEDURE — G8417 CALC BMI ABV UP PARAM F/U: HCPCS | Performed by: NEUROLOGICAL SURGERY

## 2020-07-09 PROCEDURE — G8427 DOCREV CUR MEDS BY ELIG CLIN: HCPCS | Performed by: NEUROLOGICAL SURGERY

## 2020-07-09 NOTE — H&P
[address] [facility] Neurosurgery  History and Physical        Chief Complaint   Patient presents with    Consultation     Wrist pain. report in chart. HISTORY OF PRESENT ILLNESS:      The patient is a 63 y.o. female who presents for neurosurgical evaluation of right carpal tunnel syndrome. She has a history of chronic pain however she has developed bothersome numbness in her right hand that will awaken her at night and interfere with her ability to drive. She has tried wrist splints without much benefit. She has undergone an EMG/NCS that showed moderately severe right and mild left carpal tunnel syndrome. MEDICAL HISTORY:             Past Medical History:   Diagnosis Date    Anxiety     Chronic back pain     GERD (gastroesophageal reflux disease)     Heart valve disorder     Hypertension     [facility]coma [facility] ([facility]     Type 2 diabetes mellitus without complication ([facility]        Past Surgical History:   Procedure Laterality Date    JOINT REPLACEMENT Bilateral     KNEE SURGERY Bilateral     knee replacement    ROTATOR CUFF REPAIR Right          Current Outpatient Medications:     Cyanocobalamin (VITAMIN B 12 PO), Take by mouth daily, Disp: , Rfl:     atenolol (TENORMIN) 50 MG tablet, Take 1 tablet by mouth daily, Disp: , Rfl:     D3-50 90787 units CAPS, Take 1 capsule by mouth once a week, Disp: , Rfl:     methocarbamol (ROBAXIN) 500 MG tablet, Take 500 mg by mouth 3 times daily, Disp: , Rfl:     butalbital-apap-caffeine -40 MG CAPS, Take 1 capsule by mouth every 8 hours as needed for Headaches, Disp: 30 capsule, Rfl: 1    potassium chloride (KLOR-CON M) 20 MEQ extended release tablet, Take 20 mEq by mouth daily, Disp: , Rfl:     cetirizine (ZYRTEC) 10 MG tablet, Take 10 mg by mouth daily, Disp: , Rfl:     montelukast (SINGULAIR) 10 MG tablet, Take 1 tablet by mouth daily, Disp: , Rfl:     HYDROcodone-acetaminophen (NORCO) 7.5-325 MG per tablet, Take 1 tablet by mouth 2 times daily . , Disp: , Rfl:     albuterol sulfate  (90 Base) MCG/ACT inhaler, Inhale into the lungs daily as needed , Disp: , Rfl:     aspirin 81 MG EC tablet, Take 81 mg by mouth, Disp: , Rfl:     furosemide (LASIX) 40 MG tablet, Take 40 mg by mouth as needed , Disp: , Rfl:     cloNIDine (CATAPRES) 0.1 MG tablet, Take 0.1 mg by mouth as needed , Disp: , Rfl:     lisinopril (PRINIVIL;ZESTRIL) 40 MG tablet, 40 mg daily , Disp: , Rfl:     metFORMIN (GLUCOPHAGE) 500 MG tablet, Take 500 mg by mouth as needed , Disp: , Rfl:     amLODIPine (NORVASC) 5 MG tablet, Take 5 mg by mouth daily , Disp: , Rfl:     Allergies:  Cefdinir and Levofloxacin    Social History:   Social History     Tobacco Use   Smoking Status Never Smoker   Smokeless Tobacco Never Used     Social History     Substance and Sexual Activity   Alcohol Use Yes    Comment: occ         Family History:   Family History   Problem Relation Age of Onset    Cancer Mother     Cancer Father         Prostate    Heart Disease Father     Diabetes Maternal Grandmother     Heart Disease Maternal Grandmother        REVIEW OF SYSTEMS:    Constitutional: Negative. HENT: Negative. Eyes: Negative. Respiratory: Negative. Cardiovascular: Negative. Gastrointestinal: Negative. Genitourinary: Negative. Musculoskeletal: Positive for joint pain. Skin: Negative. Neurological: Negative. Endo/Heme/Allergies: Negative. Psychiatric/Behavioral: Negative. Review of Systems was obtained by the medical assistant and reviewed by myself. PHYSICAL EXAM:    Vitals:    07/09/20 1427   BP: 132/82   Pulse: 88       Constitutional: Obese, no acute distress.    Eyes - conjunctiva normal.  Pupils react to light  Ear, nose,throat -Normal pinna and tragus, No scars, or lesions over external nose or ears, no obvious atrophy of tongue  Neck- symmetric, trachea midline, no jugular vein distension  Respiration- chest wall symmetric, normal effort without use of accessory alternatives to surgery and risks of nerve injury, bleeding, infection and poor wound healing. All questions were answered to the patient's stated satisfaction and they requested that we proceed with surgery. The patient was counseled at length about the risks of isaias Covid-19 during their perioperative period and any recovery window from their procedure. The patient was made aware that isaias Covid-19  may worsen their prognosis for recovering from their procedure  and lend to a higher morbidity and/or mortality risk. All material risks, benefits, and reasonable alternatives including postponing the procedure were discussed. The patient does wish to proceed with the procedure at this time.             Mirta Fung MD

## 2020-07-09 NOTE — PROGRESS NOTES
-- DO NOT REPLY / DO NOT REPLY ALL --  -- Message is from the [facility]--    COVID-19 Universal Screening: Negative    General Patient Message      Reason for Call: The pharmacy called in stating she needs clarification on the cough syrup because she stated it is not recommend for elderly. Please contact the pharmacy.      Caller Information       Type Contact Phone    03/24/2020 08:52 AM Phone (Incoming) [address] [facility] (Pharmacy) [phone]          Alternative phone number: none     Turnaround time given to caller:   \"This message will be sent to [state Provider's name]. The clinical team will fulfill your request as soon as they review your message.\"     [facility] Neurosurgery  Office Visit        Chief Complaint   Patient presents with   [facility]     Wrist pain. report in chart. HISTORY OF PRESENT ILLNESS:      The patient is a 63 y.o. female who presents for neurosurgical evaluation of right carpal tunnel syndrome. She has a history of chronic pain however she has developed bothersome numbness in her right hand that will awaken her at night and interfere with her ability to drive. She has tried wrist splints without much benefit. She has undergone an EMG/NCS that showed moderately severe right and mild left carpal tunnel syndrome. MEDICAL HISTORY:             Past Medical History:   Diagnosis Date    Anxiety     Chronic back pain     GERD (gastroesophageal reflux disease)     Heart valve disorder     Hypertension     [facility] ([facility] [facility]     Type 2 diabetes mellitus without complication ([facility] [facility]        Past Surgical History:   Procedure Laterality Date    JOINT REPLACEMENT Bilateral     KNEE SURGERY Bilateral     knee replacement    ROTATOR CUFF REPAIR Right          Current Outpatient Medications:     Cyanocobalamin (VITAMIN B 12 PO), Take by mouth daily, Disp: , Rfl:     atenolol (TENORMIN) 50 MG tablet, Take 1 tablet by mouth daily, Disp: , Rfl:     D3-50 08514 units CAPS, Take 1 capsule by mouth once a week, Disp: , Rfl:     methocarbamol (ROBAXIN) 500 MG tablet, Take 500 mg by mouth 3 times daily, Disp: , Rfl:     butalbital-apap-caffeine -40 MG CAPS, Take 1 capsule by mouth every 8 hours as needed for Headaches, Disp: 30 capsule, Rfl: 1    potassium chloride (KLOR-CON M) 20 MEQ extended release tablet, Take 20 mEq by mouth daily, Disp: , Rfl:     cetirizine (ZYRTEC) 10 MG tablet, Take 10 mg by mouth daily, Disp: , Rfl:     montelukast (SINGULAIR) 10 MG tablet, Take 1 tablet by mouth daily, Disp: , Rfl:     HYDROcodone-acetaminophen (NORCO) 7.5-325 MG per tablet, Take 1 tablet by mouth 2 times daily . , Disp: , Rfl:    albuterol sulfate  (90 Base) MCG/ACT inhaler, Inhale into the lungs daily as needed , Disp: , Rfl:     aspirin 81 MG EC tablet, Take 81 mg by mouth, Disp: , Rfl:     furosemide (LASIX) 40 MG tablet, Take 40 mg by mouth as needed , Disp: , Rfl:     cloNIDine (CATAPRES) 0.1 MG tablet, Take 0.1 mg by mouth as needed , Disp: , Rfl:     lisinopril (PRINIVIL;ZESTRIL) 40 MG tablet, 40 mg daily , Disp: , Rfl:     metFORMIN (GLUCOPHAGE) 500 MG tablet, Take 500 mg by mouth as needed , Disp: , Rfl:     amLODIPine (NORVASC) 5 MG tablet, Take 5 mg by mouth daily , Disp: , Rfl:     Allergies:  Cefdinir and Levofloxacin    Social History:   Social History     Tobacco Use   Smoking Status Never Smoker   Smokeless Tobacco Never Used     Social History     Substance and Sexual Activity   Alcohol Use Yes    Comment: occ         Family History:   Family History   Problem Relation Age of Onset    Cancer Mother     Cancer Father         Prostate    Heart Disease Father     Diabetes Maternal Grandmother     Heart Disease Maternal Grandmother        REVIEW OF SYSTEMS:    Constitutional: Negative. HENT: Negative. Eyes: Negative. Respiratory: Negative. Cardiovascular: Negative. Gastrointestinal: Negative. Genitourinary: Negative. Musculoskeletal: Positive for joint pain. Skin: Negative. Neurological: Negative. Endo/Heme/Allergies: Negative. Psychiatric/Behavioral: Negative. Review of Systems was obtained by the medical assistant and reviewed by myself. PHYSICAL EXAM:    Vitals:    07/09/20 1427   BP: 132/82   Pulse: 88       Constitutional: Obese, no acute distress.    Eyes - conjunctiva normal.  Pupils react to light  Ear, nose,throat -Normal pinna and tragus, No scars, or lesions over external nose or ears, no obvious atrophy of tongue  Neck- symmetric, trachea midline, no jugular vein distension  Respiration- chest wall symmetric, normal effort without use of accessory muscles  Musculoskeletal - no significant wasting of muscles noted, no bony deformities, symmetric bulk  Extremities- no clubbing, cyanosis or edema  Skin - warm, dry, and intact. No rash,erythema, or pallor. Psychiatric - mood, affect, and behavior appear normal.       NEUROLOGIC EXAM:    MENTAL STATUS: Alert, oriented, thought content appropriate    CRANIAL NERVES: PERRL, EOMI, symmetric facies, tongue midline    MOTOR:     Right Upper Extremity:    Deltoid: 5/5  Biceps: 5/5  Triceps: 5/5  Wrist Extension: 5/5  Finger Abduction: 5/5  APB: 5/5    Left Upper Extremity:    Deltoid: 5/5  Biceps: 5/5  Triceps: 5/5  Wrist Extension: 5/5  Finger Abduction: 5/5  APB: 5/5    Right Lower Extremity:    Hip Flexion: 5/5  Knee Extension: 5/5  Ankle Plantarflexion: 5/5  Ankle Dorsiflexion: 5/5      Left Lower Extremity:    Hip Flexion: 5/5  Knee Extension: 5/5  Ankle Plantarflexion: 5/5  Ankle Dorsiflexion: 5/5      SOMATOSENSORY:     Right Upper Extremity: normal light touch sensation  Left Upper Extremity: normal light touch sensation  Right Lower Extremity: normal light touch sensation  Left Lower Extremity: normal light touch sensation      REFLEXES:    Biceps: 2+  Patella: 2+    Eddy's: Negative      COORDINATION and GAIT:    Gait: Normal        ASSESSMENT AND PLAN:  This is a 63 y.o. female  who presents for neurosurgical evaluation of right carpal tunnel syndrome. She has fairly classic symptoms with numbness and pain in her right hand with sleeping and driving. She has tried wrist splints without much benefit. Her EMG confirms moderately severe carpal tunnel syndrome on the right and mild disease on the left. I explained that in the absence of weakness or detectable sensory loss, surgery remains elective, however she voiced concern about the difficulty she is having with driving and she wishes to proceed with surgery.   The carpal tunnel release procedure was explained to the patient in detail, including alternatives to surgery and risks of nerve injury, bleeding, infection and poor wound healing. All questions were answered to the patient's stated satisfaction and they requested that we proceed with surgery. The patient was counseled at length about the risks of isaias Covid-19 during their perioperative period and any recovery window from their procedure. The patient was made aware that isaias Covid-19  may worsen their prognosis for recovering from their procedure  and lend to a higher morbidity and/or mortality risk. All material risks, benefits, and reasonable alternatives including postponing the procedure were discussed. The patient does wish to proceed with the procedure at this time.             Rosa Maria Jackson MD

## 2020-07-10 ENCOUNTER — TELEPHONE (OUTPATIENT)
Dept: NEUROSURGERY | Age: 60
End: 2020-07-10

## 2020-07-10 ENCOUNTER — LAB (OUTPATIENT)
Dept: LAB | Facility: HOSPITAL | Age: 60
End: 2020-07-10

## 2020-07-10 PROCEDURE — C9803 HOPD COVID-19 SPEC COLLECT: HCPCS | Performed by: PAIN MEDICINE

## 2020-07-10 PROCEDURE — U0003 INFECTIOUS AGENT DETECTION BY NUCLEIC ACID (DNA OR RNA); SEVERE ACUTE RESPIRATORY SYNDROME CORONAVIRUS 2 (SARS-COV-2) (CORONAVIRUS DISEASE [COVID-19]), AMPLIFIED PROBE TECHNIQUE, MAKING USE OF HIGH THROUGHPUT TECHNOLOGIES AS DESCRIBED BY CMS-2020-01-R: HCPCS | Performed by: PAIN MEDICINE

## 2020-07-12 LAB
COVID LABCORP PRIORITY: NORMAL
SARS-COV-2 RNA RESP QL NAA+PROBE: NOT DETECTED

## 2020-07-14 ENCOUNTER — TELEPHONE (OUTPATIENT)
Dept: NEUROSURGERY | Age: 60
End: 2020-07-14

## 2020-07-14 NOTE — TELEPHONE ENCOUNTER
I received a notice from insurance stating that her upcoming surgery was approved from 7- until 10-. Scanned into media.

## 2020-08-06 ENCOUNTER — HOSPITAL ENCOUNTER (OUTPATIENT)
Dept: WOMENS IMAGING | Age: 60
Discharge: HOME | End: 2020-08-06
Payer: MEDICAID

## 2020-08-06 ENCOUNTER — OFFICE VISIT (OUTPATIENT)
Dept: SURGERY | Age: 60
End: 2020-08-06
Payer: MEDICAID

## 2020-08-06 VITALS
OXYGEN SATURATION: 94 % | HEIGHT: 63 IN | DIASTOLIC BLOOD PRESSURE: 82 MMHG | WEIGHT: 292 LBS | BODY MASS INDEX: 51.74 KG/M2 | HEART RATE: 83 BPM | SYSTOLIC BLOOD PRESSURE: 138 MMHG

## 2020-08-06 PROCEDURE — 1036F TOBACCO NON-USER: CPT | Performed by: PHYSICIAN ASSISTANT

## 2020-08-06 PROCEDURE — 99213 OFFICE O/P EST LOW 20 MIN: CPT | Performed by: PHYSICIAN ASSISTANT

## 2020-08-06 PROCEDURE — G8417 CALC BMI ABV UP PARAM F/U: HCPCS | Performed by: PHYSICIAN ASSISTANT

## 2020-08-06 PROCEDURE — 77063 BREAST TOMOSYNTHESIS BI: CPT

## 2020-08-06 PROCEDURE — G8427 DOCREV CUR MEDS BY ELIG CLIN: HCPCS | Performed by: PHYSICIAN ASSISTANT

## 2020-08-06 PROCEDURE — 3017F COLORECTAL CA SCREEN DOC REV: CPT | Performed by: PHYSICIAN ASSISTANT

## 2020-08-06 NOTE — PROGRESS NOTES
HPI:  Aiden Szymanski is in for yearly follow-up breast check. She has not noticed any changes in her breasts. EXAMINATION: NOEL DIGITAL SCREEN W OR WO CAD BILATERAL 8/6/2020 2:00 PM    HISTORY: SCREENING BILATERAL DIGITAL MAMMOGRAM WITH TOMOSYNTHESIS    8/6/2020 12:38 PM    CLINICAL HISTORY: Screening.      COMPARISON STUDIES: April 26, 2019 July 1, 2016. FINDINGS:    Digital CC and MLO views of bilateral breasts were obtained. Tomosynthesis in the MLO and CC projections was also performed. The breast tissue is almost entirely fat (less than 25% glandular    tissue) consistent with a type A parenchymal pattern. No suspicious    masses or calcifications are identified. Chronic skin thickening is    present in the right breast. This is been present since June 30, 2015. This study was interpreted with CAD. IMPRESSION AND RECOMMENDATION:    No mammographic evidence of malignancy. Recommendation is for the    patient to return for routine mammography in one year or sooner, if    clinically indicated. Assessment: BI-RADS Category 2 benign        BREAST EXAM:  On examination, she has fibrocystic changes throughout both breasts, no dominant masses, no skin or nipple changes and no axillary adenopathy. I see nothing suspicious for breast cancer. ASSESSMENT:  Benign fibrocystic changes              PLAN:  I will plan to see her back in 1 year for physical exam and bilateral mammograms. She will contact me if anything significant changes. 15 minutes spent, which includes face to face with patient, record review, evaluation, planning, and education. I spent over 50% of this visit counseling patient.

## 2020-08-19 ENCOUNTER — OFFICE VISIT (OUTPATIENT)
Dept: NEUROSURGERY | Age: 60
End: 2020-08-19
Payer: MEDICAID

## 2020-08-19 VITALS
BODY MASS INDEX: 51.91 KG/M2 | HEIGHT: 63 IN | WEIGHT: 293 LBS | HEART RATE: 71 BPM | SYSTOLIC BLOOD PRESSURE: 140 MMHG | DIASTOLIC BLOOD PRESSURE: 87 MMHG

## 2020-08-19 PROCEDURE — G8417 CALC BMI ABV UP PARAM F/U: HCPCS | Performed by: NEUROLOGICAL SURGERY

## 2020-08-19 PROCEDURE — 3017F COLORECTAL CA SCREEN DOC REV: CPT | Performed by: NEUROLOGICAL SURGERY

## 2020-08-19 PROCEDURE — 1036F TOBACCO NON-USER: CPT | Performed by: NEUROLOGICAL SURGERY

## 2020-08-19 PROCEDURE — G8427 DOCREV CUR MEDS BY ELIG CLIN: HCPCS | Performed by: NEUROLOGICAL SURGERY

## 2020-08-19 PROCEDURE — 99213 OFFICE O/P EST LOW 20 MIN: CPT | Performed by: NEUROLOGICAL SURGERY

## 2020-08-19 NOTE — PROGRESS NOTES
NEUROSURGERY FOLLOW UP      Chief Complaint:   Chief Complaint   Patient presents with    Follow-up     here to discuss possible surgical options. Interval Update:  Return visit to discuss symptoms and surgical options. She has been diagnosed with carpal tunnel syndrome that is worse on the right, but today her main complain is focal pain on the radial aspect of her left wrist and forearm. She is inquiring about surgical options to address her pain. She states she has had similar pain previously and had an injection from an orthopedist in [address] and this was helpful for awhile. She also states that she had x-rays done that showed a \"bone spur\". She still endorses numbness in her hands that will awaken her at night, but this is not nearly as bothersome as the pain in her wrist.  She states she is not prepared to have any surgery for her carpal tunnel syndrome. HPI: The patient is a 63 y.o. female who presents for neurosurgical evaluation of right carpal tunnel syndrome. She has a history of chronic pain however she has developed bothersome numbness in her right hand that will awaken her at night and interfere with her ability to drive. She has tried wrist splints without much benefit. She has undergone an EMG/NCS that showed moderately severe right and mild left carpal tunnel syndrome. ROS:    Constitutional: Negative. HENT: Negative. Eyes: Negative. Respiratory: Negative. Cardiovascular: Negative. Gastrointestinal: Negative. Genitourinary: Negative. Musculoskeletal: Positive for back pain, joint pain and neck pain. Skin: Negative. Neurological: Negative. Endo/Heme/Allergies: Negative. Psychiatric/Behavioral: Negative.         Review of systems was obtained by the medical assistant and reviewed by myself.     Objective:    BP (!) 140/87   Pulse 71   Ht 5' 3\" (1.6 m)   Wt 294 lb (133.4 kg)   BMI 52.08 kg/m²         Physical Exam:    General: alert, cooperative, no distress  Cardiorespiratory: unlabored breathing  Extremities: Tenderness to palpation and mild swelling around the radiocarpal joint. Pain with flexion and extension of the right wrist.        Neurologic Exam:    Mental Status: Alert, oriented, thought content appropriate  Cranial Nerves: PERRL, EOMI, symmetric facies, tongue midline  Motor: Motor exam is symmetrical 5 out of 5 all extremities bilaterally  Somatosensory: normal light touch sensation        Assessment and Plan:  64 y/o F with EMG-confirmed carpal tunnel syndrome, R>L who returns for new pain in her left wrist and forearm. I explained that wrist pain and joint pain were not within the scope of neurosurgical practice and I have placed a referral to orthopedic surgery (her preference was for Dr. Preeti Gifford in [facility] who has previously evaluated her knees). I advised her that in the absence of weakness or muscle atrophy, surgery for her carpal tunnel syndrome remains elective and that she may return on an as-needed basis if she wishes to discuss carpal tunnel surgery further.         Electronically signed by Gonzales Manrique MD on 8/19/2020 at 11:11 AM

## 2020-09-14 ENCOUNTER — OFFICE VISIT (OUTPATIENT)
Dept: NEUROSURGERY | Age: 60
End: 2020-09-14
Payer: MEDICAID

## 2020-09-14 VITALS
BODY MASS INDEX: 51.91 KG/M2 | HEART RATE: 70 BPM | HEIGHT: 63 IN | WEIGHT: 293 LBS | SYSTOLIC BLOOD PRESSURE: 120 MMHG | DIASTOLIC BLOOD PRESSURE: 69 MMHG

## 2020-09-14 PROCEDURE — 1036F TOBACCO NON-USER: CPT | Performed by: NURSE PRACTITIONER

## 2020-09-14 PROCEDURE — G8427 DOCREV CUR MEDS BY ELIG CLIN: HCPCS | Performed by: NURSE PRACTITIONER

## 2020-09-14 PROCEDURE — 99213 OFFICE O/P EST LOW 20 MIN: CPT | Performed by: NURSE PRACTITIONER

## 2020-09-14 PROCEDURE — 3017F COLORECTAL CA SCREEN DOC REV: CPT | Performed by: NURSE PRACTITIONER

## 2020-09-14 PROCEDURE — G8417 CALC BMI ABV UP PARAM F/U: HCPCS | Performed by: NURSE PRACTITIONER

## 2020-09-14 NOTE — PROGRESS NOTES
[facility] Sleep Follow Up Encounter      Information:   Patient Name: Megan Lowery  :   1960  Age:   63 y.o. MRN:   [id number]  Account #:  [de-identified]  Today:                20    Provider:  JULIAN Castillo    Chief Complaint   Patient presents with    Follow-up        Subjective: Megan Lowery is a 63 y.o. female  with a history of mild GUILLERMO, headaches, carpal tunnel syndrome, memory loss, dizziness who comes in for a sleep clinic follow up. The compliance report indicates that she  is averaging  4 hours of CPAP use per day. She  averages 4-5 hours of sleep per night. She reports that consistent CPAP use has somewhat alleviated the previous GUILLERMO symptoms.     Location or symptom: GUILLERMO  Onset: 6 months ago  Timing:  q hs  Severity: 2 night HST 2020 with AHI 6.7 and 7.2   Associated:  Snoring, witnessed apneas, and excessive daytime somnolence  Alleviated:  CPAP    Objective:     Past Medical History:   Diagnosis Date    Anxiety     Chronic back pain     GERD (gastroesophageal reflux disease)     Heart valve disorder     Hypertension     Sarcoma of breast ([address]     Type 2 diabetes mellitus without complication ([facility] [address]        Past Surgical History:   Procedure Laterality Date    JOINT REPLACEMENT Bilateral     KNEE SURGERY Bilateral     knee replacement    ROTATOR CUFF REPAIR Right        Recent Hospitalizations  ·     Significant Injuries  ·     Family History   Problem Relation Age of Onset    Cancer Mother     Cancer Father         Prostate    Heart Disease Father     Diabetes Maternal Grandmother     Heart Disease Maternal Grandmother        Social History  Social History     Tobacco Use   Smoking Status Never Smoker   Smokeless Tobacco Never Used     Social History     Substance and Sexual Activity   Alcohol Use Yes    Comment: occ     Social History     Substance and Sexual Activity   Drug Use No         Current Outpatient Medications   Medication Sig Dispense Refill    Cyanocobalamin (VITAMIN B 12 PO) Take by mouth daily      atenolol (TENORMIN) 50 MG tablet Take 1 tablet by mouth daily      D3-50 91573 units CAPS Take 1 capsule by mouth once a week      methocarbamol (ROBAXIN) 500 MG tablet Take 500 mg by mouth 3 times daily      butalbital-apap-caffeine -40 MG CAPS Take 1 capsule by mouth every 8 hours as needed for Headaches 30 capsule 1    potassium chloride (KLOR-CON M) 20 MEQ extended release tablet Take 20 mEq by mouth daily      cetirizine (ZYRTEC) 10 MG tablet Take 10 mg by mouth daily      montelukast (SINGULAIR) 10 MG tablet Take 1 tablet by mouth daily      HYDROcodone-acetaminophen (NORCO) 7.5-325 MG per tablet Take 1 tablet by mouth 2 times daily .  albuterol sulfate  (90 Base) MCG/ACT inhaler Inhale into the lungs daily as needed       aspirin 81 MG EC tablet Take 81 mg by mouth      furosemide (LASIX) 40 MG tablet Take 40 mg by mouth as needed       cloNIDine (CATAPRES) 0.1 MG tablet Take 0.1 mg by mouth as needed       lisinopril (PRINIVIL;ZESTRIL) 40 MG tablet 40 mg daily       metFORMIN (GLUCOPHAGE) 500 MG tablet Take 500 mg by mouth as needed       amLODIPine (NORVASC) 5 MG tablet Take 5 mg by mouth daily        No current facility-administered medications for this visit. Allergies:  Cefdinir and Levofloxacin    Review of Systems:   Constitutional: []? Fever []? Sweat []? Chills []? Recent Injury [x]? Denies all unless marked  HEENT:[]? Headache  []? Head Injury/Hearing Loss  []? Sore Throat  []? Ear Ache/Dizziness  [x]? Denies all unless marked  Spine:  []? Neck pain  []? Back pain  []? Sciaticia  [x]? Denies all unless marked  Cardiovascular:[]? Heart Disease []? Chest Pain []? Palpitations  [x]? Denies all unless marked  Pulmonary: []? Shortness of Breath []? Cough   [x]? Denies all unless marke  Gastrointestinal: []? Nausea  []? Vomiting  []? Abdominal Pain  []? Constipation  []? Diarrhea  []? Dark Bloody Stools  [x]?  Denies all unless marked  Psychiatric/Behavioral:[]? Depression []? Anxiety [x]? Denies all unless marked  Genitourinary:   []? Frequency  []? Urgency  []? Incontinence []? Pain with Urination  [x]? Denies all unless marked  Extremities: []? Pain  []? Swelling  [x]? Denies all unless marked  Musculoskeletal: []? Muscle Pain  []? Joint Pain  []? Arthritis []? Muscle Cramps []? Muscle Twitches  [x]? Denies all unless marked  Sleep: []? Insomnia []? Snoring []? Restless Legs []? Sleep Apnea  []? Daytime Sleepiness  [x]? Denies all unless marked  Skin:[]? Rash []? Skin Discoloration [x]? Denies all unless marked   Neurological: []? Visual Disturbance/Memory Loss []? Loss of Balance []? Slurred Speech/Weakness []? Seizures  []? Vertigo/Dizziness [x]? Denies all unless marked    The MA has completed the ROS with the patient. I have reviewed it in its' entirety with the patient and agree with the documentation. Examination:  Vitals:  /69   Pulse 70   Ht 5' 3\" (1.6 m)   Wt 293 lb (132.9 kg)   BMI 51.90 kg/m²   General appearance:  alert and cooperative with exam  HEENT:  PERRLA, EOMI, Sclera clear, anicteric, Oropharynx clear, no lesions, Neck supple with midline trachea and Trachea midline  Heart[de-identified]  regular rate and rhythm  Lungs:  clear to auscultation bilaterally  Extremities:  extremities normal, atraumatic, no cyanosis or edema  Neurologic:  Extraocular movements are intact without nystagmus. Visual fields are full to confrontation. Facial movements are symmetrical and normal.  Speech is precise. Extremity strength is normal in both uppers and lowers. Deep tendon reflexes are intact and symmetrical.  Rapid alternating movements are unimpaired. Finger-to-nose testing is performed well, without dysmetria.   Gait is normal.      I reviewed the following studies:      []  :  Clinical laboratory test results    []  :  Radiology reports    []  :  Review and summarization of medical records and/or obtain medical records [x]  :  Previous/recent polysomnogram report(s)    []  :  [place] Sleepiness Scale      [x]  :  Compliance download:  She has an auto adjusting CPAP set at a pressure 10-20cm H20. Compliance download shows that she uses device:  73% of the time;  percentage of days with usage >=4 hours:  37%. AHI:  0.3    Assessment:       ICD-10-CM    1. GUILLERMO (obstructive sleep apnea)  G47.33    2. CPAP (continuous positive airway pressure) dependence  Z99.89         []  :  Stable     []  :  Improved                       []  :  Well controlled              [x]  :  Resolving     []  :  Resolved     []  :  Inadequately controlled     []  :  Worsening     []  :  Additional workup planned    Plan:     No orders of the defined types were placed in this encounter. No orders of the defined types were placed in this encounter. 1.   Patient advised of the etiology,  pathophysiology, diagnosis, treatment options, and risks of untreated GUILLERMO. Risks may include, but are not limited to  hypertension, coronary artery disease, diabetes, stroke, weight gain, impaired cognition, daytime somnolence,  and motor vehicle accidents. Advised to abstain from driving or operating heavy machinery when drowsy and the use of respiratory suppressants. 2.  The following educational material has been included in this visit after visit summary for your review: GUILLERMO/PAP guidelines-Discussed with the patient and all questions fully answered. 3.  The current medical regimen is effective;  continue present plan. Discussed wearing CPAP nightly for greater than 4 hours. 4.  Return in about 3 months (around 12/14/2020) for Sleep patient follow up, follow up, sooner if worsening. JULIAN Peña     Note:  A total of >50% (>8 minutes) of 15 minutes was spent discussing the pathophysiology and treatment and/or coordination of care of the above diagnoses. This dictation was generated by voice recognition computer software.   Although all attempts are made to edit the dictation for accuracy, there may be errors in the transcription that are not intended.

## 2020-09-14 NOTE — PATIENT INSTRUCTIONS
Patient Education        Learning About CPAP for Sleep Apnea  What is CPAP? CPAP is a small machine that you use at home every night while you sleep. It increases air pressure in your throat to keep your airway open. When you have sleep apnea, this can help you sleep better so you feel much better. CPAP stands for \"continuous positive airway pressure. \"  The CPAP machine will have one of the following:  · A mask that covers your nose and mouth  · Prongs that fit into your nose  · A mask that covers your nose only, the most common type. This type is called NCPAP. The N stands for \"nasal.\"  Why is it done? CPAP is usually the best treatment for obstructive sleep apnea. It is the first treatment choice and the most widely used. Your doctor may suggest CPAP if you have:  · Moderate to severe sleep apnea. · Sleep apnea and coronary artery disease (CAD). · Sleep apnea and heart failure. How does it help? · CPAP can help you have more normal sleep, so you feel less sleepy and more alert during the daytime. · CPAP may help keep heart failure or other heart problems from getting worse. · CPAP may help lower your blood pressure. · If you use CPAP, your bed partner may also sleep better because you are not snoring or restless. What are the side effects? Some people who use CPAP have:  · A dry or stuffy nose and a sore throat. · Irritated skin on the face. · Sore eyes. · Bloating. If you have any of these problems, work with your doctor to fix them. Here are some things you can try:  · Be sure the mask or nasal prongs fit well. · See if your doctor can adjust the pressure of your CPAP. · If your nose is dry, try a humidifier. · If your nose is runny or stuffy, try decongestant medicine or a steroid nasal spray. Be safe with medicines. Read and follow all instructions on the label. Do not use the medicine longer than the label says. If these things do not help, you might try a different type of machine. Some machines have air pressure that adjusts on its own. Others have air pressures that are different when you breathe in than when you breathe out. This may reduce discomfort caused by too much pressure in your nose. Where can you learn more? Go to https://chchapo.[email or web address] org and sign in to your Patient Feed account. Enter N045 in the Twingly box to learn more about \"Learning About CPAP for Sleep Apnea. \"     If you do not have an account, please click on the \"Sign Up Now\" link. Current as of: February 24, 2020               Content Version: 12.5  © 2006-2020 [employer]. Care instructions adapted under license by [facility] ([facility]). If you have questions about a medical condition or this instruction, always ask your healthcare professional. [address] any warranty or liability for your use of this information. Patient Education        Sleep Apnea: Care Instructions  Your Care Instructions     Sleep apnea means that you frequently stop breathing for 10 seconds or longer during sleep. It can be mild to severe, based on the number of times an hour that you stop breathing or have slowed breathing. Blocked or narrowed airways in your nose, mouth, or throat can cause sleep apnea. Your airway can become blocked when your throat muscles and tongue relax during sleep. You can treat sleep apnea at home by making lifestyle changes. You also can use a CPAP breathing machine that keeps tissues in the throat from blocking your airway. Or your doctor may suggest that you use a breathing device while you sleep. It helps keep your airway open. This could be a device that you put in your mouth. In some cases, surgery may be needed to remove enlarged tissues in the throat. Follow-up care is a key part of your treatment and safety. Be sure to make and go to all appointments, and call your doctor if you are having problems.  It's also a good idea to know your test trying a device such as CPAP. · You are having problems using a CPAP or similar machine. Where can you learn more? Go to https://chpepiceweb.[email or web address] org and sign in to your [employer]om account. Enter X020 in the [facility] box to learn more about \"Sleep Apnea: Care Instructions. \"     If you do not have an account, please click on the \"Sign Up Now\" link. Current as of: February 24, 2020               Content Version: 12.5  © 2006-2020 Healthwise, Incorporated. Care instructions adapted under license by [facility] ([facility]). If you have questions about a medical condition or this instruction, always ask your healthcare professional. [address] any warranty or liability for your use of this information.

## 2020-12-14 ENCOUNTER — OFFICE VISIT (OUTPATIENT)
Dept: NEUROSURGERY | Age: 60
End: 2020-12-14
Payer: MEDICAID

## 2020-12-14 VITALS
DIASTOLIC BLOOD PRESSURE: 79 MMHG | HEART RATE: 79 BPM | HEIGHT: 63 IN | BODY MASS INDEX: 51.91 KG/M2 | OXYGEN SATURATION: 98 % | WEIGHT: 293 LBS | TEMPERATURE: 98.9 F | SYSTOLIC BLOOD PRESSURE: 148 MMHG

## 2020-12-14 PROCEDURE — 1036F TOBACCO NON-USER: CPT | Performed by: NURSE PRACTITIONER

## 2020-12-14 PROCEDURE — 3017F COLORECTAL CA SCREEN DOC REV: CPT | Performed by: NURSE PRACTITIONER

## 2020-12-14 PROCEDURE — 99213 OFFICE O/P EST LOW 20 MIN: CPT | Performed by: NURSE PRACTITIONER

## 2020-12-14 PROCEDURE — G8417 CALC BMI ABV UP PARAM F/U: HCPCS | Performed by: NURSE PRACTITIONER

## 2020-12-14 PROCEDURE — G8484 FLU IMMUNIZE NO ADMIN: HCPCS | Performed by: NURSE PRACTITIONER

## 2020-12-14 PROCEDURE — G8427 DOCREV CUR MEDS BY ELIG CLIN: HCPCS | Performed by: NURSE PRACTITIONER

## 2020-12-14 RX ORDER — LEVOCETIRIZINE DIHYDROCHLORIDE 5 MG/1
TABLET, FILM COATED ORAL
COMMUNITY
Start: 2020-12-07 | End: 2022-03-23

## 2020-12-14 NOTE — PATIENT INSTRUCTIONS
Patient Education        Learning About CPAP for Sleep Apnea  What is CPAP? CPAP is a small machine that you use at home every night while you sleep. It increases air pressure in your throat to keep your airway open. When you have sleep apnea, this can help you sleep better so you feel much better. CPAP stands for \"continuous positive airway pressure. \"  The CPAP machine will have one of the following:  · A mask that covers your nose and mouth  · Prongs that fit into your nose  · A mask that covers your nose only, which is the most common type. This type is called NCPAP. The N stands for \"nasal.\"  Why is it done? CPAP is usually the best treatment for obstructive sleep apnea. It is the first treatment choice and the most widely used. CPAP:  · Helps you have more normal sleep, so you feel less sleepy and more alert during the daytime. · May help keep heart failure or other heart problems from getting worse. · May help lower your blood pressure. If you use CPAP, your bed partner may also sleep better. That's because you aren't snoring or restless. Your doctor may suggest CPAP if you have:  · Moderate to severe sleep apnea. · Sleep apnea and coronary artery disease (CAD). · Sleep apnea and heart failure. What are the side effects? Some people who use CPAP have:  · A dry or stuffy nose and a sore throat. · Irritated skin on the face. · Sore eyes. · Bloating. How can you care for yourself? If using CPAP is not comfortable, or if you have certain side effects, work with your doctor to fix them. Here are some things you can try:  · Be sure the mask or nasal prongs fit well. · See if your doctor can adjust the pressure of your CPAP. · If your nose is dry, try a humidifier. · If your nose is runny or stuffy, try decongestant medicine or a steroid nasal spray. Be safe with medicines. Read and follow all instructions on the label. Do not use the medicine longer than the label says.   If these things don't help, you might try a different type of machine. Some machines have air pressure that adjusts on its own. Others have air pressures that are different when you breathe in than when you breathe out. This may reduce discomfort caused by too much pressure in your nose. Where can you learn more? Go to https://chpepiceweb.[email or web address] and sign in to your [employer] account. Enter S173 in the Book&Table box to learn more about \"Learning About CPAP for Sleep Apnea. \"     If you do not have an account, please click on the \"Sign Up Now\" link. Current as of: February 24, 2020               Content Version: 12.6  © 2006-2020 [employer]. Care instructions adapted under license by [facility] ([facility]). If you have questions about a medical condition or this instruction, always ask your healthcare professional. [place] [address] any warranty or liability for your use of this information. Patient Education        Sleep Apnea: Care Instructions  Your Care Instructions     Sleep apnea means that you frequently stop breathing for 10 seconds or longer during sleep. It can be mild to severe, based on the number of times an hour that you stop breathing or have slowed breathing. Blocked or narrowed airways in your nose, mouth, or throat can cause sleep apnea. Your airway can become blocked when your throat muscles and tongue relax during sleep. You can treat sleep apnea at home by making lifestyle changes. You also can use a CPAP breathing machine that keeps tissues in the throat from blocking your airway. Or your doctor may suggest that you use a breathing device while you sleep. It helps keep your airway open. This could be a device that you put in your mouth. In some cases, surgery may be needed to remove enlarged tissues in the throat. Follow-up care is a key part of your treatment and safety.  Be sure to make and go to all appointments, and call your doctor if you are having problems. It's also a good idea to know your test results and keep a list of the medicines you take. How can you care for yourself at home? · Lose weight, if needed. It may reduce the number of times you stop breathing or have slowed breathing. · Sleep on your side. It may stop mild apnea. If you tend to roll onto your back, sew a pocket in the back of your pajama top. Put a tennis ball into the pocket, and stitch the pocket shut. This will help keep you from sleeping on your back. · Avoid alcohol and medicines such as sleeping pills and sedatives before bed. · Do not smoke. Smoking can make sleep apnea worse. If you need help quitting, talk to your doctor about stop-smoking programs and medicines. These can increase your chances of quitting for good. · Prop up the head of your bed 4 to 6 inches by putting bricks under the legs of the bed. · Treat breathing problems, such as a stuffy nose, caused by a cold or allergies. · Try a continuous positive airway pressure (CPAP) breathing machine if your doctor recommends it. The machine keeps your airway open when you sleep. · If CPAP does not work for you, ask your doctor if you can try other breathing machines. A bilevel positive airway pressure machine uses one type of air pressure for breathing in and another type for breathing out. Another device raises or lowers air pressure as needed while you breathe. · Talk to your doctor if:  ? Your nose feels dry or bleeds when you use one of these machines. You may need to increase moisture in the air. A humidifier may help. ? Your nose is runny or stuffy from using a breathing machine. Decongestants or a corticosteroid nasal spray may help. ? You are sleepy during the day and it gets in the way of the normal things you do. Do not drive when you are drowsy. When should you call for help?   Watch closely for changes in your health, and be sure to contact your doctor if:    · You still have sleep apnea even though you have made lifestyle changes.     · You are thinking of trying a device such as CPAP.     · You are having problems using a CPAP or similar machine. Where can you learn more? Go to https://SeniorCare.ReDigi. org and sign in to your [employer] account. Enter S266 in the [facility] box to learn more about \"Sleep Apnea: Care Instructions. \"     If you do not have an account, please click on the \"Sign Up Now\" link. Current as of: February 24, 2020               Content Version: 12.6  © 8322-9426 [employer], Incorporated. Care instructions adapted under license by [facility] ([facility]). If you have questions about a medical condition or this instruction, always ask your healthcare professional. [address] any warranty or liability for your use of this information.

## 2020-12-14 NOTE — PROGRESS NOTES
[facility] Sleep Follow Up Encounter      Information:   Patient Name: Tiesha Sahu  :   1960  Age:   63 y.o. MRN:   [id number]  Account #:  [de-identified]  Today:                20    Provider:  JULIAN Azevedo    Chief Complaint   Patient presents with    3 Month Follow-Up    Sleep Apnea     states this are \"pretty good\"        Subjective: Tiesha Sahu is a 63 y.o. female with a history of mild GUILLERMO, headaches, carpal tunnel syndrome, memory loss, dizziness who comes in for a sleep clinic follow up. The compliance report indicates that she  is averaging  4 hours of CPAP use per day. She  averages 5-6 hours of sleep per night. She reports that consistent CPAP use has alleviated the previous GUILLERMO symptoms.     Location or symptom: GUILLERMO  Onset: 1 year ago   Timing:  q hs  Severity: 2 night HST 2020 with AHI 6.7 and 7.2   Associated:  Snoring, witnessed apneas, and excessive daytime somnolence  Alleviated:  CPAP      Objective:     Past Medical History:   Diagnosis Date    Anxiety     Chronic back pain     GERD (gastroesophageal reflux disease)     Heart valve disorder     Hypertension     [facility]coma [facility] [address]     Type 2 diabetes mellitus without complication ([facility] [address]        Past Surgical History:   Procedure Laterality Date    JOINT REPLACEMENT Bilateral     KNEE SURGERY Bilateral     knee replacement    ROTATOR CUFF REPAIR Right        Recent Hospitalizations  ·     Significant Injuries  ·     Family History   Problem Relation Age of Onset    Cancer Mother     Cancer Father         Prostate    Heart Disease Father     Diabetes Maternal Grandmother     Heart Disease Maternal Grandmother        Social History  Social History     Tobacco Use   Smoking Status Never Smoker   Smokeless Tobacco Never Used     Social History     Substance and Sexual Activity   Alcohol Use Yes    Comment: occ     Social History     Substance and Sexual Activity   Drug Use No         Current Outpatient Medications Medication Sig Dispense Refill    Cyanocobalamin (VITAMIN B 12 PO) Take by mouth daily      atenolol (TENORMIN) 50 MG tablet Take 1 tablet by mouth daily      D3-50 16330 units CAPS Take 1 capsule by mouth once a week      methocarbamol (ROBAXIN) 500 MG tablet Take 500 mg by mouth 3 times daily      butalbital-apap-caffeine -40 MG CAPS Take 1 capsule by mouth every 8 hours as needed for Headaches 30 capsule 1    potassium chloride (KLOR-CON M) 20 MEQ extended release tablet Take 20 mEq by mouth daily      cetirizine (ZYRTEC) 10 MG tablet Take 10 mg by mouth daily      montelukast (SINGULAIR) 10 MG tablet Take 1 tablet by mouth daily      HYDROcodone-acetaminophen (NORCO) 7.5-325 MG per tablet Take 1 tablet by mouth 2 times daily .  albuterol sulfate  (90 Base) MCG/ACT inhaler Inhale into the lungs daily as needed       aspirin 81 MG EC tablet Take 81 mg by mouth      furosemide (LASIX) 40 MG tablet Take 40 mg by mouth as needed       cloNIDine (CATAPRES) 0.1 MG tablet Take 0.1 mg by mouth as needed       lisinopril (PRINIVIL;ZESTRIL) 40 MG tablet 40 mg daily       metFORMIN (GLUCOPHAGE) 500 MG tablet Take 500 mg by mouth as needed       amLODIPine (NORVASC) 5 MG tablet Take 5 mg by mouth daily       levocetirizine (XYZAL) 5 MG tablet        No current facility-administered medications for this visit. Allergies:  Cefdinir and Levofloxacin    Review of Systems:   Constitutional: []? Fever []? Sweats []? Chills []? Recent Injury [x]? Denies all unless marked  HEENT:[]? Headache  []? Head Injury []? Hearing Loss  [x]? Sore Throat  []? Ear Ache []? Denies all unless marked  Spine:  []? Neck pain  []? Back pain  []? Sciaticia  [x]? Denies all unless marked  Cardiovascular:[]? Heart Disease []? Palpitations []? Chest Pain   [x]? Denies all unless marked  Pulmonary: []? Shortness of Breath [x]? Cough   []? Denies all unless marked  Psychiatric/Behavioral:[]? Depression []? Anxiety [x]? summarization of medical records and/or obtain medical records     [x]  :  Previous/recent polysomnogram report(s)    []  :  [place] Sleepiness Scale      [x]  :  Compliance download:  She has a auto CPAP set at a pressure 10-20cm H20. Compliance download shows that she uses device:  70% of the time;  percentage of days with usage >=4 hours:  47%. AHI:  0.3    Assessment:       ICD-10-CM    1. GUILLERMO (obstructive sleep apnea)  G47.33    2. CPAP (continuous positive airway pressure) dependence  Z99.89         []  :  Stable     []  :  Improved                       []  :  Well controlled              [x]  :  Resolving     []  :  Resolved     []  :  Inadequately controlled     []  :  Worsening     []  :  Additional workup planned      Plan:     No orders of the defined types were placed in this encounter. No orders of the defined types were placed in this encounter. 1.   Patient advised of the etiology,  pathophysiology, diagnosis, treatment options, and risks of untreated GUILLERMO. Risks may include, but are not limited to  hypertension, coronary artery disease, diabetes, stroke, weight gain, impaired cognition, daytime somnolence,  and motor vehicle accidents. Advised to abstain from driving or operating heavy machinery when drowsy and the use of respiratory suppressants. 2.  The following educational material has been included in this visit after visit summary for your review: GUILLERMO/PAP guidelines-Discussed with the patient and all questions fully answered. 3. Discussed compliance, strategies/importance of increasing CPAP use   4. Return in about 3 months (around 3/14/2021) for follow up, sooner if worsening, Sleep patient follow up. Libertad Galicia DNP, APRN     Note:  A total of >50% (>8 minutes) of 15 minutes was spent discussing the pathophysiology and treatment and/or coordination of care of the above diagnoses.

## 2020-12-14 NOTE — PROGRESS NOTES
REVIEW OF SYSTEMS    Constitutional: []Fever []Sweats []Chills [] Recent Injury [x] Denies all unless marked  HEENT:[]Headache  [] Head Injury [] Hearing Loss  [x] Sore Throat  [] Ear Ache [] Denies all unless marked  Spine:  [] Neck pain  [] Back pain  [] Sciaticia  [x] Denies all unless marked  Cardiovascular:[]Heart Disease []Palpitations [] Chest Pain   [x] Denies all unless marked  Pulmonary: []Shortness of Breath [x]Cough   [] Denies all unless marked  Psychiatric/Behavioral:[] Depression [] Anxiety [x] Denies all unless marked  Gastrointestinal: []Nausea  []Vomiting  []Abdominal Pain  []Constipation  []Diarrhea  [x] Denies all unless marked  Genitourinary:   [] Frequency  [] Urgency  [] Dysuria [] Incontinence  [x] Denies all unless marked  Extremities: []Pain  []Swelling  [x] Denies all unless marked  Musculoskeletal: [] Myalgias  [] Joint Pain  [] Arthritis [] Muscle Cramps [] Muscle Twitches  [x] Denies all unless marked  Sleep: []Insomnia[]Snoring []Restless Legs  [x]Sleep Apnea  []Daytime Sleepiness  [x] Denies all unless marked  Skin:[] Rash [] Color Change [x] Denies all unless marked   Neurological:[]Visual Disturbance [] Memory Loss []Loss of Balance []Slurred Speech []Weakness []Seizures  [] Dizziness [x] Denies all unless marked

## 2021-03-15 ENCOUNTER — OFFICE VISIT (OUTPATIENT)
Dept: NEUROSURGERY | Age: 61
End: 2021-03-15
Payer: MEDICAID

## 2021-03-15 VITALS
SYSTOLIC BLOOD PRESSURE: 122 MMHG | DIASTOLIC BLOOD PRESSURE: 74 MMHG | BODY MASS INDEX: 51.91 KG/M2 | TEMPERATURE: 97 F | HEART RATE: 56 BPM | WEIGHT: 293 LBS | OXYGEN SATURATION: 93 % | HEIGHT: 63 IN

## 2021-03-15 DIAGNOSIS — G47.33 OSA (OBSTRUCTIVE SLEEP APNEA): Primary | ICD-10-CM

## 2021-03-15 DIAGNOSIS — Z99.89 CPAP (CONTINUOUS POSITIVE AIRWAY PRESSURE) DEPENDENCE: ICD-10-CM

## 2021-03-15 PROCEDURE — 3017F COLORECTAL CA SCREEN DOC REV: CPT | Performed by: NURSE PRACTITIONER

## 2021-03-15 PROCEDURE — G8484 FLU IMMUNIZE NO ADMIN: HCPCS | Performed by: NURSE PRACTITIONER

## 2021-03-15 PROCEDURE — G8417 CALC BMI ABV UP PARAM F/U: HCPCS | Performed by: NURSE PRACTITIONER

## 2021-03-15 PROCEDURE — G8427 DOCREV CUR MEDS BY ELIG CLIN: HCPCS | Performed by: NURSE PRACTITIONER

## 2021-03-15 PROCEDURE — 99213 OFFICE O/P EST LOW 20 MIN: CPT | Performed by: NURSE PRACTITIONER

## 2021-03-15 PROCEDURE — 1036F TOBACCO NON-USER: CPT | Performed by: NURSE PRACTITIONER

## 2021-03-15 NOTE — PROGRESS NOTES
REVIEW OF SYSTEMS    Constitutional: []Fever []Sweats []Chills [] Recent Injury [x] Denies all unless marked  HEENT:[]Headache  [] Head Injury [] Hearing Loss  [] Sore Throat  [] Ear Ache [x] Denies all unless marked  Spine:  [] Neck pain  [] Back pain  [] Sciaticia  [x] Denies all unless marked  Cardiovascular:[]Heart Disease []Palpitations [] Chest Pain   [x] Denies all unless marked  Pulmonary: []Shortness of Breath []Cough   [x] Denies all unless marked  Psychiatric/Behavioral:[] Depression [] Anxiety [x] Denies all unless marked  Gastrointestinal: []Nausea  []Vomiting  []Abdominal Pain  []Constipation  []Diarrhea  [x] Denies all unless marked  Genitourinary:   [] Frequency  [] Urgency  [] Dysuria [] Incontinence  [x] Denies all unless marked  Extremities: []Pain  []Swelling  [x] Denies all unless marked  Musculoskeletal: [] Myalgias  [] Joint Pain  [] Arthritis [] Muscle Cramps [] Muscle Twitches  [x] Denies all unless marked  Sleep: []Insomnia[]Snoring []Restless Legs  [x]Sleep Apnea  []Daytime Sleepiness  [] Denies all unless marked  Skin:[] Rash [] Color Change [x] Denies all unless marked   Neurological:[]Visual Disturbance [] Memory Loss []Loss of Balance []Slurred Speech []Weakness []Seizures  [] Dizziness [x] Denies all unless marked

## 2021-03-15 NOTE — PROGRESS NOTES
[facility] Sleep Follow Up Encounter      Information:   Patient Name: Sherif Don  :   1960  Age:   63 y.o. MRN:   [id number]  Account #:  [de-identified]  Today:                3/15/21    Provider:  JULIAN Hernandez    Chief Complaint   Patient presents with    3 Month Follow-Up     pt states things are going okay    Sleep Apnea     needs new supplies        Subjective: Sherif Don is a 63 y.o. female with a history of mild GUILLERMO, headaches, carpal tunnel syndrome, memory loss, dizziness who comes in for a sleep clinic follow up. The compliance report indicates that she  is averaging 4 hours of CPAP use per day. She averages 5-6 hours of sleep per night. She reports that consistent CPAP use has alleviated the previous GUILELRMO symptoms.      Location or symptom: GUILLERMO  Onset: 1 year ago   Timing:  q hs  Severity: 2 night HST 2020 with AHI 6.7 and 7.2   Associated:  Snoring, witnessed apneas, and excessive daytime somnolence  Alleviated:  CPAP    Objective:     Past Medical History:   Diagnosis Date    Anxiety     Chronic back pain     GERD (gastroesophageal reflux disease)     Heart valve disorder     Hypertension     Sarcoma of breast ([address]     Type 2 diabetes mellitus without complication ([facility] [address]        Past Surgical History:   Procedure Laterality Date    JOINT REPLACEMENT Bilateral     KNEE SURGERY Bilateral     knee replacement    ROTATOR CUFF REPAIR Right        Recent Hospitalizations  ·     Significant Injuries  ·     Family History   Problem Relation Age of Onset    Cancer Mother     Cancer Father         Prostate    Heart Disease Father     Diabetes Maternal Grandmother     Heart Disease Maternal Grandmother        Social History  Social History     Tobacco Use   Smoking Status Never Smoker   Smokeless Tobacco Never Used     Social History     Substance and Sexual Activity   Alcohol Use Yes    Comment: occ     Social History     Substance and Sexual Activity   Drug Use No         Current Outpatient Medications   Medication Sig Dispense Refill    levocetirizine (XYZAL) 5 MG tablet       Cyanocobalamin (VITAMIN B 12 PO) Take by mouth daily      atenolol (TENORMIN) 50 MG tablet Take 1 tablet by mouth daily      D3-50 65685 units CAPS Take 1 capsule by mouth once a week      methocarbamol (ROBAXIN) 500 MG tablet Take 500 mg by mouth 3 times daily      butalbital-apap-caffeine -40 MG CAPS Take 1 capsule by mouth every 8 hours as needed for Headaches 30 capsule 1    potassium chloride (KLOR-CON M) 20 MEQ extended release tablet Take 20 mEq by mouth daily      cetirizine (ZYRTEC) 10 MG tablet Take 10 mg by mouth daily      montelukast (SINGULAIR) 10 MG tablet Take 1 tablet by mouth daily      HYDROcodone-acetaminophen (NORCO) 7.5-325 MG per tablet Take 1 tablet by mouth 2 times daily .  albuterol sulfate  (90 Base) MCG/ACT inhaler Inhale into the lungs daily as needed       aspirin 81 MG EC tablet Take 81 mg by mouth      furosemide (LASIX) 40 MG tablet Take 40 mg by mouth as needed       cloNIDine (CATAPRES) 0.1 MG tablet Take 0.1 mg by mouth as needed       lisinopril (PRINIVIL;ZESTRIL) 40 MG tablet 40 mg daily       metFORMIN (GLUCOPHAGE) 500 MG tablet Take 500 mg by mouth as needed       amLODIPine (NORVASC) 5 MG tablet Take 5 mg by mouth daily        No current facility-administered medications for this visit. Allergies:  Cefdinir and Levofloxacin    Review of Systems:   Constitutional: []? Fever []? Sweats []? Chills []? Recent Injury [x]? Denies all unless marked  HEENT:[]? Headache  []? Head Injury []? Hearing Loss  []? Sore Throat  []? Ear Ache [x]? Denies all unless marked  Spine:  []? Neck pain  []? Back pain  []? Sciaticia  [x]? Denies all unless marked  Cardiovascular:[]? Heart Disease []? Palpitations []? Chest Pain   [x]? Denies all unless marked  Pulmonary: []? Shortness of Breath []? Cough   [x]? Denies all unless marked  Psychiatric/Behavioral:[]? Depression []? Anxiety [x]? Denies all unless marked  Gastrointestinal: []? Nausea  []? Vomiting  []? Abdominal Pain  []? Constipation  []? Diarrhea  [x]? Denies all unless marked  Genitourinary:   []? Frequency  []? Urgency  []? Dysuria []? Incontinence  [x]? Denies all unless marked  Extremities: []? Pain  []? Swelling  [x]? Denies all unless marked  Musculoskeletal: []? Myalgias  []? Joint Pain  []? Arthritis []? Muscle Cramps []? Muscle Twitches  [x]? Denies all unless marked  Sleep: []? Insomnia[]? Snoring []? Restless Legs  [x]? Sleep Apnea  []? Daytime Sleepiness  []? Denies all unless marked  Skin:[]? Rash []? Color Change [x]? Denies all unless marked   Neurological:[]? Visual Disturbance []? Memory Loss []? Loss of Balance []? Slurred Speech []? Weakness []? Seizures  []? Dizziness [x]? Denies all unless marked    The MA has completed the ROS with the patient. I have reviewed it in its' entirety with the patient and agree with the documentation. Examination:  Vitals:  /74   Pulse 56   Temp 97 °F (36.1 °C)   Ht 5' 3\" (1.6 m)   Wt 293 lb (132.9 kg)   SpO2 93%   Breastfeeding No   BMI 51.90 kg/m²   General appearance:  alert and cooperative with exam  HEENT:  PERRLA, EOMI, Sclera clear, anicteric, Oropharynx clear, no lesions, Neck supple with midline trachea and Trachea midline  Heart[de-identified]  regular rate and rhythm  Lungs:  clear to auscultation bilaterally  Extremities:  extremities normal, atraumatic, no cyanosis or edema  Neurologic:  Extraocular movements are intact without nystagmus. Visual fields are full to confrontation. Facial movements are symmetrical and normal.  Speech is precise. Extremity strength is normal in both uppers and lowers. Deep tendon reflexes are intact and symmetrical.  Rapid alternating movements are unimpaired. Finger-to-nose testing is performed well, without dysmetria.   Gait is normal.    [place] Sleepiness Scale    0 = would never doze  1 = slight chance of dozing  2 = moderate chance of dozing  3 = high chance of dozing    Situation Chance of Dozing (0-3)    Sitting and reading       2    Watching TV        0    Sitting, inactive in a public place (e.g. a theatre or a meeting) 0    As a passenger in a car for an hour without a break   1    Lying down to rest in the afternoon when circumstances permit 2    Sitting and talking to someone     0    Sitting quietly after a lunch without alcohol    2    In a car, while stopped for a few minutes in the traffic  0    Total         7      I reviewed the following studies:    []  :  Clinical laboratory test results    []  :  Radiology reports    []  :  Review and summarization of medical records and/or obtain medical records     []  :  Previous/recent polysomnogram report(s)    [x]  :  [place] Sleepiness Scale    [x]  :  Compliance download:  She has an auto CPAP set at a pressure 10-20cm H20. Compliance download shows that she uses device: 80% of the time;  percentage of days with usage >=4 hours: 43%. AHI:  0.9    Assessment:       ICD-10-CM    1. GUILLERMO (obstructive sleep apnea)  G47.33    2. CPAP (continuous positive airway pressure) dependence  Z99.89           []  :  Stable     []  :  Improved                       []  :  Well controlled              [x]  :  Resolving     []  :  Resolved     []  :  Inadequately controlled     []  :  Worsening     []  :  Additional workup planned      Plan:     No orders of the defined types were placed in this encounter. No orders of the defined types were placed in this encounter. 1.   Patient advised of the etiology,  pathophysiology, diagnosis, treatment options, and risks of untreated GUILLERMO. Risks may include, but are not limited to  hypertension, coronary artery disease, diabetes, stroke, weight gain, impaired cognition, daytime somnolence,  and motor vehicle accidents. Advised to abstain from driving or operating heavy machinery when drowsy and the use of respiratory suppressants.   2.  The following educational material has been included in this visit after visit summary for your review: GUILLERMO/PAP guidelines-Discussed with the patient and all questions fully answered. 3.  The current medical regimen is effective;  continue present plan. 4.  Return in about 3 months (around 6/15/2021) for follow up, sooner if worsening, Sleep patient follow up.       Renny Arora DNP, APRN

## 2021-06-15 ENCOUNTER — TELEPHONE (OUTPATIENT)
Dept: NEUROSURGERY | Age: 61
End: 2021-06-15

## 2021-08-03 ENCOUNTER — TELEPHONE (OUTPATIENT)
Dept: NEUROSURGERY | Age: 61
End: 2021-08-03

## 2021-08-03 NOTE — TELEPHONE ENCOUNTER
Patient came into office for scheduled 0915 appointment at [phone]. Pt states she called preservice 2 times however only once was it mentioned in a teams message, preservice advised patient she could come into office. Teams message from [facility] at [address] [facility] stated pt is in parking lot for appointment, [id number] I replied please reschedule patient. I spoke with EG, she advised that due to it being out of the 15 minute window of appointment time and other patients waiting. Ivonne Viveros tried to explain this to patient who became annoyed raising her voice. Pt stated \"you all dont care about my health, I was in the parking lot at 920. \" At this point I walked up to the window introduced myself and explained that we have a 15 minute policy to keep the flow going and that if we saw patients that were late we was delaying care of the other patients. Patient is still visibly upset requesting to speak with manager, I advised pt I would send Manager a message for her to call when she was back in office. Patient is not happy with these answers, mumbling under her breath, \"asking if we were going to pay for her gas since she lives in [place] and [place] care for her health. \" I again assured patient we were very concerned about her health, however this was policy and after speaking with Marleny Acosta she would have to be rescheduled. NADYA pradhan give patient appointment at this time and patient left clinic visibly upset.

## 2021-08-10 ENCOUNTER — OFFICE VISIT (OUTPATIENT)
Dept: SURGERY | Age: 61
End: 2021-08-10
Payer: MEDICAID

## 2021-08-10 ENCOUNTER — HOSPITAL ENCOUNTER (OUTPATIENT)
Dept: WOMENS IMAGING | Age: 61
Discharge: HOME | End: 2021-08-10
Payer: MEDICAID

## 2021-08-10 VITALS
BODY MASS INDEX: 51.74 KG/M2 | TEMPERATURE: 96.8 F | WEIGHT: 292 LBS | HEIGHT: 63 IN | DIASTOLIC BLOOD PRESSURE: 86 MMHG | HEART RATE: 58 BPM | SYSTOLIC BLOOD PRESSURE: 129 MMHG

## 2021-08-10 DIAGNOSIS — Z12.31 VISIT FOR SCREENING MAMMOGRAM: Primary | ICD-10-CM

## 2021-08-10 DIAGNOSIS — Z12.31 VISIT FOR SCREENING MAMMOGRAM: ICD-10-CM

## 2021-08-10 PROCEDURE — 99213 OFFICE O/P EST LOW 20 MIN: CPT | Performed by: PHYSICIAN ASSISTANT

## 2021-08-10 PROCEDURE — G8427 DOCREV CUR MEDS BY ELIG CLIN: HCPCS | Performed by: PHYSICIAN ASSISTANT

## 2021-08-10 PROCEDURE — 77063 BREAST TOMOSYNTHESIS BI: CPT

## 2021-08-10 PROCEDURE — 1036F TOBACCO NON-USER: CPT | Performed by: PHYSICIAN ASSISTANT

## 2021-08-10 PROCEDURE — G8417 CALC BMI ABV UP PARAM F/U: HCPCS | Performed by: PHYSICIAN ASSISTANT

## 2021-08-10 PROCEDURE — 3017F COLORECTAL CA SCREEN DOC REV: CPT | Performed by: PHYSICIAN ASSISTANT

## 2021-08-10 NOTE — TELEPHONE ENCOUNTER
Called patient at [phone] and no answer- message left for a return call to my office at [phone].  sh

## 2021-08-10 NOTE — TELEPHONE ENCOUNTER
Patient called back and stated that she was late to her appointment with Kan Mae on 08/03/2021 but that she had called the office and spoke to two different people two different times and that they both told her to come on in. Patient states that she is disabled and it is hard for her to get in the building in a timely manner and takes at least 5 mins to find a parking spot. I apologized to patient about the two people on the phone not telling her our 23AIJ late policy and that I understood that she was disabled but to try to leave earlier from home to make it in enough time so she does not have to rush. Stated to patient for understanding that our appointment times are 15mins long and after the patient is over 15mins late the appointment time has already passed and the provider is already seeing another patient. Patient voiced understanding. I apologized again for the lack of communication on our part for not communicating our 82QPR late policy. Stated to patient that she has an appointment on 8/26 with Kiana Mullins and to try to be on time. Patient voiced understanding and thanked me for the call.  [place]

## 2021-11-24 ENCOUNTER — TELEPHONE (OUTPATIENT)
Dept: NEUROSURGERY | Age: 61
End: 2021-11-24

## 2021-12-22 ENCOUNTER — TELEPHONE (OUTPATIENT)
Dept: NEUROSURGERY | Age: 61
End: 2021-12-22

## 2021-12-22 NOTE — TELEPHONE ENCOUNTER
Called pt to remind her of appointment today. also informed her of late policy. Pt voiced she would be here.

## 2021-12-22 NOTE — TELEPHONE ENCOUNTER
Patient came into office at 10:54am for her 10:30am appointment and I was asked to speak to the patient by Miriam Foreman states that she already called the patient this morning and reminded the patient of the appointment time and that our office had a 94OCU late policy. Pulled patient back into the room and stated that patient was late for her appointment and that we called and reminded the patient of her appointment at 0830 this morning and stated the time along with at 11LZP late policy. Patient states that she was here at 10:45am and called to let the office know that she was running late. Explained to the patient that she was late for her appointment and that she could reschedule or we have a opening at 2pm and could be seen then. Patient states that her health conditions cause her to be late as well as getting through the traffic in [address] [place] [address]. Explained to patient that she needs to be on time for appointments and needs to leave early in order to get to her appointments on time. Patient states that she will take the 2pm appointment with JULIAN Galvez Cera. I stated to patient that if she was late then she would be rescheduled for her visit. Stated that twice. Patient voiced understanding.  Appointment moved to 2pm. sh

## 2022-02-04 ENCOUNTER — NURSE TRIAGE (OUTPATIENT)
Dept: CALL CENTER | Facility: HOSPITAL | Age: 62
End: 2022-02-04

## 2022-02-05 NOTE — TELEPHONE ENCOUNTER
Caller states cough clear and yellow. Caller states some chills and temperature 100. Caller states taking ASA and was advised switch to tylenol if no liver health history. Caller states cough mild. Caller denies any Chest Pain or Dyspnea. Caller educated on what to monitor for. Caller states b/p 179/93 and states took her medication about hour ago. Caller reports has increased her liquid intake and having frequent urination. Caller denies any burning with urination or flank pain. Caller advised to recheck b/p and make sure down since having taken her medication. Caller advised to check glucose as she has not done so tonight and make sure not elevated. Caller advised urgent care can covid test in the morning. Caller advised to call back as needed. Caller advised if not better with care advice call PCP office Monday morning. Caller advised seek emergent care tonight if becomes worse.         Reason for Disposition  • Cough    Additional Information  • Negative: SEVERE difficulty breathing (e.g., struggling for each breath, speaks in single words)  • Negative: Bluish (or gray) lips or face now  • Negative: [1] Difficulty breathing AND [2] exposure to flames, smoke, or fumes  • Negative: [1] Stridor AND [2] difficulty breathing  • Negative: Sounds like a life-threatening emergency to the triager  • Negative: [1] Previous asthma attacks AND [2] this feels like asthma attack  • Negative: Dry cough (non-productive;  no sputum or minimal clear sputum)  • Negative: [1] MODERATE difficulty breathing (e.g., speaks in phrases, SOB even at rest, pulse 100-120) AND [2] still present when not coughing  • Negative: Chest pain  (Exception: MILD central chest pain, present only when coughing)  • Negative: Patient sounds very sick or weak to the triager  • Negative: [1] MILD difficulty breathing (e.g., minimal/no SOB at rest, SOB with walking, pulse <100) AND [2] still present when not coughing  • Negative: [1] Coughed up blood AND  "[2] > 1 tablespoon (15 ml) (Exception: blood-tinged sputum)  • Negative: Fever > 103 F (39.4 C)  • Negative: [1] Fever > 101 F (38.3 C) AND [2] age > 60 years  • Negative: [1] Fever > 100.0 F (37.8 C) AND [2] bedridden (e.g., nursing home patient, CVA, chronic illness, recovering from surgery)  • Negative: [1] Fever > 100.0 F (37.8 C) AND [2] diabetes mellitus or weak immune system (e.g., HIV positive, cancer chemo, splenectomy, organ transplant, chronic steroids)  • Negative: Wheezing is present  • Negative: SEVERE coughing spells (e.g., whooping sound after coughing, vomiting after coughing)  • Negative: [1] Continuous (nonstop) coughing interferes with work or school AND [2] no improvement using cough treatment per Care Advice  • Negative: Coughing up mathew-colored (reddish-brown) sputum  • Negative: Fever present > 3 days (72 hours)  • Negative: [1] Fever returns after gone for over 24 hours AND [2] symptoms worse or not improved  • Negative: [1] Using nasal washes and pain medicine > 24 hours AND [2] sinus pain (around cheekbone or eye) persists  • Negative: Earache  • Negative: [1] Known COPD or other severe lung disease (i.e., bronchiectasis, cystic fibrosis, lung surgery) AND [2] worsening symptoms (i.e., increased sputum purulence or amount, increased breathing difficulty  • Negative: Cough has been present for > 3 weeks  • Negative: [1] Nasal discharge AND [2] present > 10 days  • Negative: [1] Coughed up blood-tinged sputum AND [2] more than once  • Negative: Exposure to TB (Tuberculosis)    Answer Assessment - Initial Assessment Questions  1. ONSET: \"When did the cough begin?\"       Yesterday dry cough   2. SEVERITY: \"How bad is the cough today?\"        Mild   3. SPUTUM: \"Describe the color of your sputum\" (none, dry cough; clear, white, yellow, green)      Clear to yellow   4. HEMOPTYSIS: \"Are you coughing up any blood?\" If so ask: \"How much?\" (flecks, streaks, tablespoons, etc.)      Denies   5. " "DIFFICULTY BREATHING: \"Are you having difficulty breathing?\" If Yes, ask: \"How bad is it?\" (e.g., mild, moderate, severe)     - MILD: No SOB at rest, mild SOB with walking, speaks normally in sentences, can lay down, no retractions, pulse < 100.     - MODERATE: SOB at rest, SOB with minimal exertion and prefers to sit, cannot lie down flat, speaks in phrases, mild retractions, audible wheezing, pulse 100-120.     - SEVERE: Very SOB at rest, speaks in single words, struggling to breathe, sitting hunched forward, retractions, pulse > 120       Denies dyspnea   6. FEVER: \"Do you have a fever?\" If Yes, ask: \"What is your temperature, how was it measured, and when did it start?\"      Highest temperature states 100.0  7. CARDIAC HISTORY: \"Do you have any history of heart disease?\" (e.g., heart attack, congestive heart failure)       Blockage   8. LUNG HISTORY: \"Do you have any history of lung disease?\"  (e.g., pulmonary embolus, asthma, emphysema)      Asthma   9. PE RISK FACTORS: \"Do you have a history of blood clots?\" (or: recent major surgery, recent prolonged travel, bedridden)      Denies   10. OTHER SYMPTOMS: \"Do you have any other symptoms?\" (e.g., runny nose, wheezing, chest pain)        Denies Chest Pain or Dyspnea. No runny nose   11. PREGNANCY: \"Is there any chance you are pregnant?\" \"When was your last menstrual period?\"          12. TRAVEL: \"Have you traveled out of the country in the last month?\" (e.g., travel history, exposures)    Protocols used: COUGH - ACUTE PRODUCTIVE-ADULT-AH      "

## 2022-03-23 ENCOUNTER — OFFICE VISIT (OUTPATIENT)
Dept: NEUROSURGERY | Age: 62
End: 2022-03-23
Payer: MEDICAID

## 2022-03-23 VITALS
WEIGHT: 291 LBS | BODY MASS INDEX: 51.56 KG/M2 | SYSTOLIC BLOOD PRESSURE: 145 MMHG | DIASTOLIC BLOOD PRESSURE: 85 MMHG | HEIGHT: 63 IN | HEART RATE: 70 BPM

## 2022-03-23 DIAGNOSIS — M25.532 LEFT WRIST PAIN: ICD-10-CM

## 2022-03-23 DIAGNOSIS — G47.33 OSA (OBSTRUCTIVE SLEEP APNEA): Primary | ICD-10-CM

## 2022-03-23 DIAGNOSIS — R51.9 NONINTRACTABLE HEADACHE, UNSPECIFIED CHRONICITY PATTERN, UNSPECIFIED HEADACHE TYPE: ICD-10-CM

## 2022-03-23 DIAGNOSIS — G56.01 CARPAL TUNNEL SYNDROME ON RIGHT: ICD-10-CM

## 2022-03-23 PROCEDURE — G8427 DOCREV CUR MEDS BY ELIG CLIN: HCPCS | Performed by: NURSE PRACTITIONER

## 2022-03-23 PROCEDURE — 99213 OFFICE O/P EST LOW 20 MIN: CPT | Performed by: NURSE PRACTITIONER

## 2022-03-23 PROCEDURE — G8417 CALC BMI ABV UP PARAM F/U: HCPCS | Performed by: NURSE PRACTITIONER

## 2022-03-23 PROCEDURE — 3017F COLORECTAL CA SCREEN DOC REV: CPT | Performed by: NURSE PRACTITIONER

## 2022-03-23 PROCEDURE — G8484 FLU IMMUNIZE NO ADMIN: HCPCS | Performed by: NURSE PRACTITIONER

## 2022-03-23 PROCEDURE — 1036F TOBACCO NON-USER: CPT | Performed by: NURSE PRACTITIONER

## 2022-03-23 RX ORDER — UBROGEPANT 100 MG/1
TABLET ORAL
Qty: 10 TABLET | Refills: 3 | Status: SHIPPED | OUTPATIENT
Start: 2022-03-23

## 2022-03-23 RX ORDER — METHOCARBAMOL 500 MG/1
500 TABLET, FILM COATED ORAL 3 TIMES DAILY
Qty: 40 TABLET | Refills: 2 | Status: SHIPPED | OUTPATIENT
Start: 2022-03-23

## 2022-03-23 NOTE — Clinical Note
The patient told me that she thought her DME company was sending her a new machine but she has not gotten anything yet? Can you look into this? Thanks!      Patty Payne

## 2022-03-23 NOTE — PROGRESS NOTES
[facility] Sleep Follow Up Encounter      Information:   Patient Name: Jerry Strickland  :   1960  Age:   57 y.o. MRN:   [id number]  Account #:  [de-identified]  Today:                3/23/22    Provider:  Daniella Desir DNP, APRN    Chief Complaint   Patient presents with    Follow-up    Sleep Apnea     Pt notes more frequent headaches, some discomfort in her neck noting this might be muscular rather than a problem with her neck. She doesn't note any complaints regarding sleep equipment. Subjective: Jerry Strickland is a 57 y.o. female with a history of mild GUILLERMO, headaches, carpal tunnel syndrome who comes in for a sleep clinic follow up. She is not using her CPAP machine, states she thought the DME company was sending her a new machine. She had some type of sinus surgery and feels like breathing has improved since that time. Has noted more headaches recently. Headache pain is posterior. Denies nausea, light/sound sensitivity with headaches. Denies vision changes or focal symptoms. Noting more stress which seems to be a trigger. Will take OTC medications with some improvement. Noting 3 headaches a week now. She is noting neck pain as well. Notes muscle spasms. Denies radicular pain. Denies weakness. Does note numbness in the left hand, has a history of CTS. Not dropping items. Wearing wrist splints intermittently.       Location or symptom: GUILLERMO  Onset: 1 year ago   Timing:  q hs  Severity: 2 night HST 2020 with AHI 6.7 and 7.2   Associated:  Snoring, witnessed apneas, and excessive daytime somnolence  Alleviated:  CPAP    Objective:     Past Medical History:   Diagnosis Date    Anxiety     Chronic back pain     GERD (gastroesophageal reflux disease)     Heart valve disorder     Hypertension     Sarcoma of breast ([address]     Type 2 diabetes mellitus without complication ([facility] [address]        Past Surgical History:   Procedure Laterality Date    BREAST BIOPSY Right 2018    intraductal papilloma    JOINT REPLACEMENT Bilateral     KNEE SURGERY Bilateral     knee replacement    ROTATOR CUFF REPAIR Right        Recent Hospitalizations  ·     Significant Injuries  ·     Family History   Problem Relation Age of Onset    Cancer Mother     Breast Cancer Mother 80    Cancer Father         Prostate    Heart Disease Father     Diabetes Maternal Grandmother     Heart Disease Maternal Grandmother        Social History  Social History     Tobacco Use   Smoking Status Never Smoker   Smokeless Tobacco Never Used     Social History     Substance and Sexual Activity   Alcohol Use Yes    Comment: occ     Social History     Substance and Sexual Activity   Drug Use No         Current Outpatient Medications   Medication Sig Dispense Refill    methocarbamol (ROBAXIN) 500 MG tablet Take 1 tablet by mouth 3 times daily 40 tablet 2    Ubrogepant (UBRELVY) 100 MG TABS Take 1 tablet at the onset of migraine. May repeat once in 2 hours if no improvement. Do not exceed 2 tablets in 24 hours. 10 tablet 3    atenolol (TENORMIN) 50 MG tablet Take 1 tablet by mouth daily      D3-50 47250 units CAPS Take 1 capsule by mouth once a week      potassium chloride (KLOR-CON M) 20 MEQ extended release tablet Take 20 mEq by mouth daily      montelukast (SINGULAIR) 10 MG tablet Take 1 tablet by mouth daily      HYDROcodone-acetaminophen (NORCO) 7.5-325 MG per tablet Take 1 tablet by mouth 2 times daily .  albuterol sulfate  (90 Base) MCG/ACT inhaler Inhale into the lungs daily as needed       aspirin 81 MG EC tablet Take 81 mg by mouth      furosemide (LASIX) 40 MG tablet Take 40 mg by mouth as needed       lisinopril (PRINIVIL;ZESTRIL) 40 MG tablet 40 mg daily       metFORMIN (GLUCOPHAGE) 500 MG tablet Take 500 mg by mouth as needed       amLODIPine (NORVASC) 5 MG tablet Take 5 mg by mouth daily        No current facility-administered medications for this visit.        Allergies:  Cefdinir and Levofloxacin    Review of Systems:   Constitutional: []? Fever []? Sweat []? Chills []? Recent Injury [x]? Denies all unless marked  HEENT:[x]? Headache  []? Head Injury/Hearing Loss  []? Sore Throat  []? Ear Ache/Dizziness  []? Denies all unless marked  Spine:  [x]? Neck pain  []? Back pain  []? Maye Mccartyvinayak  []? Denies all unless marked  Cardiovascular:[]? Heart Disease []? Chest Pain []? Palpitations  [x]? Denies all unless marked  Pulmonary: []? Shortness of Breath []? Cough   [x]? Denies all unless marke  Gastrointestinal: []? Nausea  []? Vomiting  []? Abdominal Pain  []? Constipation  []? Diarrhea  []? Dark Bloody Stools  [x]? Denies all unless marked  Psychiatric/Behavioral:[]? Depression []? Anxiety [x]? Denies all unless marked  Genitourinary:   []? Frequency  []? Urgency  []? Incontinence []? Pain with Urination  [x]? Denies all unless marked  Extremities: []? Pain  []? Swelling  [x]? Denies all unless marked  Musculoskeletal: [x]? Muscle Pain  []? Joint Pain  []? Arthritis []? Muscle Cramps []? Muscle Twitches  []? Denies all unless marked  Sleep: []? Insomnia []? Snoring []? Restless Legs [x]? Sleep Apnea  []? Daytime Sleepiness  []? Denies all unless marked  Skin:[]? Rash []? Skin Discoloration [x]? Denies all unless marked   Neurological: []? Visual Disturbance/Memory Loss []? Loss of Balance []? Slurred Speech/Weakness []? Seizures  []? Vertigo/Dizziness [x]? Denies all unless marked    The MA has completed the ROS with the patient. I have reviewed it in its' entirety with the patient and agree with the documentation.       Examination:  Vitals:  BP (!) 145/85   Pulse 70   Ht 5' 3\" (1.6 m)   Wt 291 lb (132 kg)   BMI 51.55 kg/m²   General appearance:  alert and cooperative with exam  HEENT:  PERRLA, EOMI, Sclera clear, anicteric, Oropharynx clear, no lesions, Neck supple with midline trachea and Trachea midline  Heart[de-identified]  regular rate and rhythm  Lungs:  clear to auscultation bilaterally  Extremities:  extremities normal, atraumatic, no cyanosis or edema  Neurologic:  Extraocular movements are intact without nystagmus. Visual fields are full to confrontation. Facial movements are symmetrical and normal.  Speech is precise. Extremity strength is normal in both uppers and lowers. Deep tendon reflexes are intact and symmetrical.  Rapid alternating movements are unimpaired. Finger-to-nose testing is performed well, without dysmetria. Gait is normal.    I reviewed the following studies:      []  :  Clinical laboratory test results    []  :  Radiology reports    [x]  :  Review and summarization of medical records and/or obtain medical records     []  :  Previous/recent polysomnogram report(s)    []  :  [place] Sleepiness Scale      [x]  :  Compliance download:  Unavailable, patient not wearing     Assessment:       ICD-10-CM    1. GUILLERMO (obstructive sleep apnea)  G47.33    2. Left wrist pain  M25.532    3. Carpal tunnel syndrome on right  G56.01    4. Nonintractable headache, unspecified chronicity pattern, unspecified headache type  R51.9         []  :  Stable     []  :  Improved                       []  :  Well controlled              []  :  Resolving     []  :  Resolved     [x]  :  Inadequately controlled- GUILLERMO, not currently using CPAP. Will look into patient getting a new machine     Headaches- will add Ubrelvy prn. Carpal tunnel syndrome- wear wrist splints at night     Neck pain- Robaxin prn.      []  :  Worsening     []  :  Additional workup planned        Plan:     Orders Placed This Encounter   Medications    methocarbamol (ROBAXIN) 500 MG tablet     Sig: Take 1 tablet by mouth 3 times daily     Dispense:  40 tablet     Refill:  2    Ubrogepant (UBRELVY) 100 MG TABS     Sig: Take 1 tablet at the onset of migraine. May repeat once in 2 hours if no improvement. Do not exceed 2 tablets in 24 hours. Dispense:  10 tablet     Refill:  3     No orders of the defined types were placed in this encounter.     1.   Patient advised of the etiology, pathophysiology, diagnosis, treatment options, and risks of untreated GUILLERMO. Risks may include, but are not limited to  hypertension, coronary artery disease, diabetes, stroke, weight gain, impaired cognition, daytime somnolence,  and motor vehicle accidents. Advised to abstain from driving or operating heavy machinery when drowsy and the use of respiratory suppressants. 2. Will look into new CPAP machine   3. Ubrelvy prn   4. Wear wrist splints at night for CTS, consider surgical opinion with any worsening   5. Robaxin prn neck pain, muscle spasms. Discussed side effects with patient. 6. Return in about 6 months (around 9/23/2022) for follow up, sooner if worsening.       Bj Ramos DNP, APRN

## 2022-03-23 NOTE — PROGRESS NOTES
REVIEW OF SYSTEMS    Constitutional: []Fever []Sweat []Chills [] Recent Injury [x] Denies all unless marked  HEENT:[x]Headache  [] Head Injury/Hearing Loss  [] Sore Throat  [] Ear Ache/Dizziness  [] Denies all unless marked  Spine:  [x] Neck pain  [] Back pain  [] Sciaticia  [] Denies all unless marked  Cardiovascular:[]Heart Disease []Chest Pain [] Palpitations  [x] Denies all unless marked  Pulmonary: []Shortness of Breath []Cough   [x] Denies all unless marke  Gastrointestinal: []Nausea  []Vomiting  []Abdominal Pain  []Constipation  []Diarrhea  []Dark Bloody Stools  [x] Denies all unless marked  Psychiatric/Behavioral:[] Depression [] Anxiety [x] Denies all unless marked  Genitourinary:   [] Frequency  [] Urgency  [] Incontinence [] Pain with Urination  [x] Denies all unless marked  Extremities: []Pain  []Swelling  [x] Denies all unless marked  Musculoskeletal: [x] Muscle Pain  [] Joint Pain  [] Arthritis [] Muscle Cramps [] Muscle Twitches  [] Denies all unless marked  Sleep: [] Insomnia [] Snoring [] Restless Legs [x] Sleep Apnea  [] Daytime Sleepiness  [] Denies all unless marked  Skin:[] Rash [] Skin Discoloration [x] Denies all unless marked   Neurological: []Visual Disturbance/Memory Loss [] Loss of Balance [] Slurred Speech/Weakness [] Seizures  [] Vertigo/Dizziness [x] Denies all unless marked

## 2022-03-30 ENCOUNTER — TELEPHONE (OUTPATIENT)
Dept: NEUROLOGY | Age: 62
End: 2022-03-30

## 2022-03-30 NOTE — TELEPHONE ENCOUNTER
----- Message from JULIAN Escobedo sent at 3/23/2022  3:31 PM CDT -----  The patient told me that she thought her [employer] company was sending her a new machine but she has not gotten anything yet? Can you look into this? Thanks!  Elvia Iraheta

## 2022-03-30 NOTE — TELEPHONE ENCOUNTER
Called and spoke with patient regarding her DME sleep machine. I have called [employer] to see if patient is suppose to be getting a new machine. They stated that patient was setup in 2020 and is not eligible for new machine until 2025. I have called and spoke with patient to let her know this information. I have also given patient [employer] phone number for her call them with the issues that she is having with her machine.

## 2022-08-30 ENCOUNTER — TELEPHONE (OUTPATIENT)
Dept: SURGERY | Age: 62
End: 2022-08-30

## 2022-08-30 NOTE — TELEPHONE ENCOUNTER
Patient called to reschedule her mammogram and ov with mena alaniz today. Next avail for a mammo is 10/13/22 but per central scheduling the order expires on 10/10/22 so the order needs to be extended. Please return patient's call with new appointments. Thank you.

## 2022-09-26 ENCOUNTER — TELEPHONE (OUTPATIENT)
Dept: NEUROSURGERY | Age: 62
End: 2022-09-26

## 2022-09-28 NOTE — TELEPHONE ENCOUNTER
Appt made letter mailed with appt date and time. Also notified pt by phone ,she verbalized understanding.

## 2022-10-10 ENCOUNTER — OFFICE VISIT (OUTPATIENT)
Dept: SURGERY | Age: 62
End: 2022-10-10
Payer: MEDICAID

## 2022-10-10 ENCOUNTER — HOSPITAL ENCOUNTER (OUTPATIENT)
Dept: WOMENS IMAGING | Age: 62
Discharge: HOME | End: 2022-10-10
Payer: MEDICAID

## 2022-10-10 VITALS
BODY MASS INDEX: 50.64 KG/M2 | OXYGEN SATURATION: 97 % | TEMPERATURE: 98.2 F | HEIGHT: 63 IN | WEIGHT: 285.8 LBS | HEART RATE: 72 BPM

## 2022-10-10 VITALS — BODY MASS INDEX: 49.95 KG/M2 | WEIGHT: 282 LBS

## 2022-10-10 DIAGNOSIS — Z12.31 VISIT FOR SCREENING MAMMOGRAM: Primary | ICD-10-CM

## 2022-10-10 DIAGNOSIS — Z12.31 BREAST CANCER SCREENING BY MAMMOGRAM: ICD-10-CM

## 2022-10-10 PROCEDURE — G8417 CALC BMI ABV UP PARAM F/U: HCPCS | Performed by: PHYSICIAN ASSISTANT

## 2022-10-10 PROCEDURE — G8427 DOCREV CUR MEDS BY ELIG CLIN: HCPCS | Performed by: PHYSICIAN ASSISTANT

## 2022-10-10 PROCEDURE — 77063 BREAST TOMOSYNTHESIS BI: CPT

## 2022-10-10 PROCEDURE — G8484 FLU IMMUNIZE NO ADMIN: HCPCS | Performed by: PHYSICIAN ASSISTANT

## 2022-10-10 PROCEDURE — 3017F COLORECTAL CA SCREEN DOC REV: CPT | Performed by: PHYSICIAN ASSISTANT

## 2022-10-10 PROCEDURE — 99213 OFFICE O/P EST LOW 20 MIN: CPT | Performed by: PHYSICIAN ASSISTANT

## 2022-10-10 PROCEDURE — 1036F TOBACCO NON-USER: CPT | Performed by: PHYSICIAN ASSISTANT

## 2022-10-10 NOTE — PROGRESS NOTES
HPI:  Sue Payne is in for follow-up breast check. She has not noticed any changes in her breasts. Her imaging was reviewed and is noted below. Patient MRN: [id number]   : 1960   Age:  60 years   Gender: Female   Order Date: 10/10/2022 3:01 PM   Exam: NOEL DIGITAL SCREEN W OR WO CAD BILATERAL   Number of Images: Patient name Dulce Cosme   Date of birth 1960   Physician   Digital bilateral screening mammogram with tomosynthesis, dated   10/10/2022 3:01 PM   TECHNIQUE: The digital mammographic exam of both breast in   craniocaudal, mediolateral and mediolateral oblique views along with   R2 CAD was obtained. Hugh  of both breasts were obtained in   craniocaudal, mediolateral and mediolateral oblique medial views. HISTORY: This is a 60 year-old female  4, Para 4 AB 0 for   diagnostic mammogram. Patient has no family history of breast cancer. Patient has no complaints. Reference: Prior mammogram from 08/10/2021, 2020 and 2019   are available for comparison   Breast composition: The breasts are almost entirely fatty. Findings: The digital mammographic examination of both breasts in   craniocaudal, medial lateral and medial lateral oblique view along   with R2 CAD demonstrates both breasts to have involutional fatty   changes. There are scattered benign macrocalcifications in both   breasts. There is thickening of the subareolar screening the right   breast which was seen before and appears to be unchanged. . There is   no evidence of any dendritic  mass, cluster microcalcification or   architectural distortion. The retromammary fat appears to be normal.       Impression   Unchanged from prior mammography No radiographic evidence of   malignancy changes. We would recommend annual follow up with   tomosynthesis unless otherwise clinically indicated. Final assessment: ACR: Bi -RADS - 2.       BREAST EXAM:  On examination, she has fibrocystic changes throughout both breasts, no dominant masses, no skin or nipple changes and no axillary adenopathy. I see nothing suspicious for breast cancer. ASSESSMENT:  Benign fibrocystic changes              PLAN:  I will plan to see her back in 1 year for physical exam and mammograms. She will contact me if anything significant changes. Rituxan Counseling:  I discussed with the patient the risks of Rituxan infusions. Side effects can include infusion reactions, severe drug rashes including mucocutaneous reactions, reactivation of latent hepatitis and other infections and rarely progressive multifocal leukoencephalopathy.  All of the patient's questions and concerns were addressed.

## 2023-02-21 ENCOUNTER — OFFICE VISIT (OUTPATIENT)
Dept: NEUROLOGY | Age: 63
End: 2023-02-21
Payer: MEDICAID

## 2023-02-21 VITALS
DIASTOLIC BLOOD PRESSURE: 76 MMHG | BODY MASS INDEX: 50.5 KG/M2 | SYSTOLIC BLOOD PRESSURE: 142 MMHG | HEIGHT: 63 IN | OXYGEN SATURATION: 99 % | WEIGHT: 285 LBS | HEART RATE: 68 BPM

## 2023-02-21 DIAGNOSIS — M54.2 NECK PAIN: ICD-10-CM

## 2023-02-21 DIAGNOSIS — G43.009 MIGRAINE WITHOUT AURA AND WITHOUT STATUS MIGRAINOSUS, NOT INTRACTABLE: Primary | ICD-10-CM

## 2023-02-21 DIAGNOSIS — R20.0 BILATERAL HAND NUMBNESS: ICD-10-CM

## 2023-02-21 PROCEDURE — G8484 FLU IMMUNIZE NO ADMIN: HCPCS | Performed by: NURSE PRACTITIONER

## 2023-02-21 PROCEDURE — 99213 OFFICE O/P EST LOW 20 MIN: CPT | Performed by: NURSE PRACTITIONER

## 2023-02-21 PROCEDURE — 3078F DIAST BP <80 MM HG: CPT | Performed by: NURSE PRACTITIONER

## 2023-02-21 PROCEDURE — 1036F TOBACCO NON-USER: CPT | Performed by: NURSE PRACTITIONER

## 2023-02-21 PROCEDURE — 3017F COLORECTAL CA SCREEN DOC REV: CPT | Performed by: NURSE PRACTITIONER

## 2023-02-21 PROCEDURE — G8427 DOCREV CUR MEDS BY ELIG CLIN: HCPCS | Performed by: NURSE PRACTITIONER

## 2023-02-21 PROCEDURE — G8417 CALC BMI ABV UP PARAM F/U: HCPCS | Performed by: NURSE PRACTITIONER

## 2023-02-21 PROCEDURE — 3077F SYST BP >= 140 MM HG: CPT | Performed by: NURSE PRACTITIONER

## 2023-02-21 NOTE — PROGRESS NOTES
[facility] Neurology Office Note      Patient:   Diann Lester  MR#:    [id number]  Account Number:                         YOB: 1960  Date of Evaluation:  2/21/2023  Time of Note:                          1:56 PM  Primary/Referring Physician:  JULIAN Medina - CNP   Consulting Physician:  Soto Olmstead DNP, APRN    FOLLOW UP    Chief Complaint   Patient presents with    Follow-up    Sleep Apnea     Pt states things are better hasnt had to use machine since sx on her nose        HISTORY OF PRESENT ILLNESS    Diann Lester is a 60y.o. year old female here for follow up of sleep apnea, headaches, neck pain. She underwent nasal surgery with Dr. Oneal De Jesus last year. Since that time she has felt that she is sleeping better, has not been using CPAP. She denies snoring, gasping for air, daytime somnolence. Headaches are unchanged from prior, intermittent in nature. Headache pain is posterior. Denies nausea, light/sound sensitivity with headaches. Denies vision changes or focal symptoms. Noting more stress which seems to be a trigger. Will take OTC medications with some improvement. Has not tried [place] and [place] yet. She is noting 8-10 headaches in a month. Continues to note neck pain. Notes pain into her shoulder. No clear radicular pain. Denies weakness. She is following with pain management, will be getting more injections in her cervical spine. Continues to note numbness in bilateral hands. She has been dropping items out of her right hand. Symptoms do wake her up at night. Has been wearing wrist splints intermittently.      Past Medical History:   Diagnosis Date    Anxiety     Chronic back pain     GERD (gastroesophageal reflux disease)     Heart valve disorder     Hypertension     Sarcoma of breast ([facility] [address]     Type 2 diabetes mellitus without complication [facility])        Past Surgical History:   Procedure Laterality Date    BREAST BIOPSY Right 2018    intraductal papilloma    JOINT REPLACEMENT Bilateral     KNEE SURGERY Bilateral     knee replacement    ROTATOR CUFF REPAIR Right        Family History   Problem Relation Age of Onset    Cancer Mother     Breast Cancer Mother 80    Cancer Father         Prostate    Heart Disease Father     Diabetes Maternal Grandmother     Heart Disease Maternal Grandmother        Social History     Socioeconomic History    Marital status:      Spouse name: Not on file    Number of children: Not on file    Years of education: Not on file    Highest education level: Not on file   Occupational History    Not on file   Tobacco Use    Smoking status: Never    Smokeless tobacco: Never   Vaping Use    Vaping Use: Never used   Substance and Sexual Activity    Alcohol use: Yes     Comment: occ    Drug use: No    Sexual activity: Yes     Partners: Male   Other Topics Concern    Not on file   Social History Narrative    Not on file     Social Determinants of Health     Financial Resource Strain: Not on file   Food Insecurity: Not on file   Transportation Needs: Not on file   Physical Activity: Not on file   Stress: Not on file   Social Connections: Not on file   Intimate Partner Violence: Not on file   Housing Stability: Not on file       Current Outpatient Medications   Medication Sig Dispense Refill    methocarbamol (ROBAXIN) 500 MG tablet Take 1 tablet by mouth 3 times daily 40 tablet 2    Ubrogepant (UBRELVY) 100 MG TABS Take 1 tablet at the onset of migraine. May repeat once in 2 hours if no improvement. Do not exceed 2 tablets in 24 hours. 10 tablet 3    atenolol (TENORMIN) 50 MG tablet Take 1 tablet by mouth daily      D3-50 91352 units CAPS Take 1 capsule by mouth once a week      potassium chloride (KLOR-CON M) 20 MEQ extended release tablet Take 20 mEq by mouth daily      montelukast (SINGULAIR) 10 MG tablet Take 1 tablet by mouth daily      HYDROcodone-acetaminophen (NORCO) 7.5-325 MG per tablet Take 1 tablet by mouth 2 times daily .       albuterol sulfate  (90 Base) MCG/ACT inhaler Inhale into the lungs daily as needed       aspirin 81 MG EC tablet Take 81 mg by mouth      furosemide (LASIX) 40 MG tablet Take 40 mg by mouth as needed       lisinopril (PRINIVIL;ZESTRIL) 40 MG tablet 40 mg daily       metFORMIN (GLUCOPHAGE) 500 MG tablet Take 500 mg by mouth as needed       amLODIPine (NORVASC) 10 MG tablet Take 10 mg by mouth daily       No current facility-administered medications for this visit. Allergies   Allergen Reactions    Cefdinir     Levofloxacin      REVIEW OF SYSTEMS  Constitutional: []Fever []Sweats []Chills [] Recent Injury [x] Denies all unless marked  HEENT:[]Headache  [] Head Injury [] Hearing Loss  [] Sore Throat  [] Ear Ache [x] Denies all unless marked  Spine:  [] Neck pain  [] Back pain  [] Sciaticia  [x] Denies all unless marked  Cardiovascular:[]Heart Disease []Palpitations [] Chest Pain   [x] Denies all unless marked  Pulmonary: []Shortness of Breath []Cough   [x] Denies all unless marked  Psychiatric/Behavioral:[] Depression [] Anxiety [x] Denies all unless marked  Gastrointestinal: []Nausea  []Vomiting  []Abdominal Pain  []Constipation  []Diarrhea  [x] Denies all unless marked  Genitourinary:   [] Frequency  [] Urgency  [] Dysuria [] Incontinence  [x] Denies all unless marked  Extremities: []Pain  []Swelling  [x] Denies all unless marked  Musculoskeletal: [] Myalgias  [] Joint Pain  [] Arthritis [] Muscle Cramps [] Muscle Twitches  [x] Denies all unless marked  Sleep: []Insomnia[]Snoring []Restless Legs  []Sleep Apnea  []Daytime Sleepiness  [x] Denies all unless marked  Skin:[] Rash [] Color Change [x] Denies all unless marked   Neurological:[]Visual Disturbance [] Memory Loss []Loss of Balance []Slurred Speech []Weakness []Seizures  [] Dizziness [x] Denies all unless marked    The MA has completed the ROS with the patient. I have reviewed it in its' entirety with the patient and agree with the documentation.      PHYSICAL EXAM  BP (!) 142/76   Pulse 68 Ht 5' 3\" (1.6 m)   Wt 285 lb (129.3 kg)   SpO2 99%   BMI 50.49 kg/m²       Constitutional - No acute distress    HEENT- Conjunctiva normal.  No scars, masses, or lesions over external nose or ears, no neck masses noted, no jugular vein distension, no bruit  Cardiac- Regular rate and rhythm  Pulmonary- Good expansion, normal effort without use of accessory muscles  Musculoskeletal - No significant wasting of muscles noted, no bony deformities  Extremities - No clubbing, cyanosis or edema  Skin - Warm, dry, and intact. No rash, erythema, or pallor  Psychiatric - Mood, affect, and behavior appear normal      NEUROLOGICAL EXAM     Mental status   [x] Awake, alert, oriented   [x]Affect attention and concentration appear appropriate  [x]Recent and remote memory appears unremarkable  [x]Speech normal without dysarthria or aphasia, comprehension and repetition intact. COMMENTS:    Cranial Nerves [x]No VF deficit to confrontation,  no papilledema on fundoscopic exam.  [x]PERRLA, EOMI, no nystagmus, conjugate eye movements, no ptosis  [x]Face symmetric  [x]Facial sensation intact  [x]Tongue midline no atrophy or fasciculations present  [x]Palate midline, hearing to finger rub normal bilaterally  [x]Shoulder shrug and SCM testing normal bilaterally  COMMENTS:   Motor   []5/5 strength x 4 extremities  [x]Normal bulk and tone  [x]No tremor present  [x]No rigidity or bradykinesia noted  COMMENTS: 4/5 globally    Sensory  [x]Sensation intact to light touch, pin prick, vibration, and proprioception BLE  [x]Sensation intact to light touch, pin prick, vibration, and proprioception BUE  COMMENTS:   Coordination [x]FTN normal bilaterally   [x]HTS normal bilaterally  [x]DILLON normal bilaterally.    COMMENTS:   Reflexes  [x]Symmetric and non-pathological  [x]Toes down going bilaterally  [x]No clonus present  COMMENTS:   Gait                  [x]Normal steady gait    []Ataxic    []Spastic     []Magnetic     []Shuffling  COMMENTS: LABS RECORD AND IMAGING REVIEW (As below and per HPI)    No results found for: Guanaco Lieberman  No results found for: WBC, HGB, HCT, MCV, PLT  No results found for: NA, K, CL, CO2, BUN, CREATININE, GLUCOSE, CALCIUM, PROT, LABALBU, BILITOT, ALKPHOS, AST, ALT, LABGLOM, GFRAA, AGRATIO, GLOB  No results found for: CHOL, TRIG, HDL, LDLCALC  No results found for: TSH, T4FREE  No results found for: CRP, SEDRATE     NOEL DIGITAL SCREEN BILATERAL    Result Date: 10/18/2022  Patient MRN: [id number] : 1960 Age:  60 years Gender: Female Order Date: 10/10/2022 3:01 PM Exam: NOEL DIGITAL SCREEN W OR WO CAD BILATERAL Number of Images: Patient name Neva Alcazar Date of birth 1960 Physician Digital bilateral screening mammogram with tomosynthesis, dated 10/10/2022 3:01 PM TECHNIQUE: The digital mammographic exam of both breast in craniocaudal, mediolateral and mediolateral oblique views along with R2 CAD was obtained. Hugh  of both breasts were obtained in craniocaudal, mediolateral and mediolateral oblique medial views. HISTORY: This is a 60 year-old female  4, Para 4 AB 0 for diagnostic mammogram. Patient has no family history of breast cancer. Patient has no complaints. Reference: Prior mammogram from 08/10/2021, 2020 and 2019 are available for comparison Breast composition: The breasts are almost entirely fatty. Findings: The digital mammographic examination of both breasts in craniocaudal, medial lateral and medial lateral oblique view along with R2 CAD demonstrates both breasts to have involutional fatty changes. There are scattered benign macrocalcifications in both breasts. There is thickening of the subareolar screening the right breast which was seen before and appears to be unchanged. . There is no evidence of any dendritic  mass, cluster microcalcification or architectural distortion.  The retromammary fat appears to be normal.    Unchanged from prior mammography No radiographic evidence of malignancy changes. We would recommend annual follow up with tomosynthesis unless otherwise clinically indicated. Final assessment: ACR: Bi -RADS - 2. Benign: Also a negative assessment; findings benign abnormalities. Management: Routine mammography screening. Likelihood of cancer: Essentially 0% likelihood of malignancy. Note: If a workup of this patient leads to a biopsy, please forward a copy of the pathology report to our office as required by SA effect of October 1, 1994. A negative mammogram should not preclude biopsy of a clinically palpable suspicious mass, as 10 percent of breast cancers are mammographically occult. This mammography facility is fully accredited by the FDA. Signed by Dr Chance Ruiz MD     NCS/EMG (5/2020)- bilateral median nerve entrapment which is moderate to severe on the right side and mild on the left side     MRI brain (8/2018)- normal     MRI cervical spine (5/2019)-   Impression   1. Degenerative spondylosis as described above   2. Nerve root sleeve cysts are present bilaterally at the C7-T1 level   Signed by Dr Stevie Anand on 5/17/2019 10:33 AM     ASSESSMENT:    Joss Brooks is a 60y.o. year old female here for evaluation of headaches, hand numbness, GUILLERMO. Clinically unchanged from prior visit. She does feel that the hand numbness has worsened somewhat, symptoms are waking her up more frequently at night. Prior NCS/EMG with moderate to severe CTS on the right, mild on the left. Prior MRI cervical spine with mild DDD throughout, following with pain management. Has not been wearing CPAP since nasal surgery with Dr. Brayan Sheth, previous sleep study showed mild GUILLERMO. Given worsening hand symptoms with repeat NCS, consider surgical opinion. Will continue prn medications for migraines. ICD-10-CM    1. Migraine without aura and without status migrainosus, not intractable  G43.009       2. Neck pain  M54.2       3.  Bilateral hand numbness  R20.0 Nerve Conduction Test with EMG PLAN:  1. Repeat NCS/EMG BUE  2. Continue with carpal tunnel wrist splints at night. May need to consider surgical opinion pending NCS   3. Continue OTC medications or Ubrelvy prn headaches   4. Has not been wearing CPAP since nasal surgery. Patient advised of the etiology,  pathophysiology, diagnosis, treatment options, and risks of untreated GUILLERMO. Risks may include, but are not limited to  hypertension, coronary artery disease, diabetes, stroke, weight gain, impaired cognition, daytime somnolence,  and motor vehicle accidents. Advised to abstain from driving or operating heavy machinery when drowsy and the use of respiratory suppressants. 5. Continue with pain management as previously scheduled   6. Return in about 3 months (around 5/21/2023) for follow up, sooner if worsening.     Amaury Trejo DNP, APRN

## 2023-03-17 ENCOUNTER — TRANSCRIBE ORDERS (OUTPATIENT)
Dept: ADMINISTRATIVE | Facility: HOSPITAL | Age: 63
End: 2023-03-17
Payer: COMMERCIAL

## 2023-03-17 DIAGNOSIS — R07.9 CHEST PAIN, UNSPECIFIED TYPE: Primary | ICD-10-CM

## 2023-04-07 ENCOUNTER — HOSPITAL ENCOUNTER (OUTPATIENT)
Dept: CT IMAGING | Facility: HOSPITAL | Age: 63
Discharge: HOME | End: 2023-04-07
Admitting: PHYSICIAN ASSISTANT
Payer: COMMERCIAL

## 2023-04-07 VITALS
TEMPERATURE: 97.9 F | RESPIRATION RATE: 19 BRPM | OXYGEN SATURATION: 100 % | HEART RATE: 59 BPM | SYSTOLIC BLOOD PRESSURE: 126 MMHG | BODY MASS INDEX: 50.68 KG/M2 | HEIGHT: 63 IN | DIASTOLIC BLOOD PRESSURE: 74 MMHG | WEIGHT: 286 LBS

## 2023-04-07 DIAGNOSIS — R07.9 CHEST PAIN, UNSPECIFIED TYPE: ICD-10-CM

## 2023-04-07 LAB
CREAT SERPL-MCNC: 0.78 MG/DL (ref 0.57–1)
EGFRCR SERPLBLD CKD-EPI 2021: 85.5 ML/MIN/1.73

## 2023-04-07 PROCEDURE — 82565 ASSAY OF CREATININE: CPT | Performed by: INTERNAL MEDICINE

## 2023-04-07 PROCEDURE — 25510000001 IOPAMIDOL PER 1 ML: Performed by: PHYSICIAN ASSISTANT

## 2023-04-07 PROCEDURE — 75574 CT ANGIO HRT W/3D IMAGE: CPT | Performed by: INTERNAL MEDICINE

## 2023-04-07 PROCEDURE — 75574 CT ANGIO HRT W/3D IMAGE: CPT

## 2023-04-07 RX ORDER — NITROGLYCERIN 0.4 MG/1
TABLET SUBLINGUAL
Status: COMPLETED | OUTPATIENT
Start: 2023-04-07 | End: 2023-04-07

## 2023-04-07 RX ADMIN — NITROGLYCERIN 0.4 MG: 0.4 TABLET SUBLINGUAL at 15:40

## 2023-04-07 RX ADMIN — IOPAMIDOL 100 ML: 755 INJECTION, SOLUTION INTRAVENOUS at 15:53

## 2023-04-07 RX ADMIN — NITROGLYCERIN 0.4 MG: 0.4 TABLET SUBLINGUAL at 15:43

## 2023-04-07 NOTE — NURSING NOTE
MD discharge orders noted. Patient AAOx4 and in NAD. V/s stable. The patient successfully ambulated to the restroom without any adverse events. IV and tele discontinued. An AVS was provided and discussed completely including the following: f/u appointments, changes to the med recc, and education pertinent to this visit. The patient verbalized their understanding and was escorted via ambulation to their ride's private vehicle.

## 2023-04-20 ENCOUNTER — TELEPHONE (OUTPATIENT)
Dept: NEUROLOGY | Age: 63
End: 2023-04-20

## 2023-04-20 ENCOUNTER — HOSPITAL ENCOUNTER (OUTPATIENT)
Dept: NEUROLOGY | Age: 63
Discharge: HOME | End: 2023-04-20
Payer: MEDICAID

## 2023-04-20 DIAGNOSIS — R20.0 BILATERAL HAND NUMBNESS: ICD-10-CM

## 2023-04-20 PROCEDURE — 95911 NRV CNDJ TEST 9-10 STUDIES: CPT

## 2023-04-20 PROCEDURE — 95886 MUSC TEST DONE W/N TEST COMP: CPT | Performed by: PSYCHIATRY & NEUROLOGY

## 2023-04-20 PROCEDURE — 95886 MUSC TEST DONE W/N TEST COMP: CPT

## 2023-04-20 PROCEDURE — 95911 NRV CNDJ TEST 9-10 STUDIES: CPT | Performed by: PSYCHIATRY & NEUROLOGY

## 2023-05-04 NOTE — TELEPHONE ENCOUNTER
Patient came into office for appointment at [id number] for her 0900 appointment. Spoke with EG who voiced this was her half day and she was booked the rest of scheduled day. Pt is aware of our Late policy see note on 8/3 from . PSR Kenna tried to explain this to patient that she was over 26 minutes late to appointment. Pt was not happy about this and asked if there was someone she could speak to her. Yumiko Dumont asked that I com and speak with patient. After speaking with patient she is not happy repeating \"you do no care for my rosalina; you do not care if I die, who is going to pay for my gas due to me driving all the way down here. \" I again explained that the policy is to keep the patient flow going and we would not be able to see her due to her being 26 minutes late. She stated this always happens. I again explained this is a policy to ensure pts have adequate time with the provider. Provided pt with another appointment time and date and again advised her she would have to be in the office for her appointment before the 15 minutes appointment time allotted for this time. Pt voiced understanding however was not happy at all. [place] Counseling: I discussed with the patient the risks of ixekizumab including but not limited to immunosuppression, serious infections, worsening of inflammatory bowel disease and drug reactions.  The patient understands that monitoring is required including a PPD at baseline and must alert us or the primary physician if symptoms of infection or other concerning signs are noted.

## 2023-05-31 ENCOUNTER — OFFICE VISIT (OUTPATIENT)
Dept: NEUROLOGY | Age: 63
End: 2023-05-31
Payer: MEDICAID

## 2023-05-31 VITALS
DIASTOLIC BLOOD PRESSURE: 70 MMHG | SYSTOLIC BLOOD PRESSURE: 129 MMHG | HEIGHT: 63 IN | OXYGEN SATURATION: 97 % | BODY MASS INDEX: 50.32 KG/M2 | WEIGHT: 284 LBS | HEART RATE: 70 BPM

## 2023-05-31 DIAGNOSIS — M54.2 NECK PAIN: ICD-10-CM

## 2023-05-31 DIAGNOSIS — G43.009 MIGRAINE WITHOUT AURA AND WITHOUT STATUS MIGRAINOSUS, NOT INTRACTABLE: Primary | ICD-10-CM

## 2023-05-31 DIAGNOSIS — R56.9 CONVULSIONS, UNSPECIFIED CONVULSION TYPE (HCC): ICD-10-CM

## 2023-05-31 DIAGNOSIS — G56.03 BILATERAL CARPAL TUNNEL SYNDROME: ICD-10-CM

## 2023-05-31 PROCEDURE — 3017F COLORECTAL CA SCREEN DOC REV: CPT | Performed by: NURSE PRACTITIONER

## 2023-05-31 PROCEDURE — 1036F TOBACCO NON-USER: CPT | Performed by: NURSE PRACTITIONER

## 2023-05-31 PROCEDURE — 3078F DIAST BP <80 MM HG: CPT | Performed by: NURSE PRACTITIONER

## 2023-05-31 PROCEDURE — G8417 CALC BMI ABV UP PARAM F/U: HCPCS | Performed by: NURSE PRACTITIONER

## 2023-05-31 PROCEDURE — G8427 DOCREV CUR MEDS BY ELIG CLIN: HCPCS | Performed by: NURSE PRACTITIONER

## 2023-05-31 PROCEDURE — 99214 OFFICE O/P EST MOD 30 MIN: CPT | Performed by: NURSE PRACTITIONER

## 2023-05-31 PROCEDURE — 3074F SYST BP LT 130 MM HG: CPT | Performed by: NURSE PRACTITIONER

## 2023-05-31 RX ORDER — TIZANIDINE 2 MG/1
TABLET ORAL
COMMUNITY
Start: 2023-05-17

## 2023-05-31 RX ORDER — LEVETIRACETAM 1000 MG/1
TABLET ORAL 2 TIMES DAILY
COMMUNITY
Start: 2023-05-30

## 2023-05-31 NOTE — PROGRESS NOTES
REVIEW OF SYSTEMS    Constitutional: []Fever []Sweats []Chills [] Recent Injury [x] Denies all unless marked  HEENT:[x]Headache  [] Head Injury [] Hearing Loss  [] Sore Throat  [] Ear Ache [x] Denies all unless marked  Spine:  [] Neck pain  [] Back pain  [] Sciaticia  [x] Denies all unless marked  Cardiovascular:[]Heart Disease []Palpitations [] Chest Pain   [x] Denies all unless marked  Pulmonary: []Shortness of Breath []Cough   [x] Denies all unless marked  Psychiatric/Behavioral:[] Depression [] Anxiety [x] Denies all unless marked  Gastrointestinal: []Nausea  []Vomiting  []Abdominal Pain  []Constipation  []Diarrhea  [x] Denies all unless marked  Genitourinary:   [] Frequency  [] Urgency  [] Dysuria [] Incontinence  [x] Denies all unless marked  Extremities: []Pain  []Swelling  [x] Denies all unless marked  Musculoskeletal: [] Myalgias  [] Joint Pain  [] Arthritis [] Muscle Cramps [] Muscle Twitches  [x] Denies all unless marked  Sleep: []Insomnia[]Snoring []Restless Legs  []Sleep Apnea  []Daytime Sleepiness  [x] Denies all unless marked  Skin:[] Rash [] Color Change [x] Denies all unless marked   Neurological:[]Visual Disturbance [] Memory Loss []Loss of Balance []Slurred Speech []Weakness [x]Seizures  [] Dizziness [x] Denies all unless marked
syndrome which is moderate on the right and mild on the left. Some worsening noted from prior study    ASSESSMENT:    Dre Fisher is a 60y.o. year old female here for follow-up of headaches, carpal tunnel syndrome, obstructive sleep apnea, neck pain and new complaints of possible seizure. Exam today is overall nonfocal.  Recent NCS with moderate right-sided and mild left-sided carpal tunnel syndrome. Prior MRI cervical spine with mild DDD throughout, following with pain management. Has not been wearing CPAP since nasal surgery with Dr. Elsy Hart, previous sleep study showed mild GUILLERMO. Will plan for bilateral carpal tunnel wrist splints at this time, consider surgical opinion with any progression. Continue with as needed medications for migraines. Continue follow-up with pain management as previously scheduled. Regarding recent possible seizure, somewhat of an atypical description. However no records from [facility] today. Will plan for records review, continue Keppra for now. Pending records review may need to have EEG, MRI etc. done here. ICD-10-CM    1. Migraine without aura and without status migrainosus, not intractable  G43.009       2. Neck pain  M54.2       3. Bilateral carpal tunnel syndrome  G56.03       4. Convulsions, unspecified convulsion type ([facility] [address]  R56.9         PLAN:  1. Records from recent admission to [facility]. Need to see EEG, MRI, teleneurology reports etc.   2. Wrist splints for CTS bilaterally. Consider surgical opinion but patient wants to get other issues figured out first   3. Continue OTC medications or Ubrelvy prn headaches   4. Has not been wearing CPAP since nasal surgery. Patient advised of the etiology,  pathophysiology, diagnosis, treatment options, and risks of untreated GUILLERMO. Risks may include, but are not limited to  hypertension, coronary artery disease, diabetes, stroke, weight gain, impaired cognition, daytime somnolence,  and motor vehicle accidents.  Advised to

## 2023-07-10 ENCOUNTER — TELEPHONE (OUTPATIENT)
Dept: NEUROLOGY | Age: 63
End: 2023-07-10

## 2023-07-10 NOTE — TELEPHONE ENCOUNTER
Called pt left voicemail requesting call back. However per [place] state law pt must be seizure free for 90 days and requires documentation with a dr visit and paper work from [employer]. Our office does not take license only explains the law and advise that it is not recommended that you drive with seizures.

## 2023-07-10 NOTE — TELEPHONE ENCOUNTER
Pt called asking if Raimundo Kilgore could write a statement giving pt permission to drive again. Pt states since she's stop taking 2 medications she hasn't had a seizure in a month. Please contact pt if needed, thank you.

## 2023-07-11 NOTE — TELEPHONE ENCOUNTER
Negra Carrion's office is calling in re: to last office note. Pt. Is requesting to be released to drive.

## 2023-07-12 NOTE — TELEPHONE ENCOUNTER
Patient returned call in regards to information for driving clearance  Please return call  Thank you

## 2023-07-13 NOTE — TELEPHONE ENCOUNTER
Called pt and PCP. Left voicemail with PCP requesting a call back. Called pt advised her of [address] state law multiple times. She requested a letter stating she was allowed to drive as she stated she has a seizure due to medication. I once again explained to her that we could not do this as the law states she must be seizure free for 90 days. Pt voiced understanding.

## 2023-07-14 ENCOUNTER — TELEPHONE (OUTPATIENT)
Dept: NEUROLOGY | Age: 63
End: 2023-07-14

## 2023-07-17 NOTE — TELEPHONE ENCOUNTER
Pt called to discuss possible kidney removal. She voiced she was referred to dr Therese Cavazos and Cathy to have kidney removed. She wanted a second opinion and questioned if she should have this surgery. I explained to her that we would not be able to make that decision that we do not specialize in that area and she would need to ask pcp or nephrologist for a second opinion.  Pt voiced understanding.,

## 2023-09-06 ENCOUNTER — OFFICE VISIT (OUTPATIENT)
Dept: NEUROLOGY | Age: 63
End: 2023-09-06
Payer: MEDICAID

## 2023-09-06 ENCOUNTER — TELEPHONE (OUTPATIENT)
Dept: NEUROSURGERY | Age: 63
End: 2023-09-06

## 2023-09-06 VITALS
OXYGEN SATURATION: 97 % | DIASTOLIC BLOOD PRESSURE: 77 MMHG | WEIGHT: 286 LBS | HEIGHT: 63 IN | SYSTOLIC BLOOD PRESSURE: 139 MMHG | BODY MASS INDEX: 50.68 KG/M2 | HEART RATE: 64 BPM

## 2023-09-06 DIAGNOSIS — G43.009 MIGRAINE WITHOUT AURA AND WITHOUT STATUS MIGRAINOSUS, NOT INTRACTABLE: Primary | ICD-10-CM

## 2023-09-06 DIAGNOSIS — G56.03 BILATERAL CARPAL TUNNEL SYNDROME: ICD-10-CM

## 2023-09-06 DIAGNOSIS — M54.2 NECK PAIN: ICD-10-CM

## 2023-09-06 DIAGNOSIS — R56.9 CONVULSIONS, UNSPECIFIED CONVULSION TYPE (HCC): ICD-10-CM

## 2023-09-06 PROCEDURE — 1036F TOBACCO NON-USER: CPT | Performed by: NURSE PRACTITIONER

## 2023-09-06 PROCEDURE — 3017F COLORECTAL CA SCREEN DOC REV: CPT | Performed by: NURSE PRACTITIONER

## 2023-09-06 PROCEDURE — G8417 CALC BMI ABV UP PARAM F/U: HCPCS | Performed by: NURSE PRACTITIONER

## 2023-09-06 PROCEDURE — 3075F SYST BP GE 130 - 139MM HG: CPT | Performed by: NURSE PRACTITIONER

## 2023-09-06 PROCEDURE — 99213 OFFICE O/P EST LOW 20 MIN: CPT | Performed by: NURSE PRACTITIONER

## 2023-09-06 PROCEDURE — 3078F DIAST BP <80 MM HG: CPT | Performed by: NURSE PRACTITIONER

## 2023-09-06 PROCEDURE — G8427 DOCREV CUR MEDS BY ELIG CLIN: HCPCS | Performed by: NURSE PRACTITIONER

## 2023-09-06 NOTE — TELEPHONE ENCOUNTER
Karla Hickey called to see if she can come to appt earlier. Already in town at another appt and wanting to coming in earlier. Please advise. Patient: Mp Grace Age: 26 year old Sex: male  MRN: [id number] Encounter Date: 11/26/2020      History     Chief Complaint   Patient presents with   • Hypertension Asymptomatic     HPI  11/26/2020  E16/16  6:09 PM Mp Grace is a 26 year old male who presents to the ED for evaluation of hypertension. The pt denies a h/o HTN. He states that his BP has been increasing recently. He notes that he saw his PCP near the end of 10/2020 and he had a BP of 140/95. The pt notes that he had an appointment with his GI this week and his BP was 150/103 at that time. While the pt was at work today, he checked his BP and noted a diastolic BP of 106. 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. He left work and went home. He checked his BP again and noted a diastolic BP of 122. The pt was concerned, so he came to the ED. The pt has no complaints and states that he feels normal, other than feeling slightly anxious. The pt reports mild intermittent HA recently, but does not have one now. The pt does not believe that he ate anything particularly salty today, but notes that he had a cheeseburger for breakfast. There are no further complaints or modifying factors at this time.    PCP: Cholo Barrett MD     No Known Allergies    Discharge Medication List as of 11/26/2020  6:19 PM      Prior to Admission Medications    Details   esomeprazole (NexIUM) 20 MG capsule Historical Med      fluticasone (FLONASE) 50 MCG/ACT nasal spray Historical Med      loratadine (CLARITIN) 10 MG tablet Historical Med      citalopram (CELEXA) 40 MG tablet TAKE 1 TABLET BY MOUTH DAILYEprescribe, Disp-30 tablet,R-11      ketoconazole (NIZORAL) 2 % shampoo apply shmp 1x/wk x8wk, then prnDisp-120 mL,R-0, Eprescribe             Past Medical History:   Diagnosis Date   • Anxiety    • Seasonal allergies        Past Surgical History:   Procedure Laterality Date   • WISDOM TOOTH EXTRACTION         Family History   Problem Relation Age of Onset   • Cancer  Paternal Grandmother         lung   • Hypertension Maternal Grandfather        Social History     Tobacco Use   • Smoking status: Former Smoker   • Smokeless tobacco: Former User     Quit date: 6/19/2013   Substance Use Topics   • Alcohol use: Yes     Alcohol/week: 0.0 standard drinks     Comment: social   • Drug use: No       Review of Systems   Constitutional: Negative for chills and fever.   HENT: Negative for sore throat.    Eyes: Negative for discharge.   Respiratory: Negative for shortness of breath.    Cardiovascular: Negative for chest pain.   Gastrointestinal: Negative for abdominal pain.   Genitourinary: Negative for dysuria.   Musculoskeletal: Negative for joint swelling.   Skin: Negative for rash.   Neurological: Positive for headaches (mild, intermittent). Negative for seizures.   Hematological: Does not bruise/bleed easily.   Psychiatric/Behavioral: Negative for confusion. The patient is nervous/anxious (slightly).        Physical Exam     ED Triage Vitals [11/26/20 1811]   ED Triage Vitals Group      Temp 98.2 °F (36.8 °C)      Heart Rate (!) 112      Resp       BP (!) 187/115      SpO2 98 %      EtCO2 mmHg       Height       Weight 208 lb 15.9 oz (94.8 kg)      Weight Scale Used       BMI (Calculated)       IBW/kg (Calculated)         Physical Exam   Constitutional: He appears well-developed and well-nourished. No distress.   HENT:   Head: Normocephalic and atraumatic.   Right Ear: External ear normal.   Left Ear: External ear normal.   Mouth/Throat: Oropharynx is clear and moist.   Eyes: Pupils are equal, round, and reactive to light.   Neck: Normal range of motion. Neck supple.   Cardiovascular: Normal rate, regular rhythm and normal heart sounds.   Pulmonary/Chest: Effort normal and breath sounds normal.   Abdominal: Soft. Bowel sounds are normal. There is no abdominal tenderness. There is no rebound and no guarding.   Musculoskeletal: Normal range of motion.   Neurological: He is alert.   Skin:  Skin is warm and dry.   Nursing note and vitals reviewed.    ED Course     Procedures    Lab Results     No results found for this visit on 11/26/20.    Radiology Results     Imaging Results    None         ED Medication Orders (From admission, onward)    Ordered Start     Status Ordering Provider    11/26/20 1818 11/26/20 1819  lisinopril (ZESTRIL) tablet 10 mg  ONCE      Acknowledged ADAMARIS GRIFFIN               [facility]  Vitals  Vitals:    11/26/20 1811 11/26/20 1815   BP: (!) 187/115 (!) 169/102   Pulse: (!) 112 (!) 109   Temp: 98.2 °F (36.8 °C)    TempSrc: Oral    SpO2: 98% 98%   Weight: 94.8 kg        ED Course    6:16 PM  I evaluated the pt. I explained that the pt's BP appears to be trending up, however, as he is asymptomatic, there is no need for emergent work up at this time. I offered to start the pt on BP medication as his BP is elevated, which he is agreeable to. We will administer a dose in the ED and I will send a prescription to his pharmacy. We discussed the plan of care. I recommended that the pt f/u with his PCP. I advised the pt to return to the ED for any new or worsening sx. The pt understands and agrees with the plan. All questions answered.    [facility]  Critical Care time spent on this patient outside of billable procedures:  None    Clinical Impression  ED Diagnosis        Final diagnosis    Essential hypertension                Follow Up:  Cholo Barrett MD  [address] [address]  [phone]    In 1 week            Summary of your Discharge Medications      Take these Medications      Details   lisinopril 10 MG tablet  Commonly known as: ZESTRIL   Take 1 tablet by mouth daily.            Pt is discharged to home/self care in stable condition.      I have reviewed the information recorded by the scribe for accuracy and agree with its contents.    ____________________________________________________________________    Miguelito Doran acting as a scribe for Dr. Landa  Patrice Ibrahim  Dictation # [id number]  Scribe: Miguelito Ibrahim MD  11/26/20 2828

## 2023-09-06 NOTE — PROGRESS NOTES
[facility] Neurology Office Note      Patient:   Waldemar Sullivan  MR#:    [id number]  Account Number:                         YOB: 1960  Date of Evaluation:  9/6/2023  Time of Note:                          2:47 PM  Primary/Referring Physician:  JULIAN Langley - CNP   Consulting Physician:  Darío Hernandez DNP, APRN    FOLLOW UP    Chief Complaint   Patient presents with    Follow-up    Migraine     Pt states things are good    Sleep Apnea     HISTORY OF PRESENT ILLNESS    Waldemar Sullivan is a 58y.o. year old female here for follow up of sleep apnea, headaches, neck pain, possible seizure. She reports that she is scheduled for nephrectomy d/t renal mass at [facility]. Denies breakthrough events. Prior event occurred as she was getting off the toilet, began having tunnel vision that waxed and waned and some difficulty with her movements. No overt syncope. No overt GTC activity. She laid down after this episode. When she woke up from this she continued to note an atypical sensation, waxing and waning tunnel vision so she went to the ER. She was evaluated by teleneurology and had episode while on telehealth with them, felt to have a focal seizure and started on Keppra. Headaches have overall resolved. In the past she described pain as posterior. No clear migrainous features, vision changes or focal symptoms. Stress seems to play a role in headaches. Taking Ubrelvy or OTC medications prn with benefit. Neck pain unchanged. Notes pain into her left shoulder. No clear radicular pain. Denies weakness. She is following with pain management, will be getting more injections in her cervical spine. Taking Robaxin with benefit as well. Numbness is bilateral hands is about the same. She has been dropping items out of her right hand. Symptoms do wake her up at night. Has been wearing wrist splints but no clear improvement. She underwent nasal surgery with Dr. Deana Rangel last year.  Since that time she has

## 2023-09-15 ENCOUNTER — OFFICE VISIT (OUTPATIENT)
Dept: CARDIOLOGY | Facility: CLINIC | Age: 63
End: 2023-09-15
Payer: COMMERCIAL

## 2023-09-15 VITALS
SYSTOLIC BLOOD PRESSURE: 138 MMHG | DIASTOLIC BLOOD PRESSURE: 81 MMHG | OXYGEN SATURATION: 97 % | HEART RATE: 59 BPM | BODY MASS INDEX: 49.96 KG/M2 | WEIGHT: 282 LBS | HEIGHT: 63 IN

## 2023-09-15 DIAGNOSIS — I10 PRIMARY HYPERTENSION: ICD-10-CM

## 2023-09-15 DIAGNOSIS — Z01.810 PREOP CARDIOVASCULAR EXAM: ICD-10-CM

## 2023-09-15 DIAGNOSIS — R00.1 BRADYCARDIA: ICD-10-CM

## 2023-09-15 DIAGNOSIS — I47.1 PAROXYSMAL SVT (SUPRAVENTRICULAR TACHYCARDIA): ICD-10-CM

## 2023-09-15 DIAGNOSIS — R07.89 ATYPICAL CHEST PAIN: Primary | ICD-10-CM

## 2023-09-15 DIAGNOSIS — C64.1 RENAL CELL CARCINOMA OF RIGHT KIDNEY: ICD-10-CM

## 2023-09-15 DIAGNOSIS — E11.9 TYPE 2 DIABETES MELLITUS WITHOUT COMPLICATION, WITHOUT LONG-TERM CURRENT USE OF INSULIN: ICD-10-CM

## 2023-09-15 DIAGNOSIS — Z87.74 HX OF PERCUTANEOUS TRANSCATHETER CLOSURE OF CONGENITAL ASD: ICD-10-CM

## 2023-09-15 DIAGNOSIS — R00.2 PALPITATIONS: ICD-10-CM

## 2023-09-15 PROBLEM — I47.10 PAROXYSMAL SVT (SUPRAVENTRICULAR TACHYCARDIA): Status: ACTIVE | Noted: 2023-09-15

## 2023-09-15 NOTE — PROGRESS NOTES
Patti Gilbert  [id number]  1960  63 y.o.  female    Referring Provider: Jocelin Thakkar APRN    Reason for  Visit:  Initial visit       Subjective     Chest pain both with exertion as well as at rest.  Feels episodes of chest pain occur no more often with exertion  Moderate substernal,   Pressure like   Chest pain non pleuritic  Chest pain non positional and non gustatory   Lasts less than 5 minutes    Started more than 3 months ago  Occurs once or twice a week on the average but can be variable in frequency  No associated diaphoresis    No associated nausea  No radiation    Relieved with rest or spontaneously  Not positional    No change with intake of food or antacids  No change with breathing  Mild to moderate associated dyspnea    No similar chest pain episodes in the past    No bleeding, excessive bruising, gait instability or fall risks    chest pain persists      Preoperative cardiovascular clearance under general anesthesia for right renal cell ca due for surgery next week     History of present illness:  Patti Gilbert is a 63 y.o. yo female with Type 2 diabetes mellitus  who presents today for   Chief Complaint   Patient presents with    [facility]     New Patient: Cardiac risk assessment for Madison Bonner MD at [facility] on 09/22/2023   .    History  Past Medical History:   Diagnosis Date    Atrial septal defect     2232010    Breast cancer     COPD (chronic obstructive pulmonary disease)     Diabetes mellitus     H/O cardiac catheterization     History of left fractured kneecap     Hypertension     Mini Stroke     Morbid obesity     Myocardial infarction     Recretional Drug use     Right knee injury    ,   Past Surgical History:   Procedure Laterality Date    BREAST MASS EXCISION      2010    CARDIAC CATHETERIZATION      CARDIAC SURGERY      Closure of ASD    GANGLION CYST EXCISION      KNEE ARTHROSCOPY Right 01/13/2009    ROTATOR CUFF REPAIR      TUBAL ABDOMINAL LIGATION Bilateral    ,   Family  History   Problem Relation Age of Onset    Alzheimer's disease Mother     Cancer Mother     Heart disease Father     Cancer Father     No Known Problems Sister     Heart failure Brother     Diabetes Sister     Diabetes Brother    ,   Social History     Tobacco Use    Smoking status: Never     Passive exposure: Never    Smokeless tobacco: Never   Vaping Use    Vaping Use: Never used   Substance Use Topics    Alcohol use: Yes     Comment: occ.    Drug use: Yes     Types: Oxycodone   ,     Medications  Current Outpatient Medications   Medication Sig Dispense Refill    albuterol sulfate  (90 Base) MCG/ACT inhaler Inhale 2 puffs Every 6 (Six) Hours As Needed for Wheezing or Shortness of Air.      amLODIPine (NORVASC) 5 MG tablet TAKE ONE TABLET BY MOUTH EVERY DAY 30 tablet 3    aspirin 81 MG EC tablet Take 1 tablet by mouth Daily.      atenolol (TENORMIN) 25 MG tablet Take 1 tablet by mouth Every Night.      cetirizine (zyrTEC) 10 MG tablet Take 1 tablet by mouth Daily As Needed for Allergies.      furosemide (LASIX) 40 MG tablet Take 1 tablet by mouth As Needed.      HYDROcodone-acetaminophen (NORCO) 7.5-325 MG per tablet Take 1 tablet by mouth Every 6 (Six) Hours As Needed for Moderate Pain.      lisinopril (PRINIVIL,ZESTRIL) 40 MG tablet Take 1 tablet by mouth Daily.      montelukast (SINGULAIR) 10 MG tablet Take 1 tablet by mouth Daily As Needed.       No current facility-administered medications for this visit.       Allergies:  Cefdinir, Empagliflozin, Levofloxacin, Rosuvastatin, Acetaminophen, and Hydrocodone-acetaminophen    Review of Systems  Review of Systems   Constitutional: Negative. Positive for malaise/fatigue.   HENT: Negative.     Eyes: Negative.    Cardiovascular:  Positive for chest pain and dyspnea on exertion. Negative for claudication, cyanosis, irregular heartbeat, leg swelling, near-syncope, orthopnea, palpitations, paroxysmal nocturnal dyspnea and syncope.   Respiratory: Negative.      Endocrine: Negative.    Hematologic/Lymphatic: Negative.    Skin: Negative.    Musculoskeletal:  Positive for arthritis, back pain and joint pain.   Gastrointestinal:  Negative for anorexia.   Genitourinary: Negative.    Neurological: Negative.    Psychiatric/Behavioral: Negative.       Narrative & Impression    Procedure: CT angiography of the coronary arteries with intravenous contrast including 3D image postprocessing including evaluation of current cardiac structure and morphology with coronary artery calcium scoring        ______________________________________________________________________  Acquisition mode:  Prospective ECG triggering   Contrast type and volume:  Isovue   Medication used:  Nitroglycerin beta-blocker therapy when indicated   complications: None immediate  Image quality: Satisfactory ,   Some misregistration artifacts noted. This decreases overall accuracy of the test  Signal to noise.  Satisfactory  Scanner and settings:Siemens force dual source CT scanner using iterative reconstruction and prospective gating  Preliminary  study was obtained followed by coronary artery calcium scoring procedure.    Following administration of contrast collimated images were obtained through the coronary arteries.    Data was transferred offline for 3D reconstruction including curved multiplanar reformatting and multiplanar imaging.  Indication: Low to moderate suspicion of coronary artery disease   ALARA follow to minimize radiation  Study Notes        Lopez Oneil on 4/7/2023  3:53 PM CDT            ______________________________________________________________________     Coronary angiography:      Left Main: The left main is a normal caliber vessel with a normal take off from the left coronary cusp that   bifurcates to form a left anterior descending artery and a left circumflex artery.     Left anterior descending artery: Arises normally from the left main coronary artery and gives off  septal and diagonal branches.     Left circumflex artery: Arises normally from the left main coronary artery and gives off obtuse marginal branches.     Right coronary artery: Arises normally from the right coronary cusp.  It bifurcates into the right posterior descending coronary artery and the right posterior lateral arteries.  Right coronary artery is dominant.     Right atrium: Normal size  No filling defects noted     Right ventricle: Normal size   No filling defects noted.     Left Atrium:  Left atrial size is normal in size  No left atrial appendage filling defect  Has interatrial septal closure device  Left Ventricle: The ventricular cavity size is within normal limits. There are no stigmata of prior infarction. There is no abnormal filling defect.      Coronary angiography:  See below     Pulmonary arteries: Normal in size without proximal filling defect      Pulmonary veins: Normal pulmonary venous drainage. There were four  Noted pulmonary veins, two on the right and two on the left.      Pericardium:  Normal thickness with no significant effusion or calcium present.       Cardiac valves:  There is no thickening or calcifications in the aortic and mitral valves.      Aorta: Normal caliber.     Extra-cardiac findings: Arthritic changes of the spine noted  There are no significant extra-cardiac findings in the available limited views of the lungs, mediastinum, and abdomen.    No unusual findings for age        ______________________________________________________________________        Cardiac CT angiography  4/7/2023     Impression:      Total calcium score (using BETTS calcium score calculator): 0       Normal left main coronary artery     Proximal and mid left anterior descending coronary artery does not have any significant disease  Distal left anterior descending coronary artery is not well visualized  Normal diagonal branch  Normal proximal mid and mid to distal left circumflex coronary artery  The  "very distal portion is not well visualized of the left circumflex coronary artery  Right coronary artery does not have any obvious significant disease  Right posterior descending coronary artery and right posterior lateral artery appears to be normal  Due to superobesity as well as due to misregistration artifacts overall image quality is subpar          ___________________________________________________________________________     Recommendations:     Ongoing risk factor modifications  Further evaluation if ongoing chest pain or high risk of suspicion of significant ischemic heart disease          Objective     Physical Exam:  /81 (BP Location: Left arm, Patient Position: Sitting, Cuff Size: Adult)   Pulse 59   Ht 160 cm (62.99\")   Wt 128 kg (282 lb)   LMP  (LMP Unknown)   SpO2 97%   BMI 49.97 kg/m²     Physical Exam  Constitutional:       Appearance: She is well-developed.   HENT:      Head: Normocephalic.   Neck:      Vascular: Normal carotid pulses. No carotid bruit or JVD.      Trachea: No tracheal tenderness or tracheal deviation.   Cardiovascular:      Rate and Rhythm: Regular rhythm.      Pulses: Normal pulses.      Heart sounds: Normal heart sounds.   Pulmonary:      Effort: Pulmonary effort is normal.      Breath sounds: No stridor.   Abdominal:      Palpations: Abdomen is soft.   Musculoskeletal:      Cervical back: No edema.   Skin:     General: Skin is warm.   Neurological:      Mental Status: She is alert.      Cranial Nerves: No cranial nerve deficit.      Sensory: No sensory deficit.   Psychiatric:         Speech: Speech normal.         Behavior: Behavior normal.       Results Review:    Results for orders placed during the hospital encounter of 10/23/19    Adult Stress Echo W/ Cont or Stress Agent if Necessary Per Protocol    Interpretation Summary  · Low risk for ischemia      "   ____________________________________________________________________________________________________________________________________________  Health maintenance and recommendations    Low salt/ HTN/ Heart healthy carbohydrate restricted cardiac diet   The patient is advised to reduce or avoid caffeine or other cardiac stimulants.   Minimize or avoid  NSAID-type medications      Monitor for any signs of bleeding including red or dark stools. Fall precautions.   Advised staying uptodate with immunizations per established standard guidelines.    Offered to give patient  a copy of my notes     Questions were encouraged, asked and answered to the patient's  understanding and satisfaction. Questions if any regarding current medications and side effects, need for refills and importance of compliance to medications stressed.    Reviewed available prior notes, consults, prior visits, laboratory findings, radiology and cardiology relevant reports. Updated chart as applicable. I have reviewed the patient's medical history in detail and updated the computerized patient record as relevant.      Updated patient regarding any new or relevant abnormalities on review of records or any new findings on physical exam. Mentioned to patient about purpose of visit and desirable health short and long term goals and objectives.    Primary to monitor CBC CMP Lipid panel and TSH as applicable    ___________________________________________________________________________________________________________________________________________     ECG 12 Lead    Date/Time: 9/15/2023 10:49 AM  Performed by: Mark Summers MD  Authorized by: Mark Summers MD   Comparison: compared with previous ECG from 12/5/2019  Comparison to previous ECG: Ventricular rate changed from   66   to    59   beats per minute      Rhythm: sinus bradycardia  Rate: bradycardic  Conduction: conduction normal  ST Segments: ST segments normal  T Waves: T waves normal  QRS axis:  normal  Other: no other findings    Clinical impression: normal ECG        Assessment & Plan   Diagnoses and all orders for this visit:    1. Atypical chest pain (Primary)    2. Primary hypertension    3. Palpitations    4. Bradycardia    5. Type 2 diabetes mellitus without complication, without long-term current use of insulin    6. Paroxysmal SVT (supraventricular tachycardia)    7. Renal cell carcinoma of right kidney    8. Preop cardiovascular exam right renal cell ca [place] 09/22/2023    9. Hx of percutaneous transcatheter closure of congenital ASD    Other orders  -     ECG 12 Lead          Plan      Acceptable cardiovascular risk of planned procedure: renal cell ca surgery  Can proceed with surgery with usual caution and perioperative hemodynamic and cardiac rhythm monitoring.       Will defer cardiac cath as calcium score 0 and normal coronaries proximally     Possible cath after surgery     Patient expressed understanding  Encouraged and answered all questions   Discussed with the patient and all questioned fully answered. She will call me if any problems arise.   Discussed results of prior testing with patient : cardiac CTA as above   as well electrocardiogram from today       MDM     Amount and/or Complexity of Data Reviewed  Clinical lab tests: reviewed  Tests in the medicine section of CPT®: reviewed  Review and summarize past medical records: yes  Independent visualization of images, tracings, or specimens: yes    Risk of Complications, Morbidity, and/or Mortality  Presenting problems: moderate  Diagnostic procedures: moderate  Management options: moderate     Recommend evaluation for obstructive sleep apnea  by primary provider  Has body habitus including oropharyngeal crowding increasing the likelihood of obstructive sleep apnea        Return in about 6 weeks (around 10/27/2023).

## 2023-10-30 ENCOUNTER — OFFICE VISIT (OUTPATIENT)
Dept: CARDIOLOGY | Facility: CLINIC | Age: 63
End: 2023-10-30
Payer: COMMERCIAL

## 2023-10-30 ENCOUNTER — PREP FOR SURGERY (OUTPATIENT)
Dept: OTHER | Facility: HOSPITAL | Age: 63
End: 2023-10-30
Payer: COMMERCIAL

## 2023-10-30 VITALS
BODY MASS INDEX: 49.96 KG/M2 | WEIGHT: 282 LBS | SYSTOLIC BLOOD PRESSURE: 132 MMHG | HEIGHT: 63 IN | HEART RATE: 61 BPM | DIASTOLIC BLOOD PRESSURE: 83 MMHG | OXYGEN SATURATION: 99 %

## 2023-10-30 DIAGNOSIS — R07.89 ATYPICAL CHEST PAIN: ICD-10-CM

## 2023-10-30 DIAGNOSIS — E66.01 MORBID (SEVERE) OBESITY DUE TO EXCESS CALORIES: ICD-10-CM

## 2023-10-30 DIAGNOSIS — I10 PRIMARY HYPERTENSION: Primary | ICD-10-CM

## 2023-10-30 DIAGNOSIS — R07.89 ATYPICAL CHEST PAIN: Primary | ICD-10-CM

## 2023-10-30 DIAGNOSIS — E11.9 TYPE 2 DIABETES MELLITUS WITHOUT COMPLICATION, WITHOUT LONG-TERM CURRENT USE OF INSULIN: ICD-10-CM

## 2023-10-30 RX ORDER — ASPIRIN 81 MG/1
81 TABLET ORAL DAILY
OUTPATIENT
Start: 2023-10-31

## 2023-10-30 RX ORDER — SODIUM CHLORIDE 9 MG/ML
40 INJECTION, SOLUTION INTRAVENOUS AS NEEDED
OUTPATIENT
Start: 2023-10-30

## 2023-10-30 RX ORDER — SODIUM CHLORIDE 0.9 % (FLUSH) 0.9 %
10 SYRINGE (ML) INJECTION EVERY 12 HOURS SCHEDULED
OUTPATIENT
Start: 2023-10-30

## 2023-10-30 RX ORDER — NITROGLYCERIN 0.4 MG/1
0.4 TABLET SUBLINGUAL
OUTPATIENT
Start: 2023-10-30

## 2023-10-30 RX ORDER — ASPIRIN 325 MG
325 TABLET ORAL ONCE
OUTPATIENT
Start: 2023-10-30 | End: 2023-10-30

## 2023-10-30 RX ORDER — SODIUM CHLORIDE 0.9 % (FLUSH) 0.9 %
10 SYRINGE (ML) INJECTION AS NEEDED
OUTPATIENT
Start: 2023-10-30

## 2023-10-30 RX ORDER — ONDANSETRON 2 MG/ML
4 INJECTION INTRAMUSCULAR; INTRAVENOUS EVERY 6 HOURS PRN
OUTPATIENT
Start: 2023-10-30

## 2023-10-30 NOTE — PROGRESS NOTES
Chief Complaint  Chest Pain (6wk F/U)    Subjective          Patti Gilbert presents to [facility] for routine follow-up.  She recently underwent surgery for right renal cell carcinoma.  Prior to surgery she was evaluated by cardiology for complaints of chest pain and had a CT angiogram of the coronary arteries on 4/7/2023 that revealed coronary calcium score of 0 with no obvious coronary artery disease.  She has hypertension, type 2 diabetes mellitus, COPD and morbid obesity.  She continues to complain of intermittent chest pain and shortness of breath. Patient denies chest pain, shortness of breath, palpitations, dizziness, syncope, orthopnea, PND, edema or decreased stamina.  Patient denies any signs of bleeding.    Hypertension  This is a chronic problem. The current episode started more than 1 year ago. The problem is controlled. Associated symptoms include chest pain. Pertinent negatives include no anxiety, blurred vision, headaches, malaise/fatigue, neck pain, orthopnea, palpitations, peripheral edema, PND, shortness of breath or sweats. Risk factors for coronary artery disease include obesity and diabetes mellitus. Current antihypertension treatment includes beta blockers, calcium channel blockers and ACE inhibitors. The current treatment provides significant improvement.       Objective     Current Outpatient Medications:     albuterol sulfate  (90 Base) MCG/ACT inhaler, Inhale 2 puffs Every 6 (Six) Hours As Needed for Wheezing or Shortness of Air., Disp: , Rfl:     amLODIPine (NORVASC) 5 MG tablet, TAKE ONE TABLET BY MOUTH EVERY DAY, Disp: 30 tablet, Rfl: 3    aspirin 81 MG EC tablet, Take 1 tablet by mouth Daily., Disp: , Rfl:     atenolol (TENORMIN) 25 MG tablet, Take 1 tablet by mouth Every Night., Disp: , Rfl:     cetirizine (zyrTEC) 10 MG tablet, Take 1 tablet by mouth Daily As Needed for Allergies., Disp: , Rfl:     furosemide (LASIX) 40 MG tablet, Take 1 tablet by  "mouth As Needed., Disp: , Rfl:     HYDROcodone-acetaminophen (NORCO) 7.5-325 MG per tablet, Take 1 tablet by mouth Every 6 (Six) Hours As Needed for Moderate Pain., Disp: , Rfl:     lisinopril (PRINIVIL,ZESTRIL) 40 MG tablet, Take 1 tablet by mouth Daily., Disp: , Rfl:     montelukast (SINGULAIR) 10 MG tablet, Take 1 tablet by mouth Daily As Needed., Disp: , Rfl:   Vital Signs:   /83   Pulse 61   Ht 160 cm (63\")   Wt 128 kg (282 lb)   SpO2 99%   BMI 49.95 kg/m²     Vitals and nursing note reviewed.   Constitutional:       General: Not in acute distress.     Appearance: Normal and healthy appearance. Well-developed, normal weight and not in distress. Morbidly obese. Not diaphoretic.   Eyes:      General: Lids are normal.         Right eye: No discharge.         Left eye: No discharge.      Conjunctiva/sclera: Conjunctivae normal.      Pupils: Pupils are equal, round, and reactive to light.   HENT:      Head: Normocephalic and atraumatic.      Jaw: There is normal jaw occlusion.      Right Ear: External ear normal.      Left Ear: External ear normal.      Nose: Nose normal.   Neck:      Thyroid: No thyromegaly.      Vascular: No carotid bruit, JVD or JVR. JVD normal.      Trachea: Trachea normal. No tracheal deviation.   Pulmonary:      Effort: Pulmonary effort is normal. No respiratory distress.      Breath sounds: Normal breath sounds. No decreased breath sounds. No wheezing. No rhonchi. No rales.   Chest:      Chest wall: Not tender to palpatation.   Cardiovascular:      PMI at left midclavicular line. Normal rate. Regular rhythm. Normal S1. Normal S2.       Murmurs: There is no murmur.      No gallop.  No click. No rub.   Pulses:     Intact distal pulses. No decreased pulses.   Edema:     Peripheral edema absent.   Abdominal:      General: Bowel sounds are normal. There is no distension.      Palpations: Abdomen is soft.      Tenderness: There is no abdominal tenderness.   Musculoskeletal: Normal range " of motion.         General: No tenderness or deformity.      Cervical back: Normal range of motion and neck supple. Skin:     General: Skin is warm and dry.      Coloration: Skin is not pale.      Findings: No erythema or rash.   Neurological:      General: No focal deficit present.      Mental Status: Alert, oriented to person, place, and time and oriented to person, place and time.   Psychiatric:         Attention and Perception: Attention and perception normal.         Mood and Affect: Mood and affect normal.         Speech: Speech normal.         Behavior: Behavior normal.         Thought Content: Thought content normal.         Cognition and Memory: Cognition and memory normal.         Judgment: Judgment normal.        Result Review :   The following data was reviewed by: HOWARD Luciano on 10/30/2023:  Common labs          4/7/2023    15:06   Common Labs   Creatinine 0.78      Data reviewed : Cardiology studies ct angiogram of the coronary arteries 4/7/23           Assessment and Plan    Diagnoses and all orders for this visit:    1. Primary hypertension (Primary)-blood pressures are well controlled.  Continue lisinopril, atenolol and amlodipine.    2. Type 2 diabetes mellitus without complication, without long-term current use of insulin-management per PCP.  Stable.    3. Atypical chest pain-normal CT angiogram of the coronary arteries 4/7/2023. Pt to be scheduled for left heart catheterization.     4. Morbid (severe) obesity due to excess calories-  Class 3 Severe Obesity (BMI >=40). Obesity-related health conditions include the following: hypertension and diabetes mellitus. Obesity is unchanged. BMI is is above average; no BMI management plan is appropriate. We discussed low calorie, low carb based diet program, portion control, and increasing exercise.        Follow Up   Return in about 4 weeks (around 11/27/2023) for Next scheduled follow up.  Patient was given instructions and counseling  regarding her condition or for health maintenance advice. Please see specific information pulled into the AVS if appropriate.

## 2023-10-30 NOTE — H&P (VIEW-ONLY)
Chief Complaint  Chest Pain (6wk F/U)    Subjective          Patti Gilbert presents to [facility] for routine follow-up.  She recently underwent surgery for right renal cell carcinoma.  Prior to surgery she was evaluated by cardiology for complaints of chest pain and had a CT angiogram of the coronary arteries on 4/7/2023 that revealed coronary calcium score of 0 with no obvious coronary artery disease.  She has hypertension, type 2 diabetes mellitus, COPD and morbid obesity.  She continues to complain of intermittent chest pain and shortness of breath. Patient denies chest pain, shortness of breath, palpitations, dizziness, syncope, orthopnea, PND, edema or decreased stamina.  Patient denies any signs of bleeding.    Hypertension  This is a chronic problem. The current episode started more than 1 year ago. The problem is controlled. Associated symptoms include chest pain. Pertinent negatives include no anxiety, blurred vision, headaches, malaise/fatigue, neck pain, orthopnea, palpitations, peripheral edema, PND, shortness of breath or sweats. Risk factors for coronary artery disease include obesity and diabetes mellitus. Current antihypertension treatment includes beta blockers, calcium channel blockers and ACE inhibitors. The current treatment provides significant improvement.       Objective     Current Outpatient Medications:     albuterol sulfate  (90 Base) MCG/ACT inhaler, Inhale 2 puffs Every 6 (Six) Hours As Needed for Wheezing or Shortness of Air., Disp: , Rfl:     amLODIPine (NORVASC) 5 MG tablet, TAKE ONE TABLET BY MOUTH EVERY DAY, Disp: 30 tablet, Rfl: 3    aspirin 81 MG EC tablet, Take 1 tablet by mouth Daily., Disp: , Rfl:     atenolol (TENORMIN) 25 MG tablet, Take 1 tablet by mouth Every Night., Disp: , Rfl:     cetirizine (zyrTEC) 10 MG tablet, Take 1 tablet by mouth Daily As Needed for Allergies., Disp: , Rfl:     furosemide (LASIX) 40 MG tablet, Take 1 tablet by  "mouth As Needed., Disp: , Rfl:     HYDROcodone-acetaminophen (NORCO) 7.5-325 MG per tablet, Take 1 tablet by mouth Every 6 (Six) Hours As Needed for Moderate Pain., Disp: , Rfl:     lisinopril (PRINIVIL,ZESTRIL) 40 MG tablet, Take 1 tablet by mouth Daily., Disp: , Rfl:     montelukast (SINGULAIR) 10 MG tablet, Take 1 tablet by mouth Daily As Needed., Disp: , Rfl:   Vital Signs:   /83   Pulse 61   Ht 160 cm (63\")   Wt 128 kg (282 lb)   SpO2 99%   BMI 49.95 kg/m²     Vitals and nursing note reviewed.   Constitutional:       General: Not in acute distress.     Appearance: Normal and healthy appearance. Well-developed, normal weight and not in distress. Morbidly obese. Not diaphoretic.   Eyes:      General: Lids are normal.         Right eye: No discharge.         Left eye: No discharge.      Conjunctiva/sclera: Conjunctivae normal.      Pupils: Pupils are equal, round, and reactive to light.   HENT:      Head: Normocephalic and atraumatic.      Jaw: There is normal jaw occlusion.      Right Ear: External ear normal.      Left Ear: External ear normal.      Nose: Nose normal.   Neck:      Thyroid: No thyromegaly.      Vascular: No carotid bruit, JVD or JVR. JVD normal.      Trachea: Trachea normal. No tracheal deviation.   Pulmonary:      Effort: Pulmonary effort is normal. No respiratory distress.      Breath sounds: Normal breath sounds. No decreased breath sounds. No wheezing. No rhonchi. No rales.   Chest:      Chest wall: Not tender to palpatation.   Cardiovascular:      PMI at left midclavicular line. Normal rate. Regular rhythm. Normal S1. Normal S2.       Murmurs: There is no murmur.      No gallop.  No click. No rub.   Pulses:     Intact distal pulses. No decreased pulses.   Edema:     Peripheral edema absent.   Abdominal:      General: Bowel sounds are normal. There is no distension.      Palpations: Abdomen is soft.      Tenderness: There is no abdominal tenderness.   Musculoskeletal: Normal range " of motion.         General: No tenderness or deformity.      Cervical back: Normal range of motion and neck supple. Skin:     General: Skin is warm and dry.      Coloration: Skin is not pale.      Findings: No erythema or rash.   Neurological:      General: No focal deficit present.      Mental Status: Alert, oriented to person, place, and time and oriented to person, place and time.   Psychiatric:         Attention and Perception: Attention and perception normal.         Mood and Affect: Mood and affect normal.         Speech: Speech normal.         Behavior: Behavior normal.         Thought Content: Thought content normal.         Cognition and Memory: Cognition and memory normal.         Judgment: Judgment normal.        Result Review :   The following data was reviewed by: HOWARD Luciano on 10/30/2023:  Common labs          4/7/2023    15:06   Common Labs   Creatinine 0.78      Data reviewed : Cardiology studies ct angiogram of the coronary arteries 4/7/23           Assessment and Plan    Diagnoses and all orders for this visit:    1. Primary hypertension (Primary)-blood pressures are well controlled.  Continue lisinopril, atenolol and amlodipine.    2. Type 2 diabetes mellitus without complication, without long-term current use of insulin-management per PCP.  Stable.    3. Atypical chest pain-normal CT angiogram of the coronary arteries 4/7/2023. Pt to be scheduled for left heart catheterization.     4. Morbid (severe) obesity due to excess calories-  Class 3 Severe Obesity (BMI >=40). Obesity-related health conditions include the following: hypertension and diabetes mellitus. Obesity is unchanged. BMI is is above average; no BMI management plan is appropriate. We discussed low calorie, low carb based diet program, portion control, and increasing exercise.        Follow Up   Return in about 4 weeks (around 11/27/2023) for Next scheduled follow up.  Patient was given instructions and counseling  regarding her condition or for health maintenance advice. Please see specific information pulled into the AVS if appropriate.

## 2023-11-17 ENCOUNTER — HOSPITAL ENCOUNTER (OUTPATIENT)
Facility: HOSPITAL | Age: 63
Setting detail: HOSPITAL OUTPATIENT SURGERY
Discharge: HOME | End: 2023-11-17
Attending: INTERNAL MEDICINE | Admitting: INTERNAL MEDICINE
Payer: COMMERCIAL

## 2023-11-17 VITALS
TEMPERATURE: 98.1 F | WEIGHT: 276.8 LBS | HEIGHT: 63 IN | BODY MASS INDEX: 49.04 KG/M2 | OXYGEN SATURATION: 100 % | HEART RATE: 56 BPM | DIASTOLIC BLOOD PRESSURE: 65 MMHG | SYSTOLIC BLOOD PRESSURE: 131 MMHG | RESPIRATION RATE: 11 BRPM

## 2023-11-17 DIAGNOSIS — R07.89 ATYPICAL CHEST PAIN: ICD-10-CM

## 2023-11-17 LAB
ALBUMIN SERPL-MCNC: 4 G/DL (ref 3.5–5.2)
ALBUMIN/GLOB SERPL: 1.1 G/DL
ALP SERPL-CCNC: 79 U/L (ref 39–117)
ALT SERPL W P-5'-P-CCNC: 11 U/L (ref 1–33)
ANION GAP SERPL CALCULATED.3IONS-SCNC: 9 MMOL/L (ref 5–15)
AST SERPL-CCNC: 15 U/L (ref 1–32)
BILIRUB SERPL-MCNC: 0.4 MG/DL (ref 0–1.2)
BUN SERPL-MCNC: 23 MG/DL (ref 8–23)
BUN/CREAT SERPL: 16.5 (ref 7–25)
CALCIUM SPEC-SCNC: 9 MG/DL (ref 8.6–10.5)
CHLORIDE SERPL-SCNC: 101 MMOL/L (ref 98–107)
CO2 SERPL-SCNC: 28 MMOL/L (ref 22–29)
CREAT SERPL-MCNC: 1.39 MG/DL (ref 0.57–1)
DEPRECATED RDW RBC AUTO: 42.5 FL (ref 37–54)
EGFRCR SERPLBLD CKD-EPI 2021: 42.5 ML/MIN/1.73
ERYTHROCYTE [DISTWIDTH] IN BLOOD BY AUTOMATED COUNT: 13.2 % (ref 12.3–15.4)
GLOBULIN UR ELPH-MCNC: 3.5 GM/DL
GLUCOSE SERPL-MCNC: 110 MG/DL (ref 65–99)
HCT VFR BLD AUTO: 38.2 % (ref 34–46.6)
HGB BLD-MCNC: 12 G/DL (ref 12–15.9)
MCH RBC QN AUTO: 27.3 PG (ref 26.6–33)
MCHC RBC AUTO-ENTMCNC: 31.4 G/DL (ref 31.5–35.7)
MCV RBC AUTO: 86.8 FL (ref 79–97)
PLATELET # BLD AUTO: 296 10*3/MM3 (ref 140–450)
PMV BLD AUTO: 9.7 FL (ref 6–12)
POTASSIUM SERPL-SCNC: 4.5 MMOL/L (ref 3.5–5.2)
PROT SERPL-MCNC: 7.5 G/DL (ref 6–8.5)
RBC # BLD AUTO: 4.4 10*6/MM3 (ref 3.77–5.28)
SODIUM SERPL-SCNC: 138 MMOL/L (ref 136–145)
WBC NRBC COR # BLD AUTO: 5.28 10*3/MM3 (ref 3.4–10.8)

## 2023-11-17 PROCEDURE — 80053 COMPREHEN METABOLIC PANEL: CPT | Performed by: NURSE PRACTITIONER

## 2023-11-17 PROCEDURE — 85027 COMPLETE CBC AUTOMATED: CPT | Performed by: NURSE PRACTITIONER

## 2023-11-17 PROCEDURE — 25810000003 SODIUM CHLORIDE 0.9 % SOLUTION: Performed by: NURSE PRACTITIONER

## 2023-11-17 RX ORDER — SODIUM CHLORIDE 0.9 % (FLUSH) 0.9 %
10 SYRINGE (ML) INJECTION AS NEEDED
Status: DISCONTINUED | OUTPATIENT
Start: 2023-11-17 | End: 2023-11-17 | Stop reason: HOSPADM

## 2023-11-17 RX ORDER — SODIUM CHLORIDE 0.9 % (FLUSH) 0.9 %
10 SYRINGE (ML) INJECTION EVERY 12 HOURS SCHEDULED
Status: DISCONTINUED | OUTPATIENT
Start: 2023-11-17 | End: 2023-11-17 | Stop reason: HOSPADM

## 2023-11-17 RX ORDER — ONDANSETRON 2 MG/ML
4 INJECTION INTRAMUSCULAR; INTRAVENOUS EVERY 6 HOURS PRN
Status: DISCONTINUED | OUTPATIENT
Start: 2023-11-17 | End: 2023-11-17 | Stop reason: HOSPADM

## 2023-11-17 RX ORDER — ASPIRIN 81 MG/1
81 TABLET ORAL DAILY
Status: DISCONTINUED | OUTPATIENT
Start: 2023-11-18 | End: 2023-11-17 | Stop reason: HOSPADM

## 2023-11-17 RX ORDER — NITROGLYCERIN 0.4 MG/1
0.4 TABLET SUBLINGUAL
Status: DISCONTINUED | OUTPATIENT
Start: 2023-11-17 | End: 2023-11-17 | Stop reason: HOSPADM

## 2023-11-17 RX ORDER — ASPIRIN 325 MG
325 TABLET ORAL ONCE
Status: DISCONTINUED | OUTPATIENT
Start: 2023-11-17 | End: 2023-11-17 | Stop reason: HOSPADM

## 2023-11-17 RX ORDER — SODIUM CHLORIDE 9 MG/ML
40 INJECTION, SOLUTION INTRAVENOUS AS NEEDED
Status: DISCONTINUED | OUTPATIENT
Start: 2023-11-17 | End: 2023-11-17 | Stop reason: HOSPADM

## 2023-11-17 RX ADMIN — SODIUM CHLORIDE 400 ML: 9 INJECTION, SOLUTION INTRAVENOUS at 13:02

## 2023-11-17 NOTE — INTERVAL H&P NOTE
H&P reviewed. The patient was examined and there are no changes to the H&P.      No creatine elevated and GFR 42  Had nephrectomy for RCC    DSE in future   Will be seeing Sean Joyce cath

## 2023-11-28 ENCOUNTER — TELEPHONE (OUTPATIENT)
Dept: SURGERY | Age: 63
End: 2023-11-28

## 2023-11-29 ENCOUNTER — TELEPHONE (OUTPATIENT)
Dept: SURGERY | Age: 63
End: 2023-11-29

## 2023-11-29 NOTE — TELEPHONE ENCOUNTER
Called patient and told her per Rodney Diehl to not do mammogram until she is seen by Rodney Diehl on the 5th.   He will decide what he wants to order pending breast exam.

## 2023-12-28 ENCOUNTER — OFFICE VISIT (OUTPATIENT)
Dept: CARDIOLOGY | Facility: CLINIC | Age: 63
End: 2023-12-28
Payer: COMMERCIAL

## 2023-12-28 ENCOUNTER — LAB (OUTPATIENT)
Dept: LAB | Facility: HOSPITAL | Age: 63
End: 2023-12-28
Payer: COMMERCIAL

## 2023-12-28 VITALS
HEART RATE: 68 BPM | DIASTOLIC BLOOD PRESSURE: 78 MMHG | HEIGHT: 63 IN | OXYGEN SATURATION: 98 % | WEIGHT: 277.6 LBS | RESPIRATION RATE: 18 BRPM | SYSTOLIC BLOOD PRESSURE: 122 MMHG | BODY MASS INDEX: 49.19 KG/M2

## 2023-12-28 DIAGNOSIS — R07.89 ATYPICAL CHEST PAIN: Primary | ICD-10-CM

## 2023-12-28 DIAGNOSIS — I10 PRIMARY HYPERTENSION: ICD-10-CM

## 2023-12-28 DIAGNOSIS — R06.09 DYSPNEA ON EXERTION: ICD-10-CM

## 2023-12-28 DIAGNOSIS — R07.89 ATYPICAL CHEST PAIN: ICD-10-CM

## 2023-12-28 LAB
ANION GAP SERPL CALCULATED.3IONS-SCNC: 8 MMOL/L (ref 5–15)
BUN SERPL-MCNC: 25 MG/DL (ref 8–23)
BUN/CREAT SERPL: 16.4 (ref 7–25)
CALCIUM SPEC-SCNC: 9.2 MG/DL (ref 8.6–10.5)
CHLORIDE SERPL-SCNC: 105 MMOL/L (ref 98–107)
CO2 SERPL-SCNC: 28 MMOL/L (ref 22–29)
CREAT SERPL-MCNC: 1.52 MG/DL (ref 0.57–1)
EGFRCR SERPLBLD CKD-EPI 2021: 38.1 ML/MIN/1.73
GLUCOSE SERPL-MCNC: 86 MG/DL (ref 65–99)
POTASSIUM SERPL-SCNC: 4.7 MMOL/L (ref 3.5–5.2)
SODIUM SERPL-SCNC: 141 MMOL/L (ref 136–145)

## 2023-12-28 PROCEDURE — 36415 COLL VENOUS BLD VENIPUNCTURE: CPT

## 2023-12-28 PROCEDURE — 3078F DIAST BP <80 MM HG: CPT

## 2023-12-28 PROCEDURE — 3074F SYST BP LT 130 MM HG: CPT

## 2023-12-28 PROCEDURE — 99214 OFFICE O/P EST MOD 30 MIN: CPT

## 2023-12-28 PROCEDURE — 80048 BASIC METABOLIC PNL TOTAL CA: CPT

## 2023-12-28 RX ORDER — ESOMEPRAZOLE MAGNESIUM 40 MG/1
CAPSULE, DELAYED RELEASE ORAL
COMMUNITY
Start: 2023-12-07

## 2023-12-28 RX ORDER — AMLODIPINE BESYLATE 10 MG/1
TABLET ORAL
COMMUNITY
Start: 2023-12-14

## 2023-12-28 RX ORDER — CYCLOBENZAPRINE HCL 10 MG
TABLET ORAL
COMMUNITY
Start: 2023-12-20

## 2023-12-28 RX ORDER — NITROGLYCERIN 0.4 MG/1
0.4 TABLET SUBLINGUAL
COMMUNITY
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
## 2024-01-24 ENCOUNTER — TELEPHONE (OUTPATIENT)
Dept: CARDIOLOGY | Facility: CLINIC | Age: 64
End: 2024-01-24

## 2024-01-24 NOTE — TELEPHONE ENCOUNTER
Caller: Patti Gilbert    Relationship to patient: Self    Best call back number: [phone] (home)      Type of visit: 4 WEEK FOLLOW UP     Requested date: WITH IN TIME FRAME AND LATE AFTERNOON    If rescheduling, when is the original appointment: 1/24/24     Additional notes:PATIENT WAS RESCHEDULED FOR 2/21/24 LATE AFTERNOON PER HER REQUEST.PLEASE CALL PATIENT TO LET HER KNOW IF APPOINTMENT OUTSIDE OF TIMEFRAME IS OK.

## 2024-01-29 ENCOUNTER — HOSPITAL ENCOUNTER (OUTPATIENT)
Dept: WOMENS IMAGING | Age: 64
Discharge: HOME | End: 2024-01-29
Payer: MEDICAID

## 2024-01-29 DIAGNOSIS — Z12.31 VISIT FOR SCREENING MAMMOGRAM: ICD-10-CM

## 2024-01-29 PROCEDURE — 77063 BREAST TOMOSYNTHESIS BI: CPT

## 2024-03-06 ENCOUNTER — OFFICE VISIT (OUTPATIENT)
Dept: NEUROLOGY | Age: 64
End: 2024-03-06
Payer: MEDICAID

## 2024-03-06 VITALS
DIASTOLIC BLOOD PRESSURE: 82 MMHG | OXYGEN SATURATION: 98 % | HEIGHT: 63 IN | HEART RATE: 64 BPM | WEIGHT: 280.6 LBS | BODY MASS INDEX: 49.72 KG/M2 | SYSTOLIC BLOOD PRESSURE: 160 MMHG

## 2024-03-06 DIAGNOSIS — M54.2 NECK PAIN: ICD-10-CM

## 2024-03-06 DIAGNOSIS — G56.03 BILATERAL CARPAL TUNNEL SYNDROME: ICD-10-CM

## 2024-03-06 DIAGNOSIS — G43.009 MIGRAINE WITHOUT AURA AND WITHOUT STATUS MIGRAINOSUS, NOT INTRACTABLE: Primary | ICD-10-CM

## 2024-03-06 PROCEDURE — 3079F DIAST BP 80-89 MM HG: CPT | Performed by: NURSE PRACTITIONER

## 2024-03-06 PROCEDURE — G8484 FLU IMMUNIZE NO ADMIN: HCPCS | Performed by: NURSE PRACTITIONER

## 2024-03-06 PROCEDURE — G8427 DOCREV CUR MEDS BY ELIG CLIN: HCPCS | Performed by: NURSE PRACTITIONER

## 2024-03-06 PROCEDURE — 1036F TOBACCO NON-USER: CPT | Performed by: NURSE PRACTITIONER

## 2024-03-06 PROCEDURE — G8417 CALC BMI ABV UP PARAM F/U: HCPCS | Performed by: NURSE PRACTITIONER

## 2024-03-06 PROCEDURE — 3077F SYST BP >= 140 MM HG: CPT | Performed by: NURSE PRACTITIONER

## 2024-03-06 PROCEDURE — 99213 OFFICE O/P EST LOW 20 MIN: CPT | Performed by: NURSE PRACTITIONER

## 2024-03-06 PROCEDURE — 3017F COLORECTAL CA SCREEN DOC REV: CPT | Performed by: NURSE PRACTITIONER

## 2024-03-07 ENCOUNTER — TELEPHONE (OUTPATIENT)
Dept: NEUROLOGY | Age: 64
End: 2024-03-07

## 2024-03-07 RX ORDER — DAPAGLIFLOZIN 10 MG/1
TABLET, FILM COATED ORAL
COMMUNITY
Start: 2024-03-03

## 2024-03-07 NOTE — TELEPHONE ENCOUNTER
Sonja Saxton called  to let Catrina Vaughant know that she is taking the medication  Dapagliflozin/Farxiga 10 mg for her kidneys and diabetes   -FYI Only.    Thank you

## 2024-04-18 NOTE — DISCHARGE INSTRUCTIONS
UPPER EXTREMITY POST-OP INSTRUCTIONS - DR. MALONEY    IMPORTANT PHONE NUMBERS:   For emergencies, please call [phone]   You may reach Dr. Maloney and clinical staff at [phone]- M-F 8:00 am-5:00 pm   After 5pm or on the weekends, please call [phone]   Call immediately if you have any of the following symptoms:     Elevated temperature above 101.5 degrees for more than 48 hours after surgery     Persistent drainage from wound     Severe pain around surgical site    Sling use: The sling is provided for your comfort and to ensure proper healing of your repair following surgery. Please place the abduction pillow with the curved side against your side and the sling on the side of the pillow. Your surgery requires that you wear the sling if noted below.  ____ For comfort. Remove sling 24 hours and begin range of motion exercises  ____ At all times except bathing, dressing, and therapy. Also wear the sling during sleep.  _X__ No sling required    Bathing:  ___No bandages, no restrictions!!  ___You may remove you dressing and shower on the 3rd day after surgery (Ex. Tu surgery, shower on Friday)  ** if you are told to it is ok to remove your dressing and shower, DO NOT SOAK your incisions in a tub.  _X__Keep splint clean, dry, and intact. DO NOT place foreign objects into your splint.    DRIVING:  absolutely no driving while taking pain medication.  This will be discussed at your first postop visit.    Dressings: Keep dressing/splint intact unless instructed otherwise below. SOME DRAINAGE IS NORMAL!     DO NOT touch or apply ointment to the incision.     DO NOT remove the steri-strips over the incisions (if you have steri-strips). They will         generally fall off on their own or can be removed 1 weeksafter surgery.     If you have yellow gauze and it comes off, do not worry about it. Leave them off.    Signs of infection that warrant a phone call to our clinical line:     o Excessive drainage or

## 2024-04-19 ENCOUNTER — HOSPITAL ENCOUNTER (OUTPATIENT)
Dept: PREADMISSION TESTING | Age: 64
Discharge: HOME | End: 2024-04-23
Payer: MEDICAID

## 2024-04-19 VITALS — BODY MASS INDEX: 49.6 KG/M2 | WEIGHT: 280 LBS

## 2024-04-19 PROCEDURE — 93005 ELECTROCARDIOGRAM TRACING: CPT | Performed by: ORTHOPAEDIC SURGERY

## 2024-04-19 RX ORDER — LORATADINE 10 MG/1
10 CAPSULE, LIQUID FILLED ORAL DAILY
COMMUNITY

## 2024-04-19 RX ORDER — METHOCARBAMOL 500 MG/1
500 TABLET, FILM COATED ORAL 3 TIMES DAILY PRN
COMMUNITY

## 2024-04-19 RX ORDER — CLINDAMYCIN PHOSPHATE 900 MG/50ML
900 INJECTION, SOLUTION INTRAVENOUS ONCE
Status: CANCELLED | OUTPATIENT
Start: 2024-04-23

## 2024-04-19 NOTE — DISCHARGE INSTRUCTIONS
PREOPERATIVE GUIDELINES WHEN RECEIVING ANESTHESIA    Do not eat or drink anything after midnight, the night before your surgery. No gum or candy the morning of surgery.  This is extremely important for your safety.    Take a bath (or shower) the night before your surgery and you may brush your teeth the morning of your surgery.    You will be scheduled to arrive at the hospital 2 hours before your surgery, or follow your surgeon's instructions.    Dress comfortably.  Wear loose clothing that will be easy to remove and comfortable for your trip home.    You may wear eyeglasses or contacts but bring your cases with you as they must be remove before your surgery.    Hearing aids and dentures will need to be removed before your surgery.    Do not wear any jewelry, including body jewelry.  All jewelry will need to be removed prior to your surgery.    Do not wear fingernail polish or make-up.    It is best not to bring any valuables with you.    If you are to stay in the hospital overnight, bring your robe, slippers and personal toiletries that you may need.      POSTOPERATIVE GUIDELINES AFTER RECEIVING ANESTHESIA    If you are to go home after your surgery, you will need a responsible adult to drive you home.     You will not be able to take public transportation after your discharge from the Operative Care Unit unless you are accompanied by a        responsible adult.    On returning home, be sure to follow your physician's orders regarding diet, activity and medications.    Remember, surgery with general anesthesia or sedation may leave you sleepy, very tired and with a decreased appetite for 12 to 24 hours.    If you develop any post-surgical complications or problems, call your surgeon or [facility] Emergency Department ([phone]).        The day before surgery you will receive a phone call from the surgery nurse to let you know what time to arrive on the day of surgery. This call will usually be between 2-4 PM. If

## 2024-04-21 LAB
EKG P AXIS: 48 DEGREES
EKG P-R INTERVAL: 182 MS
EKG Q-T INTERVAL: 460 MS
EKG QRS DURATION: 96 MS
EKG QTC CALCULATION (BAZETT): 450 MS
EKG T AXIS: 13 DEGREES

## 2024-04-21 PROCEDURE — 93010 ELECTROCARDIOGRAM REPORT: CPT | Performed by: INTERNAL MEDICINE

## 2024-04-22 PROBLEM — M65.341 TRIGGER FINGER, RIGHT RING FINGER: Status: ACTIVE | Noted: 2024-04-22

## 2024-04-22 NOTE — OP NOTE
Patient Name: Zuleyka  : 1960  MRN: [id number]      [facility]    DATE of SURGERY: 2024    SURGEON: Venkat Tai MD    ASSISTANT: NONE    PREOPERATIVE DIAGNOSIS    1) Right carpal tunnel syndrome.   2) Right ring trigger finger     POSTOPERATIVE DIAGNOSIS    1) Right carpal tunnel syndrome.   2) Right ring trigger finger    PROCEDURE PERFORMED    1) Right carpal tunnel release.    2) Right ring trigger release     IMPLANTS  None.       ANESTHESIA    General endotracheal.       OPERATIVE INDICATIONS    The patient is a 63 y.o. female who presented to my clinic with complaints of numbness and tingling in the hand with clinical exam and neurodiagnostic evidence of carpal tunnel syndrome.  The patient wished to proceed with surgery, understanding the risks, benefits, and alternatives.  Conservative treatment failed to improve symptoms.  The risks include, but are not limited to, that of anesthesia, bleeding, infection, pain, damage to the motor and sensory branch of the median nerve, chronic pillar pain, incomplete resolution of symptoms.       ESTIMATED BLOOD LOSS    Less than 5 mL.       SPECIMENS    None.       DRAINS  None.       COMPLICATIONS    None.       PROCEDURE IN DETAIL    The patient was seen in the preoperative holding room; once again, the informed consent form was reviewed with the patient and signed. The site of surgery was marked with the patients agreement. After being transferred to the operating room, a timeout was performed identifying the correct patient as well as the operative site. Perioperative antibiotics were administered. The tourniquet was then placed on the brachium of the operative extremity. A sterile prep and drape was performed.       RIGHT CARPAL TUNNEL RELEASE:  A longitudinal incision was made at the base of the palm in line with the radial border of the ring finger intersecting Kaplans cardinal line. Soft tissue was dissected in line with the

## 2024-04-23 ENCOUNTER — ANESTHESIA EVENT (OUTPATIENT)
Dept: OPERATING ROOM | Age: 64
End: 2024-04-23
Payer: MEDICAID

## 2024-04-23 ENCOUNTER — ANESTHESIA (OUTPATIENT)
Dept: OPERATING ROOM | Age: 64
End: 2024-04-23
Payer: MEDICAID

## 2024-04-23 ENCOUNTER — HOSPITAL ENCOUNTER (OUTPATIENT)
Age: 64
Setting detail: OUTPATIENT SURGERY
Discharge: HOME | End: 2024-04-23
Attending: ORTHOPAEDIC SURGERY | Admitting: ORTHOPAEDIC SURGERY
Payer: MEDICAID

## 2024-04-23 VITALS
SYSTOLIC BLOOD PRESSURE: 174 MMHG | OXYGEN SATURATION: 93 % | DIASTOLIC BLOOD PRESSURE: 97 MMHG | HEART RATE: 54 BPM | RESPIRATION RATE: 18 BRPM | TEMPERATURE: 97.5 F | HEIGHT: 63 IN | WEIGHT: 182 LBS | BODY MASS INDEX: 32.25 KG/M2

## 2024-04-23 PROCEDURE — 3700000001 HC ADD 15 MINUTES (ANESTHESIA): Performed by: ORTHOPAEDIC SURGERY

## 2024-04-23 PROCEDURE — 2500000003 HC RX 250 WO HCPCS: Performed by: NURSE ANESTHETIST, CERTIFIED REGISTERED

## 2024-04-23 PROCEDURE — 3600000003 HC SURGERY LEVEL 3 BASE: Performed by: ORTHOPAEDIC SURGERY

## 2024-04-23 PROCEDURE — 7100000000 HC PACU RECOVERY - FIRST 15 MIN: Performed by: ORTHOPAEDIC SURGERY

## 2024-04-23 PROCEDURE — 7100000001 HC PACU RECOVERY - ADDTL 15 MIN: Performed by: ORTHOPAEDIC SURGERY

## 2024-04-23 PROCEDURE — 6360000002 HC RX W HCPCS: Performed by: NURSE ANESTHETIST, CERTIFIED REGISTERED

## 2024-04-23 PROCEDURE — A4216 STERILE WATER/SALINE, 10 ML: HCPCS | Performed by: ANESTHESIOLOGY

## 2024-04-23 PROCEDURE — 2500000003 HC RX 250 WO HCPCS: Performed by: ANESTHESIOLOGY

## 2024-04-23 PROCEDURE — 7100000010 HC PHASE II RECOVERY - FIRST 15 MIN: Performed by: ORTHOPAEDIC SURGERY

## 2024-04-23 PROCEDURE — 3700000000 HC ANESTHESIA ATTENDED CARE: Performed by: ORTHOPAEDIC SURGERY

## 2024-04-23 PROCEDURE — 6360000002 HC RX W HCPCS: Performed by: ANESTHESIOLOGY

## 2024-04-23 PROCEDURE — 2580000003 HC RX 258: Performed by: ANESTHESIOLOGY

## 2024-04-23 PROCEDURE — 6360000002 HC RX W HCPCS: Performed by: ORTHOPAEDIC SURGERY

## 2024-04-23 PROCEDURE — 3600000013 HC SURGERY LEVEL 3 ADDTL 15MIN: Performed by: ORTHOPAEDIC SURGERY

## 2024-04-23 PROCEDURE — 2709999900 HC NON-CHARGEABLE SUPPLY: Performed by: ORTHOPAEDIC SURGERY

## 2024-04-23 PROCEDURE — 7100000011 HC PHASE II RECOVERY - ADDTL 15 MIN: Performed by: ORTHOPAEDIC SURGERY

## 2024-04-23 PROCEDURE — 2580000003 HC RX 258: Performed by: NURSE ANESTHETIST, CERTIFIED REGISTERED

## 2024-04-23 RX ORDER — LIDOCAINE HYDROCHLORIDE 10 MG/ML
INJECTION, SOLUTION INFILTRATION; PERINEURAL PRN
Status: DISCONTINUED | OUTPATIENT
Start: 2024-04-23 | End: 2024-04-23 | Stop reason: SDUPTHER

## 2024-04-23 RX ORDER — SODIUM CHLORIDE 0.9 % (FLUSH) 0.9 %
5-40 SYRINGE (ML) INJECTION PRN
Status: DISCONTINUED | OUTPATIENT
Start: 2024-04-23 | End: 2024-04-23 | Stop reason: HOSPADM

## 2024-04-23 RX ORDER — PROPOFOL 10 MG/ML
INJECTION, EMULSION INTRAVENOUS PRN
Status: DISCONTINUED | OUTPATIENT
Start: 2024-04-23 | End: 2024-04-23 | Stop reason: SDUPTHER

## 2024-04-23 RX ORDER — SODIUM CHLORIDE 0.9 % (FLUSH) 0.9 %
5-40 SYRINGE (ML) INJECTION EVERY 12 HOURS SCHEDULED
Status: DISCONTINUED | OUTPATIENT
Start: 2024-04-23 | End: 2024-04-23 | Stop reason: HOSPADM

## 2024-04-23 RX ORDER — ONDANSETRON 2 MG/ML
INJECTION INTRAMUSCULAR; INTRAVENOUS PRN
Status: DISCONTINUED | OUTPATIENT
Start: 2024-04-23 | End: 2024-04-23 | Stop reason: SDUPTHER

## 2024-04-23 RX ORDER — SODIUM CHLORIDE 9 MG/ML
INJECTION, SOLUTION INTRAVENOUS PRN
Status: DISCONTINUED | OUTPATIENT
Start: 2024-04-23 | End: 2024-04-23 | Stop reason: HOSPADM

## 2024-04-23 RX ORDER — HYDROMORPHONE HYDROCHLORIDE 1 MG/ML
0.25 INJECTION, SOLUTION INTRAMUSCULAR; INTRAVENOUS; SUBCUTANEOUS EVERY 5 MIN PRN
Status: DISCONTINUED | OUTPATIENT
Start: 2024-04-23 | End: 2024-04-23 | Stop reason: HOSPADM

## 2024-04-23 RX ORDER — CLINDAMYCIN PHOSPHATE 900 MG/50ML
900 INJECTION, SOLUTION INTRAVENOUS ONCE
Status: COMPLETED | OUTPATIENT
Start: 2024-04-23 | End: 2024-04-23

## 2024-04-23 RX ORDER — SODIUM CHLORIDE, SODIUM LACTATE, POTASSIUM CHLORIDE, CALCIUM CHLORIDE 600; 310; 30; 20 MG/100ML; MG/100ML; MG/100ML; MG/100ML
INJECTION, SOLUTION INTRAVENOUS CONTINUOUS
Status: DISCONTINUED | OUTPATIENT
Start: 2024-04-23 | End: 2024-04-23 | Stop reason: HOSPADM

## 2024-04-23 RX ORDER — MIDAZOLAM HYDROCHLORIDE 1 MG/ML
INJECTION INTRAMUSCULAR; INTRAVENOUS PRN
Status: DISCONTINUED | OUTPATIENT
Start: 2024-04-23 | End: 2024-04-23 | Stop reason: SDUPTHER

## 2024-04-23 RX ORDER — HYDROMORPHONE HYDROCHLORIDE 1 MG/ML
0.5 INJECTION, SOLUTION INTRAMUSCULAR; INTRAVENOUS; SUBCUTANEOUS EVERY 5 MIN PRN
Status: COMPLETED | OUTPATIENT
Start: 2024-04-23 | End: 2024-04-23

## 2024-04-23 RX ORDER — FENTANYL CITRATE 50 UG/ML
INJECTION, SOLUTION INTRAMUSCULAR; INTRAVENOUS PRN
Status: DISCONTINUED | OUTPATIENT
Start: 2024-04-23 | End: 2024-04-23 | Stop reason: SDUPTHER

## 2024-04-23 RX ORDER — SODIUM CHLORIDE, SODIUM LACTATE, POTASSIUM CHLORIDE, CALCIUM CHLORIDE 600; 310; 30; 20 MG/100ML; MG/100ML; MG/100ML; MG/100ML
INJECTION, SOLUTION INTRAVENOUS CONTINUOUS PRN
Status: DISCONTINUED | OUTPATIENT
Start: 2024-04-23 | End: 2024-04-23 | Stop reason: SDUPTHER

## 2024-04-23 RX ORDER — ONDANSETRON 2 MG/ML
4 INJECTION INTRAMUSCULAR; INTRAVENOUS
Status: DISCONTINUED | OUTPATIENT
Start: 2024-04-23 | End: 2024-04-23 | Stop reason: HOSPADM

## 2024-04-23 RX ORDER — NALOXONE HYDROCHLORIDE 0.4 MG/ML
INJECTION, SOLUTION INTRAMUSCULAR; INTRAVENOUS; SUBCUTANEOUS PRN
Status: DISCONTINUED | OUTPATIENT
Start: 2024-04-23 | End: 2024-04-23 | Stop reason: HOSPADM

## 2024-04-23 RX ADMIN — PROPOFOL 200 MG: 10 INJECTION, EMULSION INTRAVENOUS at 10:59

## 2024-04-23 RX ADMIN — CLINDAMYCIN PHOSPHATE 900 MG: 900 INJECTION, SOLUTION INTRAVENOUS at 11:06

## 2024-04-23 RX ADMIN — LIDOCAINE HYDROCHLORIDE 50 MG: 10 INJECTION, SOLUTION INFILTRATION; PERINEURAL at 10:58

## 2024-04-23 RX ADMIN — SODIUM CHLORIDE, SODIUM LACTATE, POTASSIUM CHLORIDE, AND CALCIUM CHLORIDE: 600; 310; 30; 20 INJECTION, SOLUTION INTRAVENOUS at 10:51

## 2024-04-23 RX ADMIN — HYDROMORPHONE HYDROCHLORIDE 0.5 MG: 1 INJECTION, SOLUTION INTRAMUSCULAR; INTRAVENOUS; SUBCUTANEOUS at 11:50

## 2024-04-23 RX ADMIN — SODIUM CHLORIDE, SODIUM LACTATE, POTASSIUM CHLORIDE, AND CALCIUM CHLORIDE: 600; 310; 30; 20 INJECTION, SOLUTION INTRAVENOUS at 09:03

## 2024-04-23 RX ADMIN — HYDROMORPHONE HYDROCHLORIDE 0.5 MG: 1 INJECTION, SOLUTION INTRAMUSCULAR; INTRAVENOUS; SUBCUTANEOUS at 11:45

## 2024-04-23 RX ADMIN — HYDROMORPHONE HYDROCHLORIDE 0.5 MG: 1 INJECTION, SOLUTION INTRAMUSCULAR; INTRAVENOUS; SUBCUTANEOUS at 11:40

## 2024-04-23 RX ADMIN — ONDANSETRON 4 MG: 2 INJECTION INTRAMUSCULAR; INTRAVENOUS at 11:13

## 2024-04-23 RX ADMIN — PROPOFOL 50 MG: 10 INJECTION, EMULSION INTRAVENOUS at 11:10

## 2024-04-23 RX ADMIN — FENTANYL CITRATE 50 MCG: 0.05 INJECTION, SOLUTION INTRAMUSCULAR; INTRAVENOUS at 10:58

## 2024-04-23 RX ADMIN — HYDROMORPHONE HYDROCHLORIDE 0.5 MG: 1 INJECTION, SOLUTION INTRAMUSCULAR; INTRAVENOUS; SUBCUTANEOUS at 11:55

## 2024-04-23 RX ADMIN — FAMOTIDINE 20 MG: 10 INJECTION, SOLUTION INTRAVENOUS at 09:30

## 2024-04-23 RX ADMIN — FENTANYL CITRATE 50 MCG: 0.05 INJECTION, SOLUTION INTRAMUSCULAR; INTRAVENOUS at 11:08

## 2024-04-23 RX ADMIN — MIDAZOLAM 2 MG: 1 INJECTION INTRAMUSCULAR; INTRAVENOUS at 10:51

## 2024-04-23 RX ADMIN — SODIUM CHLORIDE, SODIUM LACTATE, POTASSIUM CHLORIDE, AND CALCIUM CHLORIDE: 600; 310; 30; 20 INJECTION, SOLUTION INTRAVENOUS at 11:24

## 2024-04-23 ASSESSMENT — PAIN SCALES - GENERAL
PAINLEVEL_OUTOF10: 9
PAINLEVEL_OUTOF10: 3
PAINLEVEL_OUTOF10: 8
PAINLEVEL_OUTOF10: 10
PAINLEVEL_OUTOF10: 7

## 2024-04-23 ASSESSMENT — PAIN - FUNCTIONAL ASSESSMENT: PAIN_FUNCTIONAL_ASSESSMENT: 0-10

## 2024-04-23 ASSESSMENT — LIFESTYLE VARIABLES: SMOKING_STATUS: 0

## 2024-04-23 NOTE — ANESTHESIA POSTPROCEDURE EVALUATION
Department of Anesthesiology  Postprocedure Note    Patient: Sonja Shabazz  MRN: [id number]  YOB: 1960  Date of evaluation: 4/23/2024    Procedure Summary       Date: 04/23/24 Room / Location: [address]    Anesthesia Start: 1051 Anesthesia Stop: 1135    Procedures:       RIGHT CARPAL TUNNEL RELEASE (Right: Hand)      RIGHT RING TRIGGER FINGER RELEASE (Right: Ring Finger) Diagnosis:       Carpal tunnel syndrome, right      Trigger finger, right ring finger      (Carpal tunnel syndrome, right [G56.01])      (Trigger finger, right ring finger [M65.341])    Surgeons: Venkat Tai MD Responsible Provider: Wily Valera APRN - CRNA    Anesthesia Type: general ASA Status: 3            Anesthesia Type: No value filed.    Kofi Phase I: Kofi Score: 8    Kofi Phase II:      Anesthesia Post Evaluation    Patient location during evaluation: PACU  Patient participation: complete - patient participated  Level of consciousness: sleepy but conscious  Pain score: 0  Airway patency: patent  Nausea & Vomiting: no nausea and no vomiting  Cardiovascular status: hemodynamically stable and blood pressure returned to baseline  Respiratory status: acceptable, spontaneous ventilation, nonlabored ventilation and room air  Hydration status: stable  Comments: BP (!) 152/74   Pulse 65   Temp 98.4 °F (36.9 °C)   Resp 19   Ht 1.6 m (5' 3\")   Wt 82.6 kg (182 lb)   SpO2 97%   BMI 32.24 kg/m²     Pain management: adequate    No notable events documented.

## 2024-04-23 NOTE — H&P
Pt Name: Sonja Shabazz  MRN: [id number]  YOB: 1960  Date of evaluation: 4/23/2024    H&P including current review of systems was updated in the paper chart and/or the document previously scanned into the record.  There have been no significant changes or new problems since the original evaluation.  The patient's problems continue and indications for contemplated procedure have not changed.    Electronically signed by Venkat Tai MD on 4/23/2024 at 9:26 AM

## 2024-04-23 NOTE — ANESTHESIA PRE PROCEDURE
Past Surgical History:        Procedure Laterality Date   • BREAST BIOPSY Right 2018    intraductal papilloma   • DILATION AND CURETTAGE OF UTERUS      after miscarriage   • JOINT REPLACEMENT Bilateral     jose guadalupe tk   • ROTATOR CUFF REPAIR Right    • TOTAL NEPHRECTOMY Right     in [place]       Social History:    Social History     Tobacco Use   • Smoking status: Never   • Smokeless tobacco: Never   Substance Use Topics   • Alcohol use: Yes     Comment: occ                                Counseling given: Not Answered      Vital Signs (Current):   Vitals:    04/23/24 0857   BP: (!) 140/72   Pulse: 63   Resp: 18   Temp: 96.9 °F (36.1 °C)   TempSrc: Temporal   SpO2: 96%   Weight: 82.6 kg (182 lb)   Height: 1.6 m (5' 3\")                                              BP Readings from Last 3 Encounters:   04/23/24 (!) 140/72   03/06/24 (!) 160/82   12/13/23 132/76       NPO Status: Time of last liquid consumption: 2100                        Time of last solid consumption: 2100                        Date of last liquid consumption: 04/22/24                        Date of last solid food consumption: 04/22/24    BMI:   Wt Readings from Last 3 Encounters:   04/23/24 82.6 kg (182 lb)   04/19/24 127 kg (280 lb)   03/06/24 127.3 kg (280 lb 9.6 oz)     Body mass index is 32.24 kg/m².    CBC: No results found for: \"WBC\", \"RBC\", \"HGB\", \"HCT\", \"MCV\", \"RDW\", \"PLT\"    CMP: No results found for: \"NA\", \"K\", \"CL\", \"CO2\", \"BUN\", \"CREATININE\", \"GFRAA\", \"AGRATIO\", \"LABGLOM\", \"GLUCOSE\", \"GLU\", \"PROT\", \"CALCIUM\", \"BILITOT\", \"ALKPHOS\", \"AST\", \"ALT\"    POC Tests: No results for input(s): \"POCGLU\", \"POCNA\", \"POCK\", \"POCCL\", \"POCBUN\", \"POCHEMO\", \"POCHCT\" in the last 72 hours.    Coags: No results found for: \"PROTIME\", \"INR\", \"APTT\"    HCG (If Applicable): No results found for: \"PREGTESTUR\", \"PREGSERUM\", \"HCG\", \"HCGQUANT\"     ABGs: No results found for: \"PHART\", \"PO2ART\", \"DAS1HAE\", \"GYN8ETY\", \"BEART\", \"K3CPYBZO\"     Type & Screen (If

## 2024-04-23 NOTE — BRIEF OP NOTE
Brief Postoperative Note      Patient: Sonja Shabazz  YOB: 1960  MRN: [id number]    Date of Procedure: 4/23/2024    Pre-Op Diagnosis Codes:     * Carpal tunnel syndrome, right [G56.01]     * Trigger finger, right ring finger [M65.341]    Post-Op Diagnosis: Same       Procedure(s):  RIGHT CARPAL TUNNEL RELEASE  RIGHT RING TRIGGER FINGER RELEASE    Surgeon(s):  Venkat Tai MD    Assistant:  * No surgical staff found *    Anesthesia: General    Estimated Blood Loss (mL): Minimal    Complications: None    Specimens:   * No specimens in log *    Implants:  * No implants in log *      Drains: * No LDAs found *    Findings:  Infection Present At Time Of Surgery (PATOS) (choose all levels that have infection present):  No infection present  Other Findings: see op note    Electronically signed by Venkat Tai MD on 4/23/2024 at 11:26 AM

## 2024-06-28 ENCOUNTER — OFFICE VISIT (OUTPATIENT)
Dept: CARDIOLOGY | Facility: CLINIC | Age: 64
End: 2024-06-28
Payer: MEDICARE

## 2024-06-28 VITALS
WEIGHT: 283 LBS | BODY MASS INDEX: 50.14 KG/M2 | HEART RATE: 64 BPM | HEIGHT: 63 IN | SYSTOLIC BLOOD PRESSURE: 155 MMHG | DIASTOLIC BLOOD PRESSURE: 81 MMHG

## 2024-06-28 DIAGNOSIS — Z87.74 HX OF PERCUTANEOUS TRANSCATHETER CLOSURE OF CONGENITAL ASD: ICD-10-CM

## 2024-06-28 DIAGNOSIS — I47.10 PAROXYSMAL SVT (SUPRAVENTRICULAR TACHYCARDIA): Primary | ICD-10-CM

## 2024-06-28 DIAGNOSIS — I10 PRIMARY HYPERTENSION: ICD-10-CM

## 2024-06-28 DIAGNOSIS — E11.9 TYPE 2 DIABETES MELLITUS WITHOUT COMPLICATION, WITHOUT LONG-TERM CURRENT USE OF INSULIN: ICD-10-CM

## 2024-06-28 DIAGNOSIS — R07.9 CHEST PAIN IN ADULT: ICD-10-CM

## 2024-06-28 DIAGNOSIS — R06.09 DOE (DYSPNEA ON EXERTION): ICD-10-CM

## 2024-06-28 DIAGNOSIS — R07.89 ATYPICAL CHEST PAIN: ICD-10-CM

## 2024-06-28 DIAGNOSIS — R00.2 PALPITATIONS: ICD-10-CM

## 2024-06-28 NOTE — PROGRESS NOTES
Patti Gilbert  [id number]  1960  64 y.o.  female    Referring Provider: Jocelin Thakkar APRN    Reason for  Visit:  Here for routine follow up     Subjective     Overall feels the same   No new events or complaints since last visit   Overall the patient feels no major change from baseline symptoms   Similar symptoms as during last visit       Chest pain both with exertion as well as at rest.  Feels episodes of chest pain occur no more often with exertion  Moderate substernal,   Pressure like   Chest pain non pleuritic  Chest pain non positional and non gustatory   Lasts less than 5 minutes     Occurs once or twice a week on the average but can be variable in frequency  Associated diaphoresis    No associated nausea  No radiation    Relieved with rest or spontaneously  Not positional    No change with intake of food or antacids  No change with breathing  Mild to moderate associated dyspnea    No similar chest pain episodes in the past    No bleeding, excessive bruising, gait instability or fall risks    chest pain persists      Prior visit preoperative cardiovascular clearance under general anesthesia for right renal cell ca    S/P uneventful surgery and recovered well  Surgical wound healed very well.        Moderate b/l pedal edema     Intermittent palpitations, once every several days to several weeks lasting for less than 20 minutes  Associated symptoms of dizziness, weakness and shortness of breath    Feels may pass out at times  No syncope       History of present illness:  Patti Gilbert is a 64 y.o. yo female with Type 2 diabetes mellitus  who presents today for   Chief Complaint   Patient presents with    Follow-up     6 month follow up    .    History  Past Medical History:   Diagnosis Date    Asthma     Atrial septal defect     [id number]    Breast cancer     Chronic kidney disease Few months ago    Have a cancerous tumor on my right kidney    COPD (chronic obstructive pulmonary disease)     Diabetes  mellitus     H/O cardiac catheterization     Heart valve disease Years ago    Dr. Dickson    History of left fractured kneecap     Hypertension     Mini Stroke     Morbid obesity     Myocardial infarction     Recretional Drug use     Right knee injury     Sleep apnea Months ago    Dr. Summers referred me to continue treatment   ,   Past Surgical History:   Procedure Laterality Date    AORTIC VALVE SURGERY  Years ago    Dr Dickson    BREAST MASS EXCISION      2010    CARDIAC CATHETERIZATION      CARDIAC SURGERY      Closure of ASD    CARDIAC VALVE REPLACEMENT      GANGLION CYST EXCISION      KIDNEY SURGERY Right 10/05/2023    [facility]    KNEE ARTHROSCOPY Right 01/13/2009    ROTATOR CUFF REPAIR      TUBAL ABDOMINAL LIGATION Bilateral    ,   Family History   Problem Relation Age of Onset    Alzheimer's disease Mother     Cancer Mother     Heart disease Father     Cancer Father     Hypertension Father     Asthma Sister     Hypertension Sister     Heart failure Brother     Diabetes Sister     Heart attack Brother     Diabetes Brother    ,   Social History     Tobacco Use    Smoking status: Never     Passive exposure: Never    Smokeless tobacco: Never   Vaping Use    Vaping status: Never Used   Substance Use Topics    Alcohol use: Yes     Alcohol/week: 2.0 standard drinks of alcohol     Types: 2 Cans of beer per week     Comment: Occasionally    Drug use: Yes     Types: Oxycodone   ,     Medications  Current Outpatient Medications   Medication Sig Dispense Refill    albuterol sulfate  (90 Base) MCG/ACT inhaler Inhale 2 puffs Every 6 (Six) Hours As Needed for Wheezing or Shortness of Air.      amLODIPine (NORVASC) 10 MG tablet Only taking 1/2      aspirin 81 MG EC tablet Take 1 tablet by mouth Daily.      atenolol (TENORMIN) 25 MG tablet Take 1 tablet by mouth Every Night.      cetirizine (zyrTEC) 10 MG tablet Take 1 tablet by mouth Daily As Needed for Allergies.      cyclobenzaprine (FLEXERIL) 10 MG tablet        Dapagliflozin Propanediol (FARXIGA PO) Take  by mouth.      esomeprazole (nexIUM) 40 MG capsule       furosemide (LASIX) 40 MG tablet Take 1 tablet by mouth As Needed.      HYDROcodone-acetaminophen (NORCO) 7.5-325 MG per tablet Take 1 tablet by mouth Every 6 (Six) Hours As Needed for Moderate Pain.      lisinopril (PRINIVIL,ZESTRIL) 40 MG tablet Take 1 tablet by mouth Daily.      montelukast (SINGULAIR) 10 MG tablet Take 1 tablet by mouth Daily As Needed.      nitroglycerin (NITROSTAT) 0.4 MG SL tablet Place 1 tablet under the tongue.       No current facility-administered medications for this visit.       Allergies:  Cefdinir, Empagliflozin, Levofloxacin, Rosuvastatin, Acetaminophen, and Hydrocodone-acetaminophen    Review of Systems  Review of Systems   Constitutional: Negative. Positive for malaise/fatigue.   HENT: Negative.     Eyes: Negative.    Cardiovascular:  Positive for chest pain and dyspnea on exertion. Negative for claudication, cyanosis, irregular heartbeat, leg swelling, near-syncope, orthopnea, palpitations, paroxysmal nocturnal dyspnea and syncope.   Respiratory: Negative.     Endocrine: Negative.    Hematologic/Lymphatic: Negative.    Skin: Negative.    Musculoskeletal:  Positive for arthritis, back pain and joint pain.   Gastrointestinal:  Negative for anorexia.   Genitourinary: Negative.    Neurological: Negative.    Psychiatric/Behavioral: Negative.         Narrative & Impression    Procedure: CT angiography of the coronary arteries with intravenous contrast including 3D image postprocessing including evaluation of current cardiac structure and morphology with coronary artery calcium scoring        ______________________________________________________________________  Acquisition mode:  Prospective ECG triggering   Contrast type and volume:  Isovue   Medication used:  Nitroglycerin beta-blocker therapy when indicated   complications: None immediate  Image quality: Satisfactory ,   Some  misregistration artifacts noted. This decreases overall accuracy of the test  Signal to noise.  Satisfactory  Scanner and settings:Siemens force dual source CT scanner using iterative reconstruction and prospective gating  Preliminary  study was obtained followed by coronary artery calcium scoring procedure.    Following administration of contrast collimated images were obtained through the coronary arteries.    Data was transferred offline for 3D reconstruction including curved multiplanar reformatting and multiplanar imaging.  Indication: Low to moderate suspicion of coronary artery disease   ALARA follow to minimize radiation  Study Notes        Lopez Oneil on 4/7/2023  3:53 PM CDT            ______________________________________________________________________     Coronary angiography:      Left Main: The left main is a normal caliber vessel with a normal take off from the left coronary cusp that   bifurcates to form a left anterior descending artery and a left circumflex artery.     Left anterior descending artery: Arises normally from the left main coronary artery and gives off septal and diagonal branches.     Left circumflex artery: Arises normally from the left main coronary artery and gives off obtuse marginal branches.     Right coronary artery: Arises normally from the right coronary cusp.  It bifurcates into the right posterior descending coronary artery and the right posterior lateral arteries.  Right coronary artery is dominant.     Right atrium: Normal size  No filling defects noted     Right ventricle: Normal size   No filling defects noted.     Left Atrium:  Left atrial size is normal in size  No left atrial appendage filling defect  Has interatrial septal closure device  Left Ventricle: The ventricular cavity size is within normal limits. There are no stigmata of prior infarction. There is no abnormal filling defect.      Coronary angiography:  See below     Pulmonary arteries:  Normal in size without proximal filling defect      Pulmonary veins: Normal pulmonary venous drainage. There were four  Noted pulmonary veins, two on the right and two on the left.      Pericardium:  Normal thickness with no significant effusion or calcium present.       Cardiac valves:  There is no thickening or calcifications in the aortic and mitral valves.      Aorta: Normal caliber.     Extra-cardiac findings: Arthritic changes of the spine noted  There are no significant extra-cardiac findings in the available limited views of the lungs, mediastinum, and abdomen.    No unusual findings for age        ______________________________________________________________________        Cardiac CT angiography  4/7/2023     Impression:      Total calcium score (using BETTS calcium score calculator): 0       Normal left main coronary artery     Proximal and mid left anterior descending coronary artery does not have any significant disease  Distal left anterior descending coronary artery is not well visualized  Normal diagonal branch  Normal proximal mid and mid to distal left circumflex coronary artery  The very distal portion is not well visualized of the left circumflex coronary artery  Right coronary artery does not have any obvious significant disease  Right posterior descending coronary artery and right posterior lateral artery appears to be normal  Due to superobesity as well as due to misregistration artifacts overall image quality is subpar          ___________________________________________________________________________     Recommendations:     Ongoing risk factor modifications  Further evaluation if ongoing chest pain or high risk of suspicion of significant ischemic heart disease      Results for orders placed during the hospital encounter of 10/23/19    Adult Stress Echo W/ Cont or Stress Agent if Necessary Per Protocol    Interpretation Summary  · Low risk for ischemia       Objective     Physical Exam:  BP  "155/81   Pulse 64   Ht 160 cm (63\")   Wt 128 kg (283 lb)   LMP  (LMP Unknown)   BMI 50.13 kg/m²     Physical Exam  Constitutional:       Appearance: She is well-developed.   HENT:      Head: Normocephalic.   Neck:      Vascular: Normal carotid pulses. No carotid bruit or JVD.      Trachea: No tracheal tenderness or tracheal deviation.   Cardiovascular:      Rate and Rhythm: Regular rhythm.      Pulses: Normal pulses.      Heart sounds: Normal heart sounds.   Pulmonary:      Effort: Pulmonary effort is normal.      Breath sounds: No stridor.   Abdominal:      Palpations: Abdomen is soft.   Musculoskeletal:      Cervical back: No edema.   Skin:     General: Skin is warm.   Neurological:      Mental Status: She is alert.      Cranial Nerves: No cranial nerve deficit.      Sensory: No sensory deficit.   Psychiatric:         Speech: Speech normal.         Behavior: Behavior normal.         Results Review:    Results for orders placed during the hospital encounter of 10/23/19    Adult Stress Echo W/ Cont or Stress Agent if Necessary Per Protocol    Interpretation Summary  · Low risk for ischemia        ____________________________________________________________________________________________________________________________________________  Health maintenance and recommendations    Low salt/ HTN/ Heart healthy carbohydrate restricted cardiac diet   The patient is advised to reduce or avoid caffeine or other cardiac stimulants.   Minimize or avoid  NSAID-type medications      Monitor for any signs of bleeding including red or dark stools. Fall precautions.   Advised staying uptodate with immunizations per established standard guidelines.    Offered to give patient  a copy of my notes     Questions were encouraged, asked and answered to the patient's  understanding and satisfaction. Questions if any regarding current medications and side effects, need for refills and importance of compliance to medications " stressed.    Reviewed available prior notes, consults, prior visits, laboratory findings, radiology and cardiology relevant reports. Updated chart as applicable. I have reviewed the patient's medical history in detail and updated the computerized patient record as relevant.      Updated patient regarding any new or relevant abnormalities on review of records or any new findings on physical exam. Mentioned to patient about purpose of visit and desirable health short and long term goals and objectives.    Primary to monitor CBC CMP Lipid panel and TSH as applicable    ___________________________________________________________________________________________________________________________________________     ECG 12 Lead    Date/Time: 6/28/2024 11:15 AM  Performed by: Mark Summers MD    Authorized by: Mark Summers MD  Comparison: compared with previous ECG from 6/28/2024  Comparison to previous ECG: Ventricular rate changed from  57  to 59   beats per minute      Rhythm: sinus bradycardia  Rate: bradycardic  Conduction: conduction normal  ST Segments: ST segments normal  T Waves: T waves normal  QRS axis: normal  Other: no other findings    Clinical impression: normal ECG          Assessment & Plan   Diagnoses and all orders for this visit:    1. Paroxysmal SVT (supraventricular tachycardia) (Primary)    2. Palpitations  -     Holter Monitor - 72 Hour Up To 15 Days; Future    3. Primary hypertension    4. Type 2 diabetes mellitus without complication, without long-term current use of insulin    5. Hx of percutaneous transcatheter closure of congenital ASD    6. Atypical chest pain    7. Chest pain in adult  -     Adult Transthoracic Echo Complete w/ Color, Spectral and Contrast if necessary per protocol; Future  -     Adult Stress Echo W/ Cont or Stress Agent if Necessary Per Protocol; Future    8. MUHAMMAD (dyspnea on exertion)  -     Adult Transthoracic Echo Complete w/ Color, Spectral and Contrast if necessary per  protocol; Future  -     Adult Stress Echo W/ Cont or Stress Agent if Necessary Per Protocol; Future          Plan    Patient expressed understanding  Encouraged and answered all questions   Discussed with the patient and all questioned fully answered. She will call me if any problems arise.   Discussed results of prior testing with patient   as well electrocardiogram from today       Orders Placed This Encounter   Procedures    Holter Monitor - 72 Hour Up To 15 Days     Standing Status:   Future     Standing Expiration Date:   6/28/2025     Order Specific Question:   Reason for exam?     Answer:   Palpitations     Order Specific Question:   How many days is the patient to wear the monitor?     Answer:   14     Order Specific Question:   Release to patient     Answer:   Routine Release [1400000002]    Adult Transthoracic Echo Complete w/ Color, Spectral and Contrast if necessary per protocol     Standing Status:   Future     Standing Expiration Date:   6/28/2025     Scheduling Instructions:      Myocardial strain to be performed       Use newer echo machine     Order Specific Question:   Reason for exam?     Answer:   Dyspnea     Order Specific Question:   Reason for exam?     Answer:   Chest Pain     Order Specific Question:   Release to patient     Answer:   Routine Release [1400000002]    Adult Stress Echo W/ Cont or Stress Agent if Necessary Per Protocol     Standing Status:   Future     Standing Expiration Date:   6/28/2025     Order Specific Question:   What stress agent will be used?     Answer:   Dobutamine     Order Specific Question:   Difficulty walking criteria?     Answer:   Musculoskeletal (hips, knees, feet, back, amputee)     Order Specific Question:   Reason for exam?     Answer:   Chest Pain     Order Specific Question:   Release to patient     Answer:   Routine Release [1400000002]        obstructive sleep apnea not started on CPAP   Recommend NIPPV daily.     Keep A1c less than 7 Primary to  monitor  Keep LDL below 70 mg/dl. Monitor liver and renal functions.   Monitor CBC, CMP, TSH (as indicated) and Lipid Panel by primary           Return in about 6 weeks (around 8/9/2024).

## 2024-07-09 ENCOUNTER — TELEPHONE (OUTPATIENT)
Dept: CARDIOLOGY | Facility: CLINIC | Age: 64
End: 2024-07-09

## 2024-07-09 NOTE — TELEPHONE ENCOUNTER
Hub staff attempted to follow warm transfer process and was unsuccessful     Caller: Patti Gilbert    Relationship to patient: Self    Best call back number: [phone]    Patient is needing: PATIENT NEEDING TO RESCHEDULE HER HOLTER MONITOR AND ECHO STRESS TEST THAT WERE FOR TODAY TO ANOTHER DAY

## 2024-07-24 ENCOUNTER — HOSPITAL ENCOUNTER (OUTPATIENT)
Dept: CARDIOLOGY | Facility: HOSPITAL | Age: 64
Discharge: HOME | End: 2024-07-24
Payer: MEDICARE

## 2024-07-24 DIAGNOSIS — R00.2 PALPITATIONS: ICD-10-CM

## 2024-07-24 DIAGNOSIS — R07.9 CHEST PAIN IN ADULT: ICD-10-CM

## 2024-07-24 DIAGNOSIS — R06.09 DOE (DYSPNEA ON EXERTION): ICD-10-CM

## 2024-07-24 PROCEDURE — 93017 CV STRESS TEST TRACING ONLY: CPT

## 2024-07-24 PROCEDURE — 25510000001 PERFLUTREN 6.52 MG/ML SUSPENSION: Performed by: INTERNAL MEDICINE

## 2024-07-24 PROCEDURE — 93350 STRESS TTE ONLY: CPT

## 2024-07-24 PROCEDURE — 25010000002 DOBUTAMINE PER 250 MG: Performed by: INTERNAL MEDICINE

## 2024-07-24 PROCEDURE — 93246 EXT ECG>7D<15D RECORDING: CPT

## 2024-07-24 RX ORDER — DOBUTAMINE HYDROCHLORIDE 100 MG/100ML
10-50 INJECTION INTRAVENOUS CONTINUOUS
Status: DISCONTINUED | OUTPATIENT
Start: 2024-07-24 | End: 2024-07-25 | Stop reason: HOSPADM

## 2024-07-24 RX ORDER — METOPROLOL TARTRATE 1 MG/ML
5 INJECTION, SOLUTION INTRAVENOUS ONCE
Status: DISCONTINUED | OUTPATIENT
Start: 2024-07-24 | End: 2024-07-25 | Stop reason: HOSPADM

## 2024-07-24 RX ADMIN — PERFLUTREN 8.48 MG: 6.52 INJECTION, SUSPENSION INTRAVENOUS at 11:01

## 2024-07-24 RX ADMIN — DOBUTAMINE HYDROCHLORIDE 10 MCG/KG/MIN: 100 INJECTION INTRAVENOUS at 11:15

## 2024-07-25 LAB
BH CV STRESS ATROPINE STAGE 5: 2
BH CV STRESS BP STAGE 1: NORMAL
BH CV STRESS BP STAGE 2: NORMAL
BH CV STRESS BP STAGE 3: NORMAL
BH CV STRESS BP STAGE 4: NORMAL
BH CV STRESS BP STAGE 5: NORMAL
BH CV STRESS DOSE DOBUTAMINE STAGE 1: 10
BH CV STRESS DOSE DOBUTAMINE STAGE 2: 20
BH CV STRESS DOSE DOBUTAMINE STAGE 3: 30
BH CV STRESS DOSE DOBUTAMINE STAGE 4: 40
BH CV STRESS DOSE DOBUTAMINE STAGE 5: 50
BH CV STRESS DURATION MIN STAGE 1: 3
BH CV STRESS DURATION MIN STAGE 2: 3
BH CV STRESS DURATION MIN STAGE 3: 3
BH CV STRESS DURATION MIN STAGE 4: 3
BH CV STRESS DURATION MIN STAGE 5: 2
BH CV STRESS DURATION SEC STAGE 1: 0
BH CV STRESS DURATION SEC STAGE 2: 0
BH CV STRESS DURATION SEC STAGE 3: 0
BH CV STRESS DURATION SEC STAGE 5: 31
BH CV STRESS ECHO POST STRESS EJECTION FRACTION EF: 60 %
BH CV STRESS HR STAGE 1: 61
BH CV STRESS HR STAGE 2: 74
BH CV STRESS HR STAGE 3: 101
BH CV STRESS HR STAGE 4: 130
BH CV STRESS HR STAGE 5: 133
BH CV STRESS PROTOCOL 1: NORMAL
BH CV STRESS RECOVERY BP: NORMAL MMHG
BH CV STRESS RECOVERY HR: 76 BPM
BH CV STRESS STAGE 1: 1
BH CV STRESS STAGE 2: 2
BH CV STRESS STAGE 3: 3
BH CV STRESS STAGE 4: 4
BH CV STRESS STAGE 5: 5
MAXIMAL PREDICTED HEART RATE: 155 BPM
PERCENT MAX PREDICTED HR: 85.81 %
STRESS BASELINE BP: NORMAL MMHG
STRESS BASELINE HR: 55 BPM
STRESS PERCENT HR: 101 %
STRESS POST EXERCISE DUR MIN: 14 MIN
STRESS POST EXERCISE DUR SEC: 31 SEC
STRESS POST PEAK BP: NORMAL MMHG
STRESS POST PEAK HR: 133 BPM
STRESS TARGET HR: 132 BPM

## 2024-08-09 ENCOUNTER — HOSPITAL ENCOUNTER (OUTPATIENT)
Dept: CARDIOLOGY | Facility: HOSPITAL | Age: 64
Discharge: HOME | End: 2024-08-09
Payer: MEDICARE

## 2024-08-09 VITALS
HEIGHT: 63 IN | SYSTOLIC BLOOD PRESSURE: 155 MMHG | DIASTOLIC BLOOD PRESSURE: 81 MMHG | BODY MASS INDEX: 50.14 KG/M2 | WEIGHT: 283 LBS

## 2024-08-09 DIAGNOSIS — R06.09 DOE (DYSPNEA ON EXERTION): ICD-10-CM

## 2024-08-09 DIAGNOSIS — R07.9 CHEST PAIN IN ADULT: ICD-10-CM

## 2024-08-09 LAB
BH CV ECHO LEFT VENTRICLE GLOBAL LONGITUDINAL STRAIN: -17.9 %
BH CV ECHO MEAS - AO MAX PG: 9.9 MMHG
BH CV ECHO MEAS - AO MEAN PG: 6 MMHG
BH CV ECHO MEAS - AO ROOT DIAM: 2.7 CM
BH CV ECHO MEAS - AO V2 MAX: 157 CM/SEC
BH CV ECHO MEAS - AO V2 VTI: 40.2 CM
BH CV ECHO MEAS - AVA(I,D): 2.33 CM2
BH CV ECHO MEAS - EDV(CUBED): 187.1 ML
BH CV ECHO MEAS - EDV(MOD-SP2): 86 ML
BH CV ECHO MEAS - EDV(MOD-SP4): 121 ML
BH CV ECHO MEAS - EF(MOD-BP): 55 %
BH CV ECHO MEAS - EF(MOD-SP2): 61.5 %
BH CV ECHO MEAS - EF(MOD-SP4): 47.9 %
BH CV ECHO MEAS - ESV(CUBED): 72.5 ML
BH CV ECHO MEAS - ESV(MOD-SP2): 33.1 ML
BH CV ECHO MEAS - ESV(MOD-SP4): 63.1 ML
BH CV ECHO MEAS - FS: 27.1 %
BH CV ECHO MEAS - IVS/LVPW: 0.91 CM
BH CV ECHO MEAS - IVSD: 0.78 CM
BH CV ECHO MEAS - LA DIMENSION: 3.8 CM
BH CV ECHO MEAS - LAT PEAK E' VEL: 12.4 CM/SEC
BH CV ECHO MEAS - LV DIASTOLIC VOL/BSA (35-75): 54 CM2
BH CV ECHO MEAS - LV MASS(C)D: 175.5 GRAMS
BH CV ECHO MEAS - LV MAX PG: 4.4 MMHG
BH CV ECHO MEAS - LV MEAN PG: 2 MMHG
BH CV ECHO MEAS - LV SYSTOLIC VOL/BSA (12-30): 28.2 CM2
BH CV ECHO MEAS - LV V1 MAX: 105 CM/SEC
BH CV ECHO MEAS - LV V1 VTI: 27 CM
BH CV ECHO MEAS - LVIDD: 5.7 CM
BH CV ECHO MEAS - LVIDS: 4.2 CM
BH CV ECHO MEAS - LVOT AREA: 3.5 CM2
BH CV ECHO MEAS - LVOT DIAM: 2.1 CM
BH CV ECHO MEAS - LVPWD: 0.85 CM
BH CV ECHO MEAS - MED PEAK E' VEL: 8.2 CM/SEC
BH CV ECHO MEAS - MV A MAX VEL: 99 CM/SEC
BH CV ECHO MEAS - MV DEC TIME: 0.2 SEC
BH CV ECHO MEAS - MV E MAX VEL: 76.5 CM/SEC
BH CV ECHO MEAS - MV E/A: 0.77
BH CV ECHO MEAS - SV(LVOT): 93.5 ML
BH CV ECHO MEAS - SV(MOD-SP2): 52.9 ML
BH CV ECHO MEAS - SV(MOD-SP4): 57.9 ML
BH CV ECHO MEAS - SVI(LVOT): 41.7 ML/M2
BH CV ECHO MEAS - SVI(MOD-SP2): 23.6 ML/M2
BH CV ECHO MEAS - SVI(MOD-SP4): 25.8 ML/M2
BH CV ECHO MEASUREMENTS AVERAGE E/E' RATIO: 7.43
BH CV XLRA - RV BASE: 3.9 CM
BH CV XLRA - RV LENGTH: 6.2 CM
BH CV XLRA - RV MID: 2.6 CM
LEFT ATRIUM VOLUME INDEX: 25.7 ML/M2
LEFT ATRIUM VOLUME: 57.6 ML

## 2024-08-09 PROCEDURE — 93306 TTE W/DOPPLER COMPLETE: CPT

## 2024-08-09 PROCEDURE — 93356 MYOCRD STRAIN IMG SPCKL TRCK: CPT

## 2024-08-16 LAB
BH CV ECHO LEFT VENTRICLE GLOBAL LONGITUDINAL STRAIN: -17.9 %
BH CV ECHO MEAS - AO MAX PG: 9.9 MMHG
BH CV ECHO MEAS - AO MEAN PG: 6 MMHG
BH CV ECHO MEAS - AO ROOT DIAM: 2.7 CM
BH CV ECHO MEAS - AO V2 MAX: 157 CM/SEC
BH CV ECHO MEAS - AO V2 VTI: 40.2 CM
BH CV ECHO MEAS - AVA(I,D): 2.33 CM2
BH CV ECHO MEAS - EDV(CUBED): 187.1 ML
BH CV ECHO MEAS - EDV(MOD-SP2): 86 ML
BH CV ECHO MEAS - EDV(MOD-SP4): 121 ML
BH CV ECHO MEAS - EF(MOD-BP): 55 %
BH CV ECHO MEAS - EF(MOD-SP2): 61.5 %
BH CV ECHO MEAS - EF(MOD-SP4): 47.9 %
BH CV ECHO MEAS - ESV(CUBED): 72.5 ML
BH CV ECHO MEAS - ESV(MOD-SP2): 33.1 ML
BH CV ECHO MEAS - ESV(MOD-SP4): 63.1 ML
BH CV ECHO MEAS - FS: 27.1 %
BH CV ECHO MEAS - IVS/LVPW: 0.91 CM
BH CV ECHO MEAS - IVSD: 0.78 CM
BH CV ECHO MEAS - LA DIMENSION: 3.8 CM
BH CV ECHO MEAS - LAT PEAK E' VEL: 12.4 CM/SEC
BH CV ECHO MEAS - LV DIASTOLIC VOL/BSA (35-75): 54 CM2
BH CV ECHO MEAS - LV MASS(C)D: 175.5 GRAMS
BH CV ECHO MEAS - LV MAX PG: 4.4 MMHG
BH CV ECHO MEAS - LV MEAN PG: 2 MMHG
BH CV ECHO MEAS - LV SYSTOLIC VOL/BSA (12-30): 28.2 CM2
BH CV ECHO MEAS - LV V1 MAX: 105 CM/SEC
BH CV ECHO MEAS - LV V1 VTI: 27 CM
BH CV ECHO MEAS - LVIDD: 5.4 CM
BH CV ECHO MEAS - LVIDS: 4.2 CM
BH CV ECHO MEAS - LVOT AREA: 3.5 CM2
BH CV ECHO MEAS - LVOT DIAM: 2.1 CM
BH CV ECHO MEAS - LVPWD: 0.85 CM
BH CV ECHO MEAS - MED PEAK E' VEL: 8.2 CM/SEC
BH CV ECHO MEAS - MV A MAX VEL: 99 CM/SEC
BH CV ECHO MEAS - MV DEC TIME: 0.2 SEC
BH CV ECHO MEAS - MV E MAX VEL: 76.5 CM/SEC
BH CV ECHO MEAS - MV E/A: 0.77
BH CV ECHO MEAS - SV(LVOT): 93.5 ML
BH CV ECHO MEAS - SV(MOD-SP2): 52.9 ML
BH CV ECHO MEAS - SV(MOD-SP4): 57.9 ML
BH CV ECHO MEAS - SVI(LVOT): 41.7 ML/M2
BH CV ECHO MEAS - SVI(MOD-SP2): 23.6 ML/M2
BH CV ECHO MEAS - SVI(MOD-SP4): 25.8 ML/M2
BH CV ECHO MEAS - TR MAX VEL: 150 CM/SEC
BH CV ECHO MEASUREMENTS AVERAGE E/E' RATIO: 7.43
BH CV XLRA - RV BASE: 3.9 CM
BH CV XLRA - RV LENGTH: 6.2 CM
BH CV XLRA - RV MID: 2.6 CM
LEFT ATRIUM VOLUME INDEX: 25.7 ML/M2
LEFT ATRIUM VOLUME: 57.6 ML

## 2024-08-21 ENCOUNTER — OFFICE VISIT (OUTPATIENT)
Dept: CARDIOLOGY | Facility: CLINIC | Age: 64
End: 2024-08-21
Payer: MEDICARE

## 2024-08-21 ENCOUNTER — TELEPHONE (OUTPATIENT)
Dept: CARDIOLOGY | Facility: CLINIC | Age: 64
End: 2024-08-21
Payer: OTHER MISCELLANEOUS

## 2024-08-21 VITALS
OXYGEN SATURATION: 98 % | SYSTOLIC BLOOD PRESSURE: 158 MMHG | BODY MASS INDEX: 49.43 KG/M2 | DIASTOLIC BLOOD PRESSURE: 90 MMHG | HEART RATE: 65 BPM | WEIGHT: 279 LBS | HEIGHT: 63 IN

## 2024-08-21 DIAGNOSIS — R07.89 ATYPICAL CHEST PAIN: Primary | ICD-10-CM

## 2024-08-21 DIAGNOSIS — R00.2 PALPITATIONS: ICD-10-CM

## 2024-08-21 DIAGNOSIS — E66.01 MORBID (SEVERE) OBESITY DUE TO EXCESS CALORIES: ICD-10-CM

## 2024-08-21 DIAGNOSIS — R06.02 SHORTNESS OF BREATH: ICD-10-CM

## 2024-08-21 RX ORDER — ROSUVASTATIN CALCIUM 10 MG/1
TABLET, COATED ORAL
COMMUNITY
Start: 2024-05-30 | End: 2024-08-21

## 2024-08-21 RX ORDER — GABAPENTIN 300 MG/1
CAPSULE ORAL
COMMUNITY
Start: 2024-07-16

## 2024-08-21 RX ORDER — LISINOPRIL 10 MG/1
TABLET ORAL
COMMUNITY

## 2024-08-21 RX ORDER — ATENOLOL 50 MG/1
TABLET ORAL
COMMUNITY
Start: 2024-07-24

## 2024-08-21 RX ORDER — LORATADINE 10 MG/1
1 TABLET ORAL DAILY
COMMUNITY

## 2024-08-21 RX ORDER — AZELASTINE HYDROCHLORIDE 137 UG/1
SPRAY, METERED NASAL
COMMUNITY
Start: 2024-08-20

## 2024-08-21 NOTE — PROGRESS NOTES
"    Subjective:     Encounter Date:08/21/2024      Patient ID: Patti Gilbert is a 64 y.o. female.    Chief Complaint:\"no complaints\"  Chest Pain   This is a recurrent problem. The current episode started more than 1 year ago. The onset quality is sudden. The problem occurs intermittently. The problem has been waxing and waning. The pain is present in the lateral region. Associated symptoms include shortness of breath. Pertinent negatives include no cough, dizziness, irregular heartbeat, malaise/fatigue, near-syncope, orthopnea, palpitations, PND, sputum production, syncope or weakness.     Patient presents today for test results. She currently follows with Dr. Summers for PSVT. She has had extensive cardiac workup for atypical chest pain which has included multiple stress tests, all of which have been negative until recently and she had a CT angiogram of the coronaries which noted normal coronaries with a calcium score of 0. She has also had multiple 2D echos due to complaints of dyspnea. She has also had multiple holter monitors placed due to complaints of palpitations. She last saw Dr. Summers in June and noted complaints of chest pain with exertion and at rest, intermittent palpitations and [place]. She was placed in a holter monitor with preliminary results revealing no rhythm symptom correlation. She had a 2D echo with results being normal and a stress echo with results noting echocardiographic evidence for myocardial ischemia in the anterior and apical wall.     She notes her chest pain remains about the same. It occurs occasionally at rest or with exertion in different locations in her left chest. She notes shortness of breath can occur with and without exertion as well. If she develops dyspnea at rest, she typically uses her albuterol inhaler which eliminates her dyspnea. She notes her palpitations only occur when she is upset or stressed. She monitors her BP at home and reports readings in the 150/80s.    The " following portions of the patient's history were reviewed and updated as appropriate: allergies, current medications, past family history, past medical history, past social history, past surgical history and problem list.    Allergies   Allergen Reactions    Cefdinir Shortness Of Breath and Rash    Latex Itching and Other (See Comments)    Celecoxib Other (See Comments)    Empagliflozin Unknown - High Severity    Levofloxacin Unknown (See Comments)     Patient stated feeling like she was dying.    Rosuvastatin Hallucinations    Acetaminophen Rash     Other reaction(s): CAUSES ITCHING    Hydrocodone-Acetaminophen Itching and Rash     7.5 mg tabs       Current Outpatient Medications:     albuterol sulfate  (90 Base) MCG/ACT inhaler, Inhale 2 puffs Every 6 (Six) Hours As Needed for Wheezing or Shortness of Air., Disp: , Rfl:     amLODIPine (NORVASC) 10 MG tablet, , Disp: , Rfl:     aspirin 81 MG EC tablet, Take 1 tablet by mouth Daily., Disp: , Rfl:     atenolol (TENORMIN) 50 MG tablet, , Disp: , Rfl:     Azelastine HCl 137 MCG/SPRAY solution, , Disp: , Rfl:     cetirizine (zyrTEC) 10 MG tablet, Take 1 tablet by mouth Daily As Needed for Allergies., Disp: , Rfl:     cyclobenzaprine (FLEXERIL) 10 MG tablet, , Disp: , Rfl:     gabapentin (NEURONTIN) 300 MG capsule, Take 1 capsule twice a day by oral route for 30 days., Disp: , Rfl:     HYDROcodone-acetaminophen (NORCO) 7.5-325 MG per tablet, Take 1 tablet by mouth Every 6 (Six) Hours As Needed for Moderate Pain., Disp: , Rfl:     lisinopril (PRINIVIL,ZESTRIL) 10 MG tablet, Take 0.5 tablets every day by oral route., Disp: , Rfl:     loratadine (CLARITIN) 10 MG tablet, Take 1 tablet by mouth Daily., Disp: , Rfl:     montelukast (SINGULAIR) 10 MG tablet, Take 1 tablet by mouth Daily As Needed., Disp: , Rfl:     nitroglycerin (NITROSTAT) 0.4 MG SL tablet, Place 1 tablet under the tongue., Disp: , Rfl:     Dapagliflozin Propanediol (FARXIGA PO), Take  by mouth.  (Patient not taking: Reported on 8/21/2024), Disp: , Rfl:   Past Medical History:   Diagnosis Date    Asthma     Atrial septal defect     2232010    Breast cancer     Chronic kidney disease Few months ago    Have a cancerous tumor on my right kidney    COPD (chronic obstructive pulmonary disease)     Diabetes mellitus     H/O cardiac catheterization     Heart valve disease Years ago    Dr. Dickson    History of left fractured kneecap     Hypertension     Mini Stroke     Morbid obesity     Myocardial infarction     Recretional Drug use     Right knee injury     Sleep apnea Months ago    Dr. Summers referred me to continue treatment       Social History     Socioeconomic History    Marital status:    Tobacco Use    Smoking status: Never     Passive exposure: Never    Smokeless tobacco: Never   Vaping Use    Vaping status: Never Used   Substance and Sexual Activity    Alcohol use: Yes     Alcohol/week: 2.0 standard drinks of alcohol     Types: 2 Cans of beer per week     Comment: Occasionally    Drug use: Never    Sexual activity: Yes     Partners: Male     Birth control/protection: Post-menopausal, Tubal ligation       Review of Systems   Constitutional: Negative for malaise/fatigue, weight gain and weight loss.   Cardiovascular:  Positive for chest pain. Negative for dyspnea on exertion, irregular heartbeat, leg swelling, near-syncope, orthopnea, palpitations, paroxysmal nocturnal dyspnea and syncope.   Respiratory:  Positive for shortness of breath. Negative for cough, sleep disturbances due to breathing, sputum production and wheezing.    Skin:  Negative for dry skin, flushing, itching and rash.   Gastrointestinal:  Negative for hematemesis and hematochezia.   Neurological:  Negative for dizziness, light-headedness, loss of balance and weakness.   All other systems reviewed and are negative.         Objective:     Vitals reviewed.   Constitutional:       General: Not in acute distress.     Appearance: Healthy  "appearance. Well-developed. Morbidly obese. Not diaphoretic.   Eyes:      General: No scleral icterus.     Conjunctiva/sclera: Conjunctivae normal.      Pupils: Pupils are equal, round, and reactive to light.   HENT:      Head: Normocephalic.    Mouth/Throat:      Pharynx: No oropharyngeal exudate.   Neck:      Vascular: No JVR.   Pulmonary:      Effort: Pulmonary effort is normal. No respiratory distress.      Breath sounds: Normal breath sounds. No wheezing. No rhonchi. No rales.   Chest:      Chest wall: Not tender to palpatation.   Cardiovascular:      Normal rate. Regular rhythm.   Pulses:     Intact distal pulses.   Edema:     Peripheral edema absent.   Abdominal:      General: Bowel sounds are normal. There is no distension.      Palpations: Abdomen is soft.      Tenderness: There is no abdominal tenderness.   Musculoskeletal: Normal range of motion.      Cervical back: Normal range of motion and neck supple. Skin:     General: Skin is warm and dry.      Coloration: Skin is not pale.      Findings: No erythema or rash.   Neurological:      Mental Status: Alert, oriented to person, place, and time and oriented to person, place and time.      Deep Tendon Reflexes: Reflexes are normal and symmetric.   Psychiatric:         Behavior: Behavior normal.           Procedures  /90 (BP Location: Right arm, Patient Position: Sitting, Cuff Size: Large Adult)   Pulse 65   Ht 160 cm (63\")   Wt 127 kg (279 lb)   LMP  (LMP Unknown)   SpO2 98%   BMI 49.42 kg/m²     Lab Review:   I have reviewed previous office notes, recent labs and recent cardiac testing.   Stress echo 7/25/24:  Interpretation Summary         Abnormal stress echo with echocardiographic evidence for myocardial ischemia located in the anterior wall and apical wall.    Left ventricular ejection fraction appears to be 56 - 60%.  Results for orders placed during the hospital encounter of 08/09/24    Adult Transthoracic Echo Complete w/ Color, Spectral " and Contrast if necessary per protocol    Interpretation Summary    Left ventricular ejection fraction appears to be 56 - 60%.    Left ventricular diastolic function was normal.    Estimated right ventricular systolic pressure from tricuspid regurgitation is normal (<35 mmHg).    Normal global longitudinal LV strain (GLS) = -17.9%          Assessment:          Diagnosis Plan   1. Atypical chest pain        2. Palpitations               Plan:       1 Atypical chest pain- c/o chest pain that are atypical for ischemia as they occur at random and in different locations within her chest. She had a CTA of the coronaries in April 2023 with a calcium score of 0 however her stress echo was abnormal. Will discuss further plans with Dr. Summers.  2. Palpitations- no rhythm-symptom correlation per preliminary holter results.   3. Dyspnea- normal echo but has an abnormal stress test. Again will discuss further with Dr. Summers.   4. BMI- Class 3 Severe Obesity (BMI >=40). Obesity-related health conditions include the following: hypertension, diabetes mellitus, and dyslipidemias. Obesity is unchanged. BMI is is above average; BMI management plan is completed. We discussed portion control and increasing exercise.        Follow up in 6 months or sooner if symptoms worsen.     I spent 30 minutes caring for Patti on this date of service. This time includes time spent by me in the following activities:preparing for the visit, reviewing tests, obtaining and/or reviewing a separately obtained history, performing a medically appropriate examination and/or evaluation , counseling and educating the patient/family/caregiver, ordering medications, tests, or procedures, referring and communicating with other health care professionals , and documenting information in the medical record    Addendum: I have spoke with Dr. Summers who recommends repeating CT angiogram of the coronaries. Will inform patient and place orders if she is agreeable.

## 2024-08-21 NOTE — TELEPHONE ENCOUNTER
Patient notified, voiced understanding and agreement to proceed.  Please place order.  Janet Pretty MA

## 2024-08-21 NOTE — TELEPHONE ENCOUNTER
----- Message from Fabiana Shultz sent at 8/21/2024  3:07 PM CDT -----  Will you let her know I spoke with Dr. Summers who recommends we repeat the CT angiogram of the coronaries if she is agreeable.

## 2024-08-22 DIAGNOSIS — R07.89 ATYPICAL CHEST PAIN: Primary | ICD-10-CM

## 2024-08-22 RX ORDER — SODIUM CHLORIDE 9 MG/ML
40 INJECTION, SOLUTION INTRAVENOUS AS NEEDED
OUTPATIENT
Start: 2024-08-22

## 2024-08-22 RX ORDER — METOPROLOL TARTRATE 1 MG/ML
5 INJECTION, SOLUTION INTRAVENOUS
OUTPATIENT
Start: 2024-08-22

## 2024-08-22 RX ORDER — SODIUM CHLORIDE 0.9 % (FLUSH) 0.9 %
10 SYRINGE (ML) INJECTION AS NEEDED
OUTPATIENT
Start: 2024-08-22

## 2024-08-22 RX ORDER — NITROGLYCERIN 0.4 MG/1
0.4 TABLET SUBLINGUAL
OUTPATIENT
Start: 2024-08-22 | End: 2024-08-22

## 2024-08-22 RX ORDER — METOPROLOL TARTRATE 50 MG/1
50 TABLET, FILM COATED ORAL
OUTPATIENT
Start: 2024-08-22

## 2024-08-22 RX ORDER — SODIUM CHLORIDE 0.9 % (FLUSH) 0.9 %
10 SYRINGE (ML) INJECTION EVERY 12 HOURS SCHEDULED
OUTPATIENT
Start: 2024-08-22

## 2024-08-22 RX ORDER — NITROGLYCERIN 0.4 MG/1
0.8 TABLET SUBLINGUAL
OUTPATIENT
Start: 2024-08-22

## 2024-08-26 ENCOUNTER — HOSPITAL ENCOUNTER (OUTPATIENT)
Dept: GENERAL RADIOLOGY | Facility: HOSPITAL | Age: 64
Discharge: HOME | End: 2024-08-26
Admitting: NURSE PRACTITIONER
Payer: OTHER MISCELLANEOUS

## 2024-08-26 ENCOUNTER — OFFICE VISIT (OUTPATIENT)
Dept: NEUROSURGERY | Facility: CLINIC | Age: 64
End: 2024-08-26
Payer: OTHER MISCELLANEOUS

## 2024-08-26 VITALS — BODY MASS INDEX: 50.5 KG/M2 | HEIGHT: 63 IN | WEIGHT: 285 LBS

## 2024-08-26 DIAGNOSIS — G89.29 CHRONIC NECK PAIN: Primary | ICD-10-CM

## 2024-08-26 DIAGNOSIS — Z78.9 NONSMOKER: ICD-10-CM

## 2024-08-26 DIAGNOSIS — E66.01 CLASS 3 SEVERE OBESITY DUE TO EXCESS CALORIES WITHOUT SERIOUS COMORBIDITY WITH BODY MASS INDEX (BMI) OF 50.0 TO 59.9 IN ADULT: ICD-10-CM

## 2024-08-26 DIAGNOSIS — M54.2 CHRONIC NECK PAIN: Primary | ICD-10-CM

## 2024-08-26 DIAGNOSIS — M54.12 CERVICAL RADICULOPATHY: ICD-10-CM

## 2024-08-26 PROCEDURE — 72052 X-RAY EXAM NECK SPINE 6/>VWS: CPT

## 2024-08-26 PROCEDURE — 99214 OFFICE O/P EST MOD 30 MIN: CPT | Performed by: NURSE PRACTITIONER
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
## 2024-08-26 NOTE — PATIENT INSTRUCTIONS
Advance Care Planning and Advance Directives     You make decisions on a daily basis - decisions about where you want to live, your career, your home, your life. Perhaps one of the most important decisions you face is your choice for future medical care. Take time to talk with your family and your healthcare team and start planning today.  Advance Care Planning is a process that can help you:  Understand possible future healthcare decisions in light of your own experiences  Reflect on those decision in light of your goals and values  Discuss your decisions with those closest to you and the healthcare professionals that care for you  Make a plan by creating a document that reflects your wishes    Surrogate Decision Maker  In the event of a medical emergency, which has left you unable to communicate or to make your own decisions, you would need someone to make decisions for you.  It is important to discuss your preferences for medical treatment with this person while you are in good health.     Qualities of a surrogate decision maker:  Willing to take on this role and responsibility  Knows what you want for future medical care  Willing to follow your wishes even if they don't agree with them  Able to make difficult medical decisions under stressful circumstances    Advance Directives  These are legal documents you can create that will guide your healthcare team and decision maker(s) when needed. These documents can be stored in the electronic medical record.    Living Will - a legal document to guide your care if you have a terminal condition or a serious illness and are unable to communicate. States vary by statute in document names/types, but most forms may include one or more of the following:        -  Directions regarding life-prolonging treatments        -  Directions regarding artificially provided nutrition/hydration        -  Choosing a healthcare decision maker        -  Direction regarding organ/tissue  donation    Durable Power of  for Healthcare - this document names an -in-fact to make medical decisions for you, but it may also allow this person to make personal and financial decisions for you. Please seek the advice of an  if you need this type of document.    **Advance Directives are not required and no one may discriminate against you if you do not sign one.    Medical Orders  Many states allow specific forms/orders signed by your physician to record your wishes for medical treatment in your current state of health. This form, signed in personal communication with your physician, addresses resuscitation and other medical interventions that you may or may not want.      For more information or to schedule a time with a [facility] Advance Care Planning Facilitator contact: [facility] or call [phone] and someone will contact you directly.BMI for Adults  What is BMI?  Body mass index (BMI) is a number that is calculated from a person's weight and height. BMI can help estimate how much of a person's weight is composed of fat. BMI does not measure body fat directly. Rather, it is an alternative to procedures that directly measure body fat, which can be difficult and expensive.  BMI can help identify people who may be at higher risk for certain medical problems.  What are BMI measurements used for?  BMI is used as a screening tool to identify possible weight problems. It helps determine whether a person is obese, overweight, a healthy weight, or underweight.  BMI is useful for:  Identifying a weight problem that may be related to a medical condition or may increase the risk for medical problems.  Promoting changes, such as changes in diet and exercise, to help reach a healthy weight. BMI screening can be repeated to see if these changes are working.  How is BMI calculated?  BMI involves measuring your weight in relation to your height. Both height and weight are measured,  "and the BMI is calculated from those numbers. This can be done either in English ([place] or metric measurements. Note that charts and online BMI calculators are available to help you find your BMI quickly and easily without having to do these calculations yourself.  To calculate your BMI in English ([place] measurements:    Measure your weight in pounds (lb).  Multiply the number of pounds by 703.  For example, for a person who weighs 180 lb, multiply that number by 703, which equals 126,540.  Measure your height in inches. Then multiply that number by itself to get a measurement called \"inches squared.\"  For example, for a person who is 70 inches tall, the \"inches squared\" measurement is 70 inches x 70 inches, which equals 4,900 inches squared.  Divide the total from step 2 (number of lb x 703) by the total from step 3 (inches squared): 126,540 ÷ 4,900 = 25.8. This is your BMI.    To calculate your BMI in metric measurements:  Measure your weight in kilograms (kg).  Measure your height in meters (m). Then multiply that number by itself to get a measurement called \"meters squared.\"  For example, for a person who is 1.75 m tall, the \"meters squared\" measurement is 1.75 m x 1.75 m, which is equal to 3.1 meters squared.  Divide the number of kilograms (your weight) by the meters squared number. In this example: 70 ÷ 3.1 = 22.6. This is your BMI.  What do the results mean?  BMI charts are used to identify whether you are underweight, normal weight, overweight, or obese. The following guidelines will be used:  Underweight: BMI less than 18.5.  Normal weight: BMI between 18.5 and 24.9.  Overweight: BMI between 25 and 29.9.  Obese: BMI of 30 or above.  Keep these notes in mind:  Weight includes both fat and muscle, so someone with a muscular build, such as an athlete, may have a BMI that is higher than 24.9. In cases like these, BMI is not an accurate measure of body fat.  To determine if excess body fat is the cause of a BMI " "of 25 or higher, further assessments may need to be done by a health care provider.  BMI is usually interpreted in the same way for men and women.  Where to find more information  For more information about BMI, including tools to quickly calculate your BMI, go to these websites:  Centers for Disease Control and Prevention: www.cdc.gov  American Heart Association: www.heart.org  National Heart, Lung, and Blood [place]: www.nhlbi.nih.gov  Summary  Body mass index (BMI) is a number that is calculated from a person's weight and height.  BMI may help estimate how much of a person's weight is composed of fat. BMI can help identify those who may be at higher risk for certain medical problems.  BMI can be measured using English measurements or metric measurements.  BMI charts are used to identify whether you are underweight, normal weight, overweight, or obese.  This information is not intended to replace advice given to you by your health care provider. Make sure you discuss any questions you have with your health care provider.  Document Revised: 09/09/2020 Document Reviewed: 07/17/2020  [employer] Patient Education © 2021 [employer] https://www.nhlbi.nih.gov/files/docs/public/heart/dash_brief.pdf\">   DASH Eating Plan  DASH stands for Dietary Approaches to Stop Hypertension. The DASH eating plan is a healthy eating plan that has been shown to:  Reduce high blood pressure (hypertension).  Reduce your risk for type 2 diabetes, heart disease, and stroke.  Help with weight loss.  What are tips for following this plan?  Reading food labels  Check food labels for the amount of salt (sodium) per serving. Choose foods with less than 5 percent of the Daily Value of sodium. Generally, foods with less than 300 milligrams (mg) of sodium per serving fit into this eating plan.  To find whole grains, look for the word \"whole\" as the first word in the ingredient list.  Shopping  Buy products labeled as \"low-sodium\" or \"no salt " "added.\"  Buy fresh foods. Avoid canned foods and pre-made or frozen meals.  Cooking  Avoid adding salt when cooking. Use salt-free seasonings or herbs instead of table salt or sea salt. Check with your health care provider or pharmacist before using salt substitutes.  Do not young foods. Cook foods using healthy methods such as baking, boiling, grilling, roasting, and broiling instead.  Cook with heart-healthy oils, such as olive, canola, avocado, soybean, or sunflower oil.  Meal planning    Eat a balanced diet that includes:  4 or more servings of fruits and 4 or more servings of vegetables each day. Try to fill one-half of your plate with fruits and vegetables.  6-8 servings of whole grains each day.  Less than 6 oz (170 g) of lean meat, poultry, or fish each day. A 3-oz (85-g) serving of meat is about the same size as a deck of cards. One egg equals 1 oz (28 g).  2-3 servings of low-fat dairy each day. One serving is 1 cup (237 mL).  1 serving of nuts, seeds, or beans 5 times each week.  2-3 servings of heart-healthy fats. Healthy fats called omega-3 fatty acids are found in foods such as walnuts, flaxseeds, fortified milks, and eggs. These fats are also found in cold-water fish, such as sardines, salmon, and mackerel.  Limit how much you eat of:  Canned or prepackaged foods.  Food that is high in trans fat, such as some fried foods.  Food that is high in saturated fat, such as fatty meat.  Desserts and other sweets, sugary drinks, and other foods with added sugar.  Full-fat dairy products.  Do not salt foods before eating.  Do not eat more than 4 egg yolks a week.  Try to eat at least 2 vegetarian meals a week.  Eat more home-cooked food and less restaurant, buffet, and fast food.    Lifestyle  When eating at a restaurant, ask that your food be prepared with less salt or no salt, if possible.  If you drink alcohol:  Limit how much you use to:  0-1 drink a day for women who are not pregnant.  0-2 drinks a day for " men.  Be aware of how much alcohol is in your drink. In the [place] one drink equals one 12 oz bottle of beer (355 mL), one 5 oz glass of wine (148 mL), or one 1½ oz glass of hard liquor (44 mL).  General information  Avoid eating more than 2,300 mg of salt a day. If you have hypertension, you may need to reduce your sodium intake to 1,500 mg a day.  Work with your health care provider to maintain a healthy body weight or to lose weight. Ask what an ideal weight is for you.  Get at least 30 minutes of exercise that causes your heart to beat faster (aerobic exercise) most days of the week. Activities may include walking, swimming, or biking.  Work with your health care provider or dietitian to adjust your eating plan to your individual calorie needs.  What foods should I eat?  Fruits  All fresh, dried, or frozen fruit. Canned fruit in natural juice (without added sugar).  Vegetables  Fresh or frozen vegetables (raw, steamed, roasted, or grilled). Low-sodium or reduced-sodium tomato and vegetable juice. Low-sodium or reduced-sodium tomato sauce and tomato paste. Low-sodium or reduced-sodium canned vegetables.  Grains  Whole-grain or whole-wheat bread. Whole-grain or whole-wheat pasta. Brown rice. Oatmeal. Quinoa. Bulgur. Whole-grain and low-sodium cereals. Lorri bread. Low-fat, low-sodium crackers. Whole-wheat flour tortillas.  Meats and other proteins  Skinless chicken or turkey. Ground chicken or turkey. Pork with fat trimmed off. Fish and seafood. Egg whites. Dried beans, peas, or lentils. Unsalted nuts, nut butters, and seeds. Unsalted canned beans. Lean cuts of beef with fat trimmed off. Low-sodium, lean precooked or cured meat, such as sausages or meat loaves.  Dairy  Low-fat (1%) or fat-free (skim) milk. Reduced-fat, low-fat, or fat-free cheeses. Nonfat, low-sodium ricotta or cottage cheese. Low-fat or nonfat yogurt. Low-fat, low-sodium cheese.  Fats and oils  Soft margarine without trans fats. Vegetable oil.  Reduced-fat, low-fat, or light mayonnaise and salad dressings (reduced-sodium). Canola, safflower, olive, avocado, soybean, and sunflower oils. Avocado.  Seasonings and condiments  Herbs. Spices. Seasoning mixes without salt.  Other foods  Unsalted popcorn and pretzels. Fat-free sweets.  The items listed above may not be a complete list of foods and beverages you can eat. Contact a dietitian for more information.  What foods should I avoid?  Fruits  Canned fruit in a light or heavy syrup. Fried fruit. Fruit in cream or butter sauce.  Vegetables  Creamed or fried vegetables. Vegetables in a cheese sauce. Regular canned vegetables (not low-sodium or reduced-sodium). Regular canned tomato sauce and paste (not low-sodium or reduced-sodium). Regular tomato and vegetable juice (not low-sodium or reduced-sodium). Pickles. Olives.  Grains  Baked goods made with fat, such as croissants, muffins, or some breads. Dry pasta or rice meal packs.  Meats and other proteins  Fatty cuts of meat. Ribs. Fried meat. Marie. Bologna, salami, and other precooked or cured meats, such as sausages or meat loaves. Fat from the back of a pig (fatback). Bratwurst. Salted nuts and seeds. Canned beans with added salt. Canned or smoked fish. Whole eggs or egg yolks. Chicken or turkey with skin.  Dairy  Whole or 2% milk, cream, and half-and-half. Whole or full-fat cream cheese. Whole-fat or sweetened yogurt. Full-fat cheese. Nondairy creamers. Whipped toppings. Processed cheese and cheese spreads.  Fats and oils  Butter. Stick margarine. Lard. Shortening. Ghee. Marie fat. Tropical oils, such as coconut, palm kernel, or palm oil.  Seasonings and condiments  Onion salt, garlic salt, seasoned salt, table salt, and sea salt. Worcestershire sauce. Tartar sauce. Barbecue sauce. Teriyaki sauce. Soy sauce, including reduced-sodium. Steak sauce. Canned and packaged gravies. Fish sauce. Oyster sauce. Cocktail sauce. Store-bought horseradish. Ketchup. Mustard.  Meat flavorings and tenderizers. Bouillon cubes. Hot sauces. Pre-made or packaged marinades. Pre-made or packaged taco seasonings. Relishes. Regular salad dressings.  Other foods  Salted popcorn and pretzels.  The items listed above may not be a complete list of foods and beverages you should avoid. Contact a dietitian for more information.  Where to find more information  National Heart, Lung, and Blood [place]: www.nhlbi.nih.gov  American Heart Association: www.heart.org  Academy of Nutrition and Dietetics: www.eatright.org  National Kidney Foundation: www.kidney.org  Summary  The DASH eating plan is a healthy eating plan that has been shown to reduce high blood pressure (hypertension). It may also reduce your risk for type 2 diabetes, heart disease, and stroke.  When on the DASH eating plan, aim to eat more fresh fruits and vegetables, whole grains, lean proteins, low-fat dairy, and heart-healthy fats.  With the DASH eating plan, you should limit salt (sodium) intake to 2,300 mg a day. If you have hypertension, you may need to reduce your sodium intake to 1,500 mg a day.  Work with your health care provider or dietitian to adjust your eating plan to your individual calorie needs.  This information is not intended to replace advice given to you by your health care provider. Make sure you discuss any questions you have with your health care provider.  Document Revised: 11/20/2020 Document Reviewed: 11/20/2020  [employer] Patient Education © 2021 [employer] High-Protein and High-Calorie Diet  Eating high-protein and high-calorie foods can help you to gain weight, heal after an injury, and recover after an illness or surgery. The specific amount of daily protein and calories you need depends on:  Your body weight.  The reason this diet is recommended for you.  What is my plan?  Generally, a high-protein, high-calorie diet involves:  Eating 250-500 extra calories each day.  Making sure that you get enough of your  daily calories from protein. Ask your health care provider how many of your calories should come from protein.  Talk with a health care provider, such as a diet and nutrition specialist (dietitian), about how much protein and how many calories you need each day. Follow the diet as directed by your health care provider.  What are tips for following this plan?    Preparing meals  Add whole milk, half-and-half, or heavy cream to cereal, pudding, soup, or hot cocoa.  Add whole milk to instant breakfast drinks.  Add peanut butter to oatmeal or smoothies.  Add powdered milk to baked goods, smoothies, or milkshakes.  Add powdered milk, cream, or butter to mashed potatoes.  Add cheese to cooked vegetables.  Make whole-milk yogurt parfaits. Top them with granola, fruit, or nuts.  Add cottage cheese to your fruit.  Add avocado, cheese, or both to sandwiches or salads.  Add meat, poultry, or seafood to rice, pasta, casseroles, salads, and soups.  Use mayonnaise when making egg salad, chicken salad, or tuna salad.  Use peanut butter as a dip for vegetables or as a topping for pretzels, celery, or crackers.  Add beans to casseroles, dips, and spreads.  Add pureed beans to sauces and soups.  Replace calorie-free drinks with calorie-containing drinks, such as milk and fruit juice.  Replace water with milk or heavy cream when making foods such as oatmeal, pudding, or cocoa.  General instructions  Ask your health care provider if you should take a nutritional supplement.  Try to eat six small meals each day instead of three large meals.  Eat a balanced diet. In each meal, include one food that is high in protein.  Keep nutritious snacks available, such as nuts, trail mixes, dried fruit, and yogurt.  If you have kidney disease or diabetes, talk with your health care provider about how much protein is safe for you. Too much protein may put extra stress on your kidneys.  Drink your calories. Choose high-calorie drinks and have them  after your meals.  What high-protein foods should I eat?    Vegetables  Soybeans. Peas.  Grains  Quinoa. Bulgur wheat.  Meats and other proteins  Beef, pork, and poultry. Fish and seafood. Eggs. Tofu. Textured vegetable protein (TVP). Peanut butter. Nuts and seeds. Dried beans. Protein powders.  Dairy  Whole milk. Whole-milk yogurt. Powdered milk. Cheese. Cottage Cheese. Eggnog.  Beverages  High-protein supplement drinks. Soy milk.  Other foods  Protein bars.  The items listed above may not be a complete list of high-protein foods and beverages. Contact a dietitian for more options.  What high-calorie foods should I eat?  Fruits  Dried fruit. Fruit leather. Canned fruit in syrup. Fruit juice. Avocado.  Vegetables  Vegetables cooked in oil or butter. Fried potatoes.  Grains  Pasta. Quick breads. Muffins. Pancakes. Ready-to-eat cereal.  Meats and other proteins  Peanut butter. Nuts and seeds.  Dairy  Heavy cream. Whipped cream. Cream cheese. Sour cream. Ice cream. Custard. Pudding.  Beverages  Meal-replacement beverages. Nutrition shakes. Fruit juice. Sugar-sweetened soft drinks.  Seasonings and condiments  Salad dressing. Mayonnaise. Candelario sauce. Fruit preserves or jelly. Honey. Syrup.  Sweets and desserts  Cake. Cookies. Pie. Pastries. Candy bars. Chocolate.  Fats and oils  Butter or margarine. Oil. Gravy.  Other foods  Meal-replacement bars.  The items listed above may not be a complete list of high-calorie foods and beverages. Contact a dietitian for more options.  Summary  A high-protein, high-calorie diet can help you gain weight or heal faster after an injury, illness, or surgery.  To increase your protein and calories, add ingredients such as whole milk, peanut butter, cheese, beans, meat, or seafood to meal items.  To get enough extra calories each day, include high-calorie foods and beverages at each meal.  Adding a high-calorie drink or shake can be an easy way to help you get enough calories each day.  Talk with your healthcare provider or dietitian about the best options for you.  This information is not intended to replace advice given to you by your health care provider. Make sure you discuss any questions you have with your health care provider.  Document Revised: 11/30/2018 Document Reviewed: 10/30/2018  [employer] Patient Education © 2021 [employer].

## 2024-08-28 ENCOUNTER — HOSPITAL ENCOUNTER (OUTPATIENT)
Dept: CT IMAGING | Facility: HOSPITAL | Age: 64
Discharge: HOME | End: 2024-08-28
Payer: MEDICARE

## 2024-08-28 VITALS
HEIGHT: 64 IN | TEMPERATURE: 97.2 F | BODY MASS INDEX: 49.19 KG/M2 | DIASTOLIC BLOOD PRESSURE: 89 MMHG | SYSTOLIC BLOOD PRESSURE: 157 MMHG | HEART RATE: 55 BPM | OXYGEN SATURATION: 98 % | WEIGHT: 288.14 LBS | RESPIRATION RATE: 18 BRPM

## 2024-08-28 DIAGNOSIS — R07.89 ATYPICAL CHEST PAIN: ICD-10-CM

## 2024-08-28 LAB
CREAT SERPL-MCNC: 1.36 MG/DL (ref 0.57–1)
EGFRCR SERPLBLD CKD-EPI 2021: 43.3 ML/MIN/1.73

## 2024-08-28 PROCEDURE — 82565 ASSAY OF CREATININE: CPT | Performed by: NURSE PRACTITIONER

## 2024-08-28 PROCEDURE — 75574 CT ANGIO HRT W/3D IMAGE: CPT

## 2024-08-28 PROCEDURE — 25510000001 IOPAMIDOL PER 1 ML: Performed by: NURSE PRACTITIONER

## 2024-08-28 RX ORDER — METOPROLOL TARTRATE 100 MG
200 TABLET ORAL ONCE
Status: COMPLETED | OUTPATIENT
Start: 2024-08-28 | End: 2024-08-28

## 2024-08-28 RX ORDER — SODIUM CHLORIDE 9 MG/ML
40 INJECTION, SOLUTION INTRAVENOUS AS NEEDED
Status: DISCONTINUED | OUTPATIENT
Start: 2024-08-28 | End: 2024-08-29 | Stop reason: HOSPADM

## 2024-08-28 RX ORDER — SODIUM CHLORIDE 0.9 % (FLUSH) 0.9 %
10 SYRINGE (ML) INJECTION AS NEEDED
Status: DISCONTINUED | OUTPATIENT
Start: 2024-08-28 | End: 2024-08-29 | Stop reason: HOSPADM

## 2024-08-28 RX ORDER — NITROGLYCERIN 0.4 MG/1
0.8 TABLET SUBLINGUAL
Status: COMPLETED | OUTPATIENT
Start: 2024-08-28 | End: 2024-08-28

## 2024-08-28 RX ORDER — SODIUM CHLORIDE 0.9 % (FLUSH) 0.9 %
10 SYRINGE (ML) INJECTION EVERY 12 HOURS SCHEDULED
Status: DISCONTINUED | OUTPATIENT
Start: 2024-08-28 | End: 2024-08-29 | Stop reason: HOSPADM

## 2024-08-28 RX ORDER — NITROGLYCERIN 0.4 MG/1
0.4 TABLET SUBLINGUAL
Status: COMPLETED | OUTPATIENT
Start: 2024-08-28 | End: 2024-08-28

## 2024-08-28 RX ORDER — METOPROLOL TARTRATE 50 MG
50 TABLET ORAL ONCE
Status: COMPLETED | OUTPATIENT
Start: 2024-08-28 | End: 2024-08-28

## 2024-08-28 RX ORDER — IOPAMIDOL 755 MG/ML
200 INJECTION, SOLUTION INTRAVASCULAR
Status: COMPLETED | OUTPATIENT
Start: 2024-08-28 | End: 2024-08-28

## 2024-08-28 RX ORDER — METOPROLOL TARTRATE 1 MG/ML
5 INJECTION, SOLUTION INTRAVENOUS
Status: DISCONTINUED | OUTPATIENT
Start: 2024-08-28 | End: 2024-08-29 | Stop reason: HOSPADM

## 2024-08-28 RX ORDER — METOPROLOL TARTRATE 25 MG/1
25 TABLET, FILM COATED ORAL ONCE
Status: COMPLETED | OUTPATIENT
Start: 2024-08-28 | End: 2024-08-28

## 2024-08-28 RX ORDER — METOPROLOL TARTRATE 100 MG
100 TABLET ORAL ONCE
Status: COMPLETED | OUTPATIENT
Start: 2024-08-28 | End: 2024-08-28

## 2024-08-28 RX ORDER — METOPROLOL TARTRATE 50 MG
50 TABLET ORAL
Status: DISCONTINUED | OUTPATIENT
Start: 2024-08-28 | End: 2024-08-29 | Stop reason: HOSPADM

## 2024-08-28 RX ADMIN — METOPROLOL TARTRATE 25 MG: 25 TABLET, FILM COATED ORAL at 12:43

## 2024-08-28 RX ADMIN — IOPAMIDOL 200 ML: 755 INJECTION, SOLUTION INTRAVENOUS at 14:25

## 2024-08-28 RX ADMIN — NITROGLYCERIN 0.8 MG: 0.4 TABLET SUBLINGUAL at 13:57

## 2024-09-03 ENCOUNTER — OFFICE VISIT (OUTPATIENT)
Dept: NEUROLOGY | Age: 64
End: 2024-09-03
Payer: MEDICAID

## 2024-09-03 ENCOUNTER — TELEPHONE (OUTPATIENT)
Dept: CARDIOLOGY | Facility: CLINIC | Age: 64
End: 2024-09-03

## 2024-09-03 ENCOUNTER — TELEPHONE (OUTPATIENT)
Dept: CARDIOLOGY | Facility: CLINIC | Age: 64
End: 2024-09-03
Payer: OTHER MISCELLANEOUS

## 2024-09-03 VITALS
SYSTOLIC BLOOD PRESSURE: 140 MMHG | BODY MASS INDEX: 50.85 KG/M2 | HEIGHT: 63 IN | RESPIRATION RATE: 16 BRPM | DIASTOLIC BLOOD PRESSURE: 76 MMHG | HEART RATE: 67 BPM | WEIGHT: 287 LBS

## 2024-09-03 DIAGNOSIS — R56.9 CONVULSIONS, UNSPECIFIED CONVULSION TYPE (HCC): ICD-10-CM

## 2024-09-03 DIAGNOSIS — M54.2 NECK PAIN: ICD-10-CM

## 2024-09-03 DIAGNOSIS — G56.03 BILATERAL CARPAL TUNNEL SYNDROME: ICD-10-CM

## 2024-09-03 DIAGNOSIS — G43.009 MIGRAINE WITHOUT AURA AND WITHOUT STATUS MIGRAINOSUS, NOT INTRACTABLE: Primary | ICD-10-CM

## 2024-09-03 PROCEDURE — 99213 OFFICE O/P EST LOW 20 MIN: CPT | Performed by: NURSE PRACTITIONER

## 2024-09-03 PROCEDURE — 1036F TOBACCO NON-USER: CPT | Performed by: NURSE PRACTITIONER

## 2024-09-03 PROCEDURE — G8417 CALC BMI ABV UP PARAM F/U: HCPCS | Performed by: NURSE PRACTITIONER

## 2024-09-03 PROCEDURE — 1123F ACP DISCUSS/DSCN MKR DOCD: CPT | Performed by: NURSE PRACTITIONER

## 2024-09-03 PROCEDURE — 3078F DIAST BP <80 MM HG: CPT | Performed by: NURSE PRACTITIONER

## 2024-09-03 PROCEDURE — G8400 PT W/DXA NO RESULTS DOC: HCPCS | Performed by: NURSE PRACTITIONER

## 2024-09-03 PROCEDURE — 1090F PRES/ABSN URINE INCON ASSESS: CPT | Performed by: NURSE PRACTITIONER

## 2024-09-03 PROCEDURE — 3017F COLORECTAL CA SCREEN DOC REV: CPT | Performed by: NURSE PRACTITIONER

## 2024-09-03 PROCEDURE — G8427 DOCREV CUR MEDS BY ELIG CLIN: HCPCS | Performed by: NURSE PRACTITIONER

## 2024-09-03 PROCEDURE — 3077F SYST BP >= 140 MM HG: CPT | Performed by: NURSE PRACTITIONER

## 2024-09-03 NOTE — TELEPHONE ENCOUNTER
----- Message from Fabiana Shultz sent at 9/3/2024  7:52 AM CDT -----  Please let patient know her CT angiogram showed a calcium score of 0. Her abnormal stress test is false positive. No further workup recommended at this time.

## 2024-09-03 NOTE — PROGRESS NOTES
[facility] Office Note      Patient:   Sonja Shabazz  MR#:    [id number]  Account Number:                         YOB: 1960  Date of Evaluation:  9/3/2024  Time of Note:                          3:16 PM  Primary/Referring Physician:  Negra Epps, APRN - CNP   Consulting Physician:  Catrina Hu DNP, APRN    FOLLOW UP    Chief Complaint   Patient presents with    Headache     Patient states she is having headache on and off     HISTORY OF PRESENT ILLNESS    Sonja Shabazz is a 64 y.o. year old female here for follow up of sleep apnea, headaches, neck pain, possible seizure. S/p nephrectomy for renal mass, completed at [facility]. Denies further events. Prior event occurred as she was getting off the toilet, began having tunnel vision that waxed and waned and some difficulty with her movements. No overt syncope. No overt GTC activity. She laid down after this episode. When she woke up from this she continued to note an atypical sensation, waxing and waning tunnel vision so she went to the ER. She was evaluated by teleneurology and had episode while on telehealth with them, felt to have a focal seizure and started on Keppra. She is no longer taking Keppra, stopped on her own. Headaches continue to wax and wane. Typically pain is posterior in nature, other times she has noted a band like sensation. No clear migrainous features, vision changes or focal symptoms. Stress seems to play a role in headaches. Taking Ubrelvy or OTC medications prn with benefit. Neck pain is unchanged. Notes pain into her left shoulder. No clear radicular pain. Denies weakness. She is following with pain management, will be getting more injections in her cervical spine. Reports she is also going to start PT. Taking Robaxin with benefit as well. Bilateral hand numbness is unchanged, seems to wax and wane. Worse in the right hand, she has been dropping items out of her right hand. Symptoms do wake her up at

## 2024-09-03 NOTE — TELEPHONE ENCOUNTER
[place]am for [facility] to relay results.      Left  to return call for results.  Lilian Pretty MA

## 2024-09-04 ENCOUNTER — TELEPHONE (OUTPATIENT)
Dept: NEUROLOGY | Age: 64
End: 2024-09-04

## 2024-10-21 ENCOUNTER — OFFICE VISIT (OUTPATIENT)
Dept: NEUROSURGERY | Facility: CLINIC | Age: 64
End: 2024-10-21
Payer: MEDICARE

## 2024-10-21 VITALS — HEIGHT: 64 IN | BODY MASS INDEX: 48.83 KG/M2 | WEIGHT: 286 LBS

## 2024-10-21 DIAGNOSIS — E66.01 CLASS 3 SEVERE OBESITY DUE TO EXCESS CALORIES WITHOUT SERIOUS COMORBIDITY WITH BODY MASS INDEX (BMI) OF 45.0 TO 49.9 IN ADULT: ICD-10-CM

## 2024-10-21 DIAGNOSIS — G89.29 CHRONIC NECK PAIN: Primary | ICD-10-CM

## 2024-10-21 DIAGNOSIS — M54.12 CERVICAL RADICULOPATHY: ICD-10-CM

## 2024-10-21 DIAGNOSIS — M54.2 CHRONIC NECK PAIN: Primary | ICD-10-CM

## 2024-10-21 DIAGNOSIS — Z78.9 NONSMOKER: ICD-10-CM

## 2024-10-21 DIAGNOSIS — E66.813 CLASS 3 SEVERE OBESITY DUE TO EXCESS CALORIES WITHOUT SERIOUS COMORBIDITY WITH BODY MASS INDEX (BMI) OF 45.0 TO 49.9 IN ADULT: ICD-10-CM

## 2024-10-21 PROCEDURE — 1160F RVW MEDS BY RX/DR IN RCRD: CPT | Performed by: NURSE PRACTITIONER

## 2024-10-21 PROCEDURE — 99213 OFFICE O/P EST LOW 20 MIN: CPT | Performed by: NURSE PRACTITIONER

## 2024-10-21 PROCEDURE — 1159F MED LIST DOCD IN RCRD: CPT | Performed by: NURSE PRACTITIONER

## 2024-10-21 RX ORDER — IPRATROPIUM BROMIDE 42 UG/1
SPRAY, METERED NASAL
COMMUNITY
Start: 2024-09-25

## 2024-10-21 RX ORDER — ESOMEPRAZOLE MAGNESIUM 40 MG/1
40 CAPSULE, DELAYED RELEASE ORAL
COMMUNITY
Start: 2024-09-24
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
## 2024-10-21 NOTE — PATIENT INSTRUCTIONS
"  BMI for Adults  What is BMI?  Body mass index (BMI) is a number that is calculated from a person's weight and height. BMI can help estimate how much of a person's weight is composed of fat. BMI does not measure body fat directly. Rather, it is an alternative to procedures that directly measure body fat, which can be difficult and expensive.  BMI can help identify people who may be at higher risk for certain medical problems.  What are BMI measurements used for?  BMI is used as a screening tool to identify possible weight problems. It helps determine whether a person is obese, overweight, a healthy weight, or underweight.  BMI is useful for:  Identifying a weight problem that may be related to a medical condition or may increase the risk for medical problems.  Promoting changes, such as changes in diet and exercise, to help reach a healthy weight. BMI screening can be repeated to see if these changes are working.  How is BMI calculated?  BMI involves measuring your weight in relation to your height. Both height and weight are measured, and the BMI is calculated from those numbers. This can be done either in English ([place] or metric measurements. Note that charts and online BMI calculators are available to help you find your BMI quickly and easily without having to do these calculations yourself.  To calculate your BMI in English ([place] measurements:    Measure your weight in pounds (lb).  Multiply the number of pounds by 703.  For example, for a person who weighs 180 lb, multiply that number by 703, which equals 126,540.  Measure your height in inches. Then multiply that number by itself to get a measurement called \"inches squared.\"  For example, for a person who is 70 inches tall, the \"inches squared\" measurement is 70 inches x 70 inches, which equals 4,900 inches squared.  Divide the total from step 2 (number of lb x 703) by the total from step 3 (inches squared): 126,540 ÷ 4,900 = 25.8. This is your BMI.    To " "calculate your BMI in metric measurements:  Measure your weight in kilograms (kg).  Measure your height in meters (m). Then multiply that number by itself to get a measurement called \"meters squared.\"  For example, for a person who is 1.75 m tall, the \"meters squared\" measurement is 1.75 m x 1.75 m, which is equal to 3.1 meters squared.  Divide the number of kilograms (your weight) by the meters squared number. In this example: 70 ÷ 3.1 = 22.6. This is your BMI.  What do the results mean?  BMI charts are used to identify whether you are underweight, normal weight, overweight, or obese. The following guidelines will be used:  Underweight: BMI less than 18.5.  Normal weight: BMI between 18.5 and 24.9.  Overweight: BMI between 25 and 29.9.  Obese: BMI of 30 or above.  Keep these notes in mind:  Weight includes both fat and muscle, so someone with a muscular build, such as an athlete, may have a BMI that is higher than 24.9. In cases like these, BMI is not an accurate measure of body fat.  To determine if excess body fat is the cause of a BMI of 25 or higher, further assessments may need to be done by a health care provider.  BMI is usually interpreted in the same way for men and women.  Where to find more information  For more information about BMI, including tools to quickly calculate your BMI, go to these websites:  Centers for Disease Control and Prevention: www.cdc.gov  American Heart Association: www.heart.org  National Heart, Lung, and Blood [place]: www.nhlbi.nih.gov  Summary  Body mass index (BMI) is a number that is calculated from a person's weight and height.  BMI may help estimate how much of a person's weight is composed of fat. BMI can help identify those who may be at higher risk for certain medical problems.  BMI can be measured using English measurements or metric measurements.  BMI charts are used to identify whether you are underweight, normal weight, overweight, or obese.  This information is not " "intended to replace advice given to you by your health care provider. Make sure you discuss any questions you have with your health care provider.  Document Revised: 09/09/2020 Document Reviewed: 07/17/2020  [employer] Patient Education © 2021 [employer] https://www.nhlbi.nih.gov/files/docs/public/heart/dash_brief.pdf\">   DASH Eating Plan  DASH stands for Dietary Approaches to Stop Hypertension. The DASH eating plan is a healthy eating plan that has been shown to:  Reduce high blood pressure (hypertension).  Reduce your risk for type 2 diabetes, heart disease, and stroke.  Help with weight loss.  What are tips for following this plan?  Reading food labels  Check food labels for the amount of salt (sodium) per serving. Choose foods with less than 5 percent of the Daily Value of sodium. Generally, foods with less than 300 milligrams (mg) of sodium per serving fit into this eating plan.  To find whole grains, look for the word \"whole\" as the first word in the ingredient list.  Shopping  Buy products labeled as \"low-sodium\" or \"no salt added.\"  Buy fresh foods. Avoid canned foods and pre-made or frozen meals.  Cooking  Avoid adding salt when cooking. Use salt-free seasonings or herbs instead of table salt or sea salt. Check with your health care provider or pharmacist before using salt substitutes.  Do not young foods. Cook foods using healthy methods such as baking, boiling, grilling, roasting, and broiling instead.  Cook with heart-healthy oils, such as olive, canola, avocado, soybean, or sunflower oil.  Meal planning    Eat a balanced diet that includes:  4 or more servings of fruits and 4 or more servings of vegetables each day. Try to fill one-half of your plate with fruits and vegetables.  6-8 servings of whole grains each day.  Less than 6 oz (170 g) of lean meat, poultry, or fish each day. A 3-oz (85-g) serving of meat is about the same size as a deck of cards. One egg equals 1 oz (28 g).  2-3 servings of low-fat " dairy each day. One serving is 1 cup (237 mL).  1 serving of nuts, seeds, or beans 5 times each week.  2-3 servings of heart-healthy fats. Healthy fats called omega-3 fatty acids are found in foods such as walnuts, flaxseeds, fortified milks, and eggs. These fats are also found in cold-water fish, such as sardines, salmon, and mackerel.  Limit how much you eat of:  Canned or prepackaged foods.  Food that is high in trans fat, such as some fried foods.  Food that is high in saturated fat, such as fatty meat.  Desserts and other sweets, sugary drinks, and other foods with added sugar.  Full-fat dairy products.  Do not salt foods before eating.  Do not eat more than 4 egg yolks a week.  Try to eat at least 2 vegetarian meals a week.  Eat more home-cooked food and less restaurant, buffet, and fast food.    Lifestyle  When eating at a restaurant, ask that your food be prepared with less salt or no salt, if possible.  If you drink alcohol:  Limit how much you use to:  0-1 drink a day for women who are not pregnant.  0-2 drinks a day for men.  Be aware of how much alcohol is in your drink. In the [place] one drink equals one 12 oz bottle of beer (355 mL), one 5 oz glass of wine (148 mL), or one 1½ oz glass of hard liquor (44 mL).  General information  Avoid eating more than 2,300 mg of salt a day. If you have hypertension, you may need to reduce your sodium intake to 1,500 mg a day.  Work with your health care provider to maintain a healthy body weight or to lose weight. Ask what an ideal weight is for you.  Get at least 30 minutes of exercise that causes your heart to beat faster (aerobic exercise) most days of the week. Activities may include walking, swimming, or biking.  Work with your health care provider or dietitian to adjust your eating plan to your individual calorie needs.  What foods should I eat?  Fruits  All fresh, dried, or frozen fruit. Canned fruit in natural juice (without added sugar).  Vegetables  Fresh or  frozen vegetables (raw, steamed, roasted, or grilled). Low-sodium or reduced-sodium tomato and vegetable juice. Low-sodium or reduced-sodium tomato sauce and tomato paste. Low-sodium or reduced-sodium canned vegetables.  Grains  Whole-grain or whole-wheat bread. Whole-grain or whole-wheat pasta. Brown rice. Oatmeal. Quinoa. Bulgur. Whole-grain and low-sodium cereals. Lorri bread. Low-fat, low-sodium crackers. Whole-wheat flour tortillas.  Meats and other proteins  Skinless chicken or turkey. Ground chicken or turkey. Pork with fat trimmed off. Fish and seafood. Egg whites. Dried beans, peas, or lentils. Unsalted nuts, nut butters, and seeds. Unsalted canned beans. Lean cuts of beef with fat trimmed off. Low-sodium, lean precooked or cured meat, such as sausages or meat loaves.  Dairy  Low-fat (1%) or fat-free (skim) milk. Reduced-fat, low-fat, or fat-free cheeses. Nonfat, low-sodium ricotta or cottage cheese. Low-fat or nonfat yogurt. Low-fat, low-sodium cheese.  Fats and oils  Soft margarine without trans fats. Vegetable oil. Reduced-fat, low-fat, or light mayonnaise and salad dressings (reduced-sodium). Canola, safflower, olive, avocado, soybean, and sunflower oils. Avocado.  Seasonings and condiments  Herbs. Spices. Seasoning mixes without salt.  Other foods  Unsalted popcorn and pretzels. Fat-free sweets.  The items listed above may not be a complete list of foods and beverages you can eat. Contact a dietitian for more information.  What foods should I avoid?  Fruits  Canned fruit in a light or heavy syrup. Fried fruit. Fruit in cream or butter sauce.  Vegetables  Creamed or fried vegetables. Vegetables in a cheese sauce. Regular canned vegetables (not low-sodium or reduced-sodium). Regular canned tomato sauce and paste (not low-sodium or reduced-sodium). Regular tomato and vegetable juice (not low-sodium or reduced-sodium). Pickles. Olives.  Grains  Baked goods made with fat, such as croissants, muffins, or some  breads. Dry pasta or rice meal packs.  Meats and other proteins  Fatty cuts of meat. Ribs. Fried meat. Marie. Bologna, salami, and other precooked or cured meats, such as sausages or meat loaves. Fat from the back of a pig (fatback). Bratwurst. Salted nuts and seeds. Canned beans with added salt. Canned or smoked fish. Whole eggs or egg yolks. Chicken or turkey with skin.  Dairy  Whole or 2% milk, cream, and half-and-half. Whole or full-fat cream cheese. Whole-fat or sweetened yogurt. Full-fat cheese. Nondairy creamers. Whipped toppings. Processed cheese and cheese spreads.  Fats and oils  Butter. Stick margarine. Lard. Shortening. Ghee. Marie fat. Tropical oils, such as coconut, palm kernel, or palm oil.  Seasonings and condiments  Onion salt, garlic salt, seasoned salt, table salt, and sea salt. Worcestershire sauce. Tartar sauce. Barbecue sauce. Teriyaki sauce. Soy sauce, including reduced-sodium. Steak sauce. Canned and packaged gravies. Fish sauce. Oyster sauce. Cocktail sauce. Store-bought horseradish. Ketchup. Mustard. Meat flavorings and tenderizers. Bouillon cubes. Hot sauces. Pre-made or packaged marinades. Pre-made or packaged taco seasonings. Relishes. Regular salad dressings.  Other foods  Salted popcorn and pretzels.  The items listed above may not be a complete list of foods and beverages you should avoid. Contact a dietitian for more information.  Where to find more information  National Heart, Lung, and Blood [place]: www.nhlbi.nih.gov  American Heart Association: www.heart.org  Academy of Nutrition and Dietetics: www.eatright.org  National Kidney Foundation: www.kidney.org  Summary  The DASH eating plan is a healthy eating plan that has been shown to reduce high blood pressure (hypertension). It may also reduce your risk for type 2 diabetes, heart disease, and stroke.  When on the DASH eating plan, aim to eat more fresh fruits and vegetables, whole grains, lean proteins, low-fat dairy, and  heart-healthy fats.  With the DASH eating plan, you should limit salt (sodium) intake to 2,300 mg a day. If you have hypertension, you may need to reduce your sodium intake to 1,500 mg a day.  Work with your health care provider or dietitian to adjust your eating plan to your individual calorie needs.  This information is not intended to replace advice given to you by your health care provider. Make sure you discuss any questions you have with your health care provider.  Document Revised: 11/20/2020 Document Reviewed: 11/20/2020  [employer] Patient Education © 2021 [employer] High-Protein and High-Calorie Diet  Eating high-protein and high-calorie foods can help you to gain weight, heal after an injury, and recover after an illness or surgery. The specific amount of daily protein and calories you need depends on:  Your body weight.  The reason this diet is recommended for you.  What is my plan?  Generally, a high-protein, high-calorie diet involves:  Eating 250-500 extra calories each day.  Making sure that you get enough of your daily calories from protein. Ask your health care provider how many of your calories should come from protein.  Talk with a health care provider, such as a diet and nutrition specialist (dietitian), about how much protein and how many calories you need each day. Follow the diet as directed by your health care provider.  What are tips for following this plan?    Preparing meals  Add whole milk, half-and-half, or heavy cream to cereal, pudding, soup, or hot cocoa.  Add whole milk to instant breakfast drinks.  Add peanut butter to oatmeal or smoothies.  Add powdered milk to baked goods, smoothies, or milkshakes.  Add powdered milk, cream, or butter to mashed potatoes.  Add cheese to cooked vegetables.  Make whole-milk yogurt parfaits. Top them with granola, fruit, or nuts.  Add cottage cheese to your fruit.  Add avocado, cheese, or both to sandwiches or salads.  Add meat, poultry, or seafood  to rice, pasta, casseroles, salads, and soups.  Use mayonnaise when making egg salad, chicken salad, or tuna salad.  Use peanut butter as a dip for vegetables or as a topping for pretzels, celery, or crackers.  Add beans to casseroles, dips, and spreads.  Add pureed beans to sauces and soups.  Replace calorie-free drinks with calorie-containing drinks, such as milk and fruit juice.  Replace water with milk or heavy cream when making foods such as oatmeal, pudding, or cocoa.  General instructions  Ask your health care provider if you should take a nutritional supplement.  Try to eat six small meals each day instead of three large meals.  Eat a balanced diet. In each meal, include one food that is high in protein.  Keep nutritious snacks available, such as nuts, trail mixes, dried fruit, and yogurt.  If you have kidney disease or diabetes, talk with your health care provider about how much protein is safe for you. Too much protein may put extra stress on your kidneys.  Drink your calories. Choose high-calorie drinks and have them after your meals.  What high-protein foods should I eat?    Vegetables  Soybeans. Peas.  Grains  Quinoa. Bulgur wheat.  Meats and other proteins  Beef, pork, and poultry. Fish and seafood. Eggs. Tofu. Textured vegetable protein (TVP). Peanut butter. Nuts and seeds. Dried beans. Protein powders.  Dairy  Whole milk. Whole-milk yogurt. Powdered milk. Cheese. Cottage Cheese. Eggnog.  Beverages  High-protein supplement drinks. Soy milk.  Other foods  Protein bars.  The items listed above may not be a complete list of high-protein foods and beverages. Contact a dietitian for more options.  What high-calorie foods should I eat?  Fruits  Dried fruit. Fruit leather. Canned fruit in syrup. Fruit juice. Avocado.  Vegetables  Vegetables cooked in oil or butter. Fried potatoes.  Grains  Pasta. Quick breads. Muffins. Pancakes. Ready-to-eat cereal.  Meats and other proteins  Peanut butter. Nuts and  seeds.  Dairy  Heavy cream. Whipped cream. Cream cheese. Sour cream. Ice cream. Custard. Pudding.  Beverages  Meal-replacement beverages. Nutrition shakes. Fruit juice. Sugar-sweetened soft drinks.  Seasonings and condiments  Salad dressing. Mayonnaise. Candelario sauce. Fruit preserves or jelly. Honey. Syrup.  Sweets and desserts  Cake. Cookies. Pie. Pastries. Candy bars. Chocolate.  Fats and oils  Butter or margarine. Oil. Gravy.  Other foods  Meal-replacement bars.  The items listed above may not be a complete list of high-calorie foods and beverages. Contact a dietitian for more options.  Summary  A high-protein, high-calorie diet can help you gain weight or heal faster after an injury, illness, or surgery.  To increase your protein and calories, add ingredients such as whole milk, peanut butter, cheese, beans, meat, or seafood to meal items.  To get enough extra calories each day, include high-calorie foods and beverages at each meal.  Adding a high-calorie drink or shake can be an easy way to help you get enough calories each day. Talk with your healthcare provider or dietitian about the best options for you.  This information is not intended to replace advice given to you by your health care provider. Make sure you discuss any questions you have with your health care provider.  Document Revised: 11/30/2018 Document Reviewed: 10/30/2018  [employer] Patient Education © 2021 [employer] Advance Care Planning and Advance Directives     You make decisions on a daily basis - decisions about where you want to live, your career, your home, your life. Perhaps one of the most important decisions you face is your choice for future medical care. Take time to talk with your family and your healthcare team and start planning today.  Advance Care Planning is a process that can help you:  Understand possible future healthcare decisions in light of your own experiences  Reflect on those decision in light of your goals and  values  Discuss your decisions with those closest to you and the healthcare professionals that care for you  Make a plan by creating a document that reflects your wishes    Surrogate Decision Maker  In the event of a medical emergency, which has left you unable to communicate or to make your own decisions, you would need someone to make decisions for you.  It is important to discuss your preferences for medical treatment with this person while you are in good health.     Qualities of a surrogate decision maker:  Willing to take on this role and responsibility  Knows what you want for future medical care  Willing to follow your wishes even if they don't agree with them  Able to make difficult medical decisions under stressful circumstances    Advance Directives  These are legal documents you can create that will guide your healthcare team and decision maker(s) when needed. These documents can be stored in the electronic medical record.    Living Will - a legal document to guide your care if you have a terminal condition or a serious illness and are unable to communicate. States vary by statute in document names/types, but most forms may include one or more of the following:        -  Directions regarding life-prolonging treatments        -  Directions regarding artificially provided nutrition/hydration        -  Choosing a healthcare decision maker        -  Direction regarding organ/tissue donation    Durable Power of  for Healthcare - this document names an -in-fact to make medical decisions for you, but it may also allow this person to make personal and financial decisions for you. Please seek the advice of an  if you need this type of document.    **Advance Directives are not required and no one may discriminate against you if you do not sign one.    Medical Orders  Many states allow specific forms/orders signed by your physician to record your wishes for medical treatment in your current  [facility]. This form, signed in personal communication with your physician, addresses resuscitation and other medical interventions that you may or may not want.      For more information or to schedule a time with a [facility] Advance Care Planning Facilitator contact: [facility]/ACP or call [phone] and someone will contact you directly.

## 2024-11-18 ENCOUNTER — HOSPITAL ENCOUNTER (OUTPATIENT)
Dept: MRI IMAGING | Facility: HOSPITAL | Age: 64
Discharge: HOME | End: 2024-11-18
Admitting: NURSE PRACTITIONER
Payer: OTHER MISCELLANEOUS

## 2024-11-18 DIAGNOSIS — M54.12 CERVICAL RADICULOPATHY: ICD-10-CM

## 2024-11-18 DIAGNOSIS — G89.29 CHRONIC NECK PAIN: ICD-10-CM

## 2024-11-18 DIAGNOSIS — M54.2 CHRONIC NECK PAIN: ICD-10-CM

## 2024-11-18 PROCEDURE — 72141 MRI NECK SPINE W/O DYE: CPT

## 2024-11-20 ENCOUNTER — TELEPHONE (OUTPATIENT)
Dept: NEUROSURGERY | Facility: CLINIC | Age: 64
End: 2024-11-20
Payer: OTHER MISCELLANEOUS

## 2024-11-20 NOTE — TELEPHONE ENCOUNTER
----- Message from Paxton Perez sent at 11/19/2024  5:35 PM CST -----  She does have some degeneration and arthritis in her neck.  Recommend that she try pain management again prior to seeing Dr. Wills.  ----- Message -----  From: Interface, Rad Results [place] In  Sent: 11/19/2024   2:40 PM CST  To: Paxton Perez, APRN

## 2024-11-21 NOTE — TELEPHONE ENCOUNTER
Placed a call to inform pt of imaging results and recommendations. Pt stated she is already in pain management. Informed pt if there is anything else she needs to get done before her appt with Dr MCNEILL we will give pt a call back. Ended call with pt understanding and acknowledgment.

## 2025-01-28 ENCOUNTER — OFFICE VISIT (OUTPATIENT)
Dept: NEUROSURGERY | Facility: CLINIC | Age: 65
End: 2025-01-28
Payer: OTHER MISCELLANEOUS

## 2025-01-28 VITALS — WEIGHT: 275 LBS | HEIGHT: 64 IN | BODY MASS INDEX: 46.95 KG/M2

## 2025-01-28 DIAGNOSIS — M54.2 CHRONIC NECK PAIN: ICD-10-CM

## 2025-01-28 DIAGNOSIS — E66.813 CLASS 3 SEVERE OBESITY DUE TO EXCESS CALORIES WITHOUT SERIOUS COMORBIDITY WITH BODY MASS INDEX (BMI) OF 45.0 TO 49.9 IN ADULT: ICD-10-CM

## 2025-01-28 DIAGNOSIS — M50.30 DEGENERATION OF CERVICAL INTERVERTEBRAL DISC: Primary | ICD-10-CM

## 2025-01-28 DIAGNOSIS — M54.12 CERVICAL RADICULOPATHY: ICD-10-CM

## 2025-01-28 DIAGNOSIS — E66.01 CLASS 3 SEVERE OBESITY DUE TO EXCESS CALORIES WITHOUT SERIOUS COMORBIDITY WITH BODY MASS INDEX (BMI) OF 45.0 TO 49.9 IN ADULT: ICD-10-CM

## 2025-01-28 DIAGNOSIS — Z78.9 NONSMOKER: ICD-10-CM

## 2025-01-28 DIAGNOSIS — G89.29 CHRONIC NECK PAIN: ICD-10-CM

## 2025-01-28 PROCEDURE — 99214 OFFICE O/P EST MOD 30 MIN: CPT | Performed by: NEUROLOGICAL SURGERY
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
## 2025-01-28 NOTE — PATIENT INSTRUCTIONS
"PATIENT TO CONTINUE TO FOLLOW UP WITH HER PRIMARY CARE PROVIDER FOR YEARLY PHYSICAL EXAMS TO ENSURE COMPLETE HEALTH MAINTENANCE      BMI for Adults  Body mass index (BMI) is a number found using a person's weight and height. BMI can help tell how much of a person's weight is made up of fat. BMI does not measure body fat directly. It is used instead of tests that directly measure body fat, which can be difficult and expensive.  What are BMI measurements used for?  BMI is useful to:  Find out if your weight puts you at higher risk for medical problems.  Help recommend changes, such as in diet and exercise. This can help you reach a healthy weight. BMI screening can be done again to see if these changes are working.  How is BMI calculated?  Your height and weight are measured. The BMI is found from those numbers. This can be done with [place] or metric measurements. Note that charts and online BMI calculators are available to help you find your BMI quickly and easily without doing these calculations.  To calculate your BMI in [place] measurements:  Measure your weight in pounds (lb).  Multiply the number of pounds by 703.  So, for an adult who weighs 150 lb, multiply that number by 703: 150 x 703, which equals 105,450.  Measure your height in inches. Then multiply that number by itself to get a measurement called \"inches squared.\"  So, for an adult who is 70 inches tall, the \"inches squared\" measurement is 70 inches x 70 inches, which equals 4,900 inches squared.  Divide the total from step 2 (number of lb x 703) by the total from step 3 (inches squared): 105,450 ÷ 4,900 = 21.5. This is your BMI.  To calculate your BMI in metric measurements:    Measure your weight in kilograms (kg).  For this example, the weight is 70 kg.  Measure your height in meters (m). Then multiply that number by itself to get a measurement called \"meters squared.\"  So, for an adult who is 1.75 m tall, the \"meters squared\" measurement is 1.75 m x 1.75 " m, which equals 3.1 meters squared.  Divide the number of kilograms (your weight) by the meters squared number. In this example: 70 ÷ 3.1 = 22.6. This is your BMI.  What do the results mean?  BMI charts are used to see if you are underweight, normal weight, overweight, or obese. The following guidelines will be used:  Underweight: BMI less than 18.5.  Normal weight: BMI between 18.5 and 24.9.  Overweight: BMI between 25 and 29.9.  Obese: BMI of 30 or above.  BMI is a tool and cannot diagnose a condition. Talk with your health care provider about what your BMI means for you. Keep these notes in mind:  Weight includes fat and muscle. Someone with a muscular build, such as an athlete, may have a BMI that is higher than 24.9. In cases like these, BMI is not a correct measure of body fat.  If you have a BMI of 25 or higher, your provider may need to do more testing to find out if excess body fat is the cause.  BMI is measured the same way for males and females. Females usually have more body fat than males of the same height and weight.  Where to find more information  For more information about BMI, including tools to quickly find your BMI, go to:  Centers for Disease Control and Prevention: cdc.gov  American Heart Association: heart.org  National Heart, Lung, and Blood [place]: nhlbi.nih.gov  This information is not intended to replace advice given to you by your health care provider. Make sure you discuss any questions you have with your health care provider.  Document Revised: 09/07/2023 Document Reviewed: 08/31/2023  [employer] Patient Education © 2024 [employer]DASH Eating Plan  DASH stands for Dietary Approaches to Stop Hypertension. The DASH eating plan is a healthy eating plan that has been shown to:  Lower high blood pressure (hypertension).  Reduce your risk for type 2 diabetes, heart disease, and stroke.  Help with weight loss.  What are tips for following this plan?  Reading food labels  Check food labels  "for the amount of salt (sodium) per serving. Choose foods with less than 5 percent of the Daily Value (DV) of sodium. In general, foods with less than 300 milligrams (mg) of sodium per serving fit into this eating plan.  To find whole grains, look for the word \"whole\" as the first word in the ingredient list.  Shopping  Buy products labeled as \"low-sodium\" or \"no salt added.\"  Buy fresh foods. Avoid canned foods and pre-made or frozen meals.  Cooking  Try not to add salt when you cook. Use salt-free seasonings or herbs instead of table salt or sea salt. Check with your health care provider or pharmacist before using salt substitutes.  Do not young foods. Cook foods in healthy ways, such as baking, boiling, grilling, roasting, or broiling.  Cook using oils that are good for your heart. These include olive, canola, avocado, soybean, and sunflower oil.  Meal planning    Eat a balanced diet. This should include:  4 or more servings of fruits and 4 or more servings of vegetables each day. Try to fill half of your plate with fruits and vegetables.  6-8 servings of whole grains each day.  6 or less servings of lean meat, poultry, or fish each day. 1 oz is 1 serving. A 3 oz (85 g) serving of meat is about the same size as the palm of your hand. One egg is 1 oz (28 g).  2-3 servings of low-fat dairy each day. One serving is 1 cup (237 mL).  1 serving of nuts, seeds, or beans 5 times each week.  2-3 servings of heart-healthy fats. Healthy fats called omega-3 fatty acids are found in foods such as walnuts, flaxseeds, fortified milks, and eggs. These fats are also found in cold-water fish, such as sardines, salmon, and mackerel.  Limit how much you eat of:  Canned or prepackaged foods.  Food that is high in trans fat, such as fried foods.  Food that is high in saturated fat, such as fatty meat.  Desserts and other sweets, sugary drinks, and other foods with added sugar.  Full-fat dairy products.  Do not salt foods before " eating.  Do not eat more than 4 egg yolks a week.  Try to eat at least 2 vegetarian meals a week.  Eat more home-cooked food and less restaurant, buffet, and fast food.  Lifestyle  When eating at a restaurant, ask if your food can be made with less salt or no salt.  If you drink alcohol:  Limit how much you have to:  0-1 drink a day if you are female.  0-2 drinks a day if you are male.  Know how much alcohol is in your drink. In the [place] one drink is one 12 oz bottle of beer (355 mL), one 5 oz glass of wine (148 mL), or one 1½ oz glass of hard liquor (44 mL).  General information  Avoid eating more than 2,300 mg of salt a day. If you have hypertension, you may need to reduce your sodium intake to 1,500 mg a day.  Work with your provider to stay at a healthy body weight or lose weight. Ask what the best weight range is for you.  On most days of the week, get at least 30 minutes of exercise that causes your heart to beat faster. This may include walking, swimming, or biking.  Work with your provider or dietitian to adjust your eating plan to meet your specific calorie needs.  What foods should I eat?  Fruits  All fresh, dried, or frozen fruit. Canned fruits that are in their natural juice and do not have sugar added to them.  Vegetables  Fresh or frozen vegetables that are raw, steamed, roasted, or grilled. Low-sodium or reduced-sodium tomato and vegetable juice. Low-sodium or reduced-sodium tomato sauce and tomato paste. Low-sodium or reduced-sodium canned vegetables.  Grains  Whole-grain or whole-wheat bread. Whole-grain or whole-wheat pasta. Brown rice. Oatmeal. Quinoa. Bulgur. Whole-grain and low-sodium cereals. Lorri bread. Low-fat, low-sodium crackers. Whole-wheat flour tortillas.  Meats and other proteins  Skinless chicken or turkey. Ground chicken or turkey. Pork with fat trimmed off. Fish and seafood. Egg whites. Dried beans, peas, or lentils. Unsalted nuts, nut butters, and seeds. Unsalted canned beans. Lean  cuts of beef with fat trimmed off. Low-sodium, lean precooked or cured meat, such as sausages or meat loaves.  Dairy  Low-fat (1%) or fat-free (skim) milk. Reduced-fat, low-fat, or fat-free cheeses. Nonfat, low-sodium ricotta or cottage cheese. Low-fat or nonfat yogurt. Low-fat, low-sodium cheese.  Fats and oils  Soft margarine without trans fats. Vegetable oil. Reduced-fat, low-fat, or light mayonnaise and salad dressings (reduced-sodium). Canola, safflower, olive, avocado, soybean, and sunflower oils. Avocado.  Seasonings and condiments  Herbs. Spices. Seasoning mixes without salt.  Other foods  Unsalted popcorn and pretzels. Fat-free sweets.  The items listed above may not be all the foods and drinks you can have. Talk to a dietitian to learn more.  What foods should I avoid?  Fruits  Canned fruit in a light or heavy syrup. Fried fruit. Fruit in cream or butter sauce.  Vegetables  Creamed or fried vegetables. Vegetables in a cheese sauce. Regular canned vegetables that are not marked as low-sodium or reduced-sodium. Regular canned tomato sauce and paste that are not marked as low-sodium or reduced-sodium. Regular tomato and vegetable juices that are not marked as low-sodium or reduced-sodium. Pickles. Olives.  Grains  Baked goods made with fat, such as croissants, muffins, or some breads. Dry pasta or rice meal packs.  Meats and other proteins  Fatty cuts of meat. Ribs. Fried meat. Marie. Bologna, salami, and other precooked or cured meats, such as sausages or meat loaves, that are not lean and low in sodium. Fat from the back of a pig (fatback). Bratwurst. Salted nuts and seeds. Canned beans with added salt. Canned or smoked fish. Whole eggs or egg yolks. Chicken or turkey with skin.  Dairy  Whole or 2% milk, cream, and half-and-half. Whole or full-fat cream cheese. Whole-fat or sweetened yogurt. Full-fat cheese. Nondairy creamers. Whipped toppings. Processed cheese and cheese spreads.  Fats and oils  Butter.  Stick margarine. Lard. Shortening. Ghee. Marie fat. Tropical oils, such as coconut, palm kernel, or palm oil.  Seasonings and condiments  Onion salt, garlic salt, seasoned salt, table salt, and sea salt. Worcestershire sauce. Tartar sauce. Barbecue sauce. Teriyaki sauce. Soy sauce, including reduced-sodium soy sauce. Steak sauce. Canned and packaged gravies. Fish sauce. Oyster sauce. Cocktail sauce. Store-bought horseradish. Ketchup. Mustard. Meat flavorings and tenderizers. Bouillon cubes. Hot sauces. Pre-made or packaged marinades. Pre-made or packaged taco seasonings. Relishes. Regular salad dressings.  Other foods  Salted popcorn and pretzels.  The items listed above may not be all the foods and drinks you should avoid. Talk to a dietitian to learn more.  Where to find more information  National Heart, Lung, and Blood [place] (NHLBI): nhlbi.nih.gov  American Heart Association (AHA): heart.org  Academy of Nutrition and Dietetics: eatright.org  National Kidney Foundation (NKF): kidney.org  This information is not intended to replace advice given to you by your health care provider. Make sure you discuss any questions you have with your health care provider.  Document Revised: 01/04/2024 Document Reviewed: 01/04/2024  [employer] Patient Education © 2024 [employer]

## 2025-02-24 ENCOUNTER — OFFICE VISIT (OUTPATIENT)
Dept: CARDIOLOGY | Facility: CLINIC | Age: 65
End: 2025-02-24
Payer: MEDICARE

## 2025-02-24 VITALS
WEIGHT: 277 LBS | HEIGHT: 63 IN | BODY MASS INDEX: 49.08 KG/M2 | OXYGEN SATURATION: 98 % | DIASTOLIC BLOOD PRESSURE: 78 MMHG | HEART RATE: 57 BPM | SYSTOLIC BLOOD PRESSURE: 148 MMHG

## 2025-02-24 DIAGNOSIS — R07.89 ATYPICAL CHEST PAIN: Primary | ICD-10-CM

## 2025-02-24 DIAGNOSIS — R06.02 SHORTNESS OF BREATH: ICD-10-CM

## 2025-02-24 DIAGNOSIS — I10 PRIMARY HYPERTENSION: ICD-10-CM

## 2025-02-24 DIAGNOSIS — R00.2 PALPITATIONS: ICD-10-CM

## 2025-02-24 DIAGNOSIS — E66.01 MORBID (SEVERE) OBESITY DUE TO EXCESS CALORIES: ICD-10-CM

## 2025-02-24 PROCEDURE — G2211 COMPLEX E/M VISIT ADD ON: HCPCS | Performed by: NURSE PRACTITIONER

## 2025-02-24 PROCEDURE — 3078F DIAST BP <80 MM HG: CPT | Performed by: NURSE PRACTITIONER

## 2025-02-24 PROCEDURE — 3077F SYST BP >= 140 MM HG: CPT | Performed by: NURSE PRACTITIONER

## 2025-02-24 PROCEDURE — 1160F RVW MEDS BY RX/DR IN RCRD: CPT | Performed by: NURSE PRACTITIONER

## 2025-02-24 PROCEDURE — 1159F MED LIST DOCD IN RCRD: CPT | Performed by: NURSE PRACTITIONER

## 2025-02-24 PROCEDURE — 99214 OFFICE O/P EST MOD 30 MIN: CPT | Performed by: NURSE PRACTITIONER

## 2025-02-24 NOTE — PROGRESS NOTES
"    Subjective:     Encounter Date:02/24/2025      Patient ID: Patti Gilbert is a 64 y.o. female.    Chief Complaint:\"I've had palpitations\"  Chest Pain   This is a recurrent problem. The current episode started more than 1 year ago. The onset quality is sudden. The problem occurs intermittently. The problem has been waxing and waning. The pain is present in the lateral region. Associated symptoms include palpitations and shortness of breath. Pertinent negatives include no cough, dizziness, irregular heartbeat, malaise/fatigue, near-syncope, orthopnea, PND, sputum production, syncope or weakness.     Patient presents today as a follow up. She currently follows with Dr. Summers for management of PSVT. She has had extensive cardiac workup for atypical chest pain which has included multiple stress tests, all of which have been negative and until 7/2024 at which time results noted anterior and apical wall ischemia. She has had 2 separate CT angiogram of the coronaries, one of which was completed following her abnormal stress test, both of which noted normal coronaries with a calcium score of 0. She has also had multiple 2D echos due to complaints of dyspnea with results being normal. She has also had multiple holter monitors placed due to complaints of palpitations with most being in 9/2024 and noted 2 episodes of PSVT with no symptoms reported.     She reports she continues to have palpitations on occasion. She had an episode last week that did not subside after taking 4 baby ASA therefore she took a Nitro and her palpitations subsided. She also continues to report nonexertional left sided chest pain which she describes as a \"catch\" in her lateral chest. She continues to report shortness of breath can occur with and without exertion as well and subsides with rest or use of her inhaler. She monitors her BP at home and reports readings in the 140/80s.      The following portions of the patient's history were reviewed and " updated as appropriate: allergies, current medications, past family history, past medical history, past social history, past surgical history and problem list.    Allergies   Allergen Reactions    Cefdinir Shortness Of Breath and Rash    Latex Itching    Celecoxib Other (See Comments)    Empagliflozin Unknown - High Severity    Levofloxacin Unknown (See Comments)     Patient stated feeling like she was dying.    Acetaminophen Rash     Other reaction(s): CAUSES ITCHING    Hydrocodone-Acetaminophen Itching and Rash     7.5 mg tabs    Rosuvastatin Hallucinations       Current Outpatient Medications:     albuterol sulfate  (90 Base) MCG/ACT inhaler, Inhale 2 puffs Every 6 (Six) Hours As Needed for Wheezing or Shortness of Air., Disp: , Rfl:     amLODIPine (NORVASC) 10 MG tablet, , Disp: , Rfl:     aspirin 81 MG EC tablet, Take 1 tablet by mouth Daily., Disp: , Rfl:     atenolol (TENORMIN) 50 MG tablet, , Disp: , Rfl:     cetirizine (zyrTEC) 10 MG tablet, Take 1 tablet by mouth Daily As Needed for Allergies., Disp: , Rfl:     cyclobenzaprine (FLEXERIL) 10 MG tablet, , Disp: , Rfl:     esomeprazole (nexIUM) 40 MG capsule, Take 1 capsule by mouth Every Morning Before Breakfast., Disp: , Rfl:     gabapentin (NEURONTIN) 300 MG capsule, Take 1 capsule twice a day by oral route for 30 days., Disp: , Rfl:     HYDROcodone-acetaminophen (NORCO) 7.5-325 MG per tablet, Take 1 tablet by mouth Every 6 (Six) Hours As Needed for Moderate Pain., Disp: , Rfl:     lisinopril (PRINIVIL,ZESTRIL) 10 MG tablet, Take 0.5 tablets every day by oral route., Disp: , Rfl:     montelukast (SINGULAIR) 10 MG tablet, Take 1 tablet by mouth Daily As Needed., Disp: , Rfl:     nitroglycerin (NITROSTAT) 0.4 MG SL tablet, Place 1 tablet under the tongue., Disp: , Rfl:     tiZANidine (ZANAFLEX) 4 MG tablet, 0.5 tablets., Disp: , Rfl:   Past Medical History:   Diagnosis Date    Arthritis     Asthma     Atrial septal defect     [id number]    Breast cancer      Chronic kidney disease Few months ago    Have a cancerous tumor on my right kidney    COPD (chronic obstructive pulmonary disease)     CTS (carpal tunnel syndrome)     Diabetes mellitus     H/O cardiac catheterization     Headache     Heart valve disease Years ago    Dr. Dickson    History of left fractured kneecap     History of transfusion     Hypertension     Low back pain     Mini Stroke     Morbid obesity     Myocardial infarction     Peripheral neuropathy     Recretional Drug use     Right knee injury     Sleep apnea Months ago    Dr. Summers referred me to continue treatment       Social History     Socioeconomic History    Marital status:    Tobacco Use    Smoking status: Never     Passive exposure: Never    Smokeless tobacco: Never   Vaping Use    Vaping status: Never Used   Substance and Sexual Activity    Alcohol use: Not Currently     Alcohol/week: 2.0 standard drinks of alcohol     Comment: None since 9-    Drug use: Never    Sexual activity: Yes     Partners: Male     Birth control/protection: Post-menopausal, Tubal ligation       Review of Systems   Constitutional: Negative for malaise/fatigue, weight gain and weight loss.   Cardiovascular:  Positive for chest pain and palpitations. Negative for dyspnea on exertion, irregular heartbeat, leg swelling, near-syncope, orthopnea, paroxysmal nocturnal dyspnea and syncope.   Respiratory:  Positive for shortness of breath. Negative for cough, sleep disturbances due to breathing, sputum production and wheezing.    Skin:  Negative for dry skin, flushing, itching and rash.   Gastrointestinal:  Negative for hematemesis and hematochezia.   Neurological:  Negative for dizziness, light-headedness, loss of balance and weakness.   All other systems reviewed and are negative.         Objective:     Vitals reviewed.   Constitutional:       General: Not in acute distress.     Appearance: Healthy appearance. Well-developed. Morbidly obese. Not diaphoretic.  "  Eyes:      General: No scleral icterus.     Conjunctiva/sclera: Conjunctivae normal.      Pupils: Pupils are equal, round, and reactive to light.   HENT:      Head: Normocephalic.    Mouth/Throat:      Pharynx: No oropharyngeal exudate.   Neck:      Vascular: No JVR.   Pulmonary:      Effort: Pulmonary effort is normal. No respiratory distress.      Breath sounds: Normal breath sounds. No wheezing. No rhonchi. No rales.   Chest:      Chest wall: Not tender to palpatation.   Cardiovascular:      Normal rate. Regular rhythm.   Pulses:     Intact distal pulses.   Edema:     Peripheral edema absent.   Abdominal:      General: Bowel sounds are normal. There is no distension.      Palpations: Abdomen is soft.      Tenderness: There is no abdominal tenderness.   Musculoskeletal: Normal range of motion.      Cervical back: Normal range of motion and neck supple. Skin:     General: Skin is warm and dry.      Coloration: Skin is not pale.      Findings: No erythema or rash.   Neurological:      Mental Status: Alert, oriented to person, place, and time and oriented to person, place and time.      Deep Tendon Reflexes: Reflexes are normal and symmetric.   Psychiatric:         Behavior: Behavior normal.           Procedures  /78 (BP Location: Left arm, Patient Position: Sitting, Cuff Size: Other (Comment)) Comment (Cuff Size): XL adult  Pulse 57   Ht 160 cm (63\")   Wt 126 kg (277 lb)   LMP  (LMP Unknown)   SpO2 98%   BMI 49.07 kg/m²     Lab Review:   I have reviewed previous office notes, recent labs and recent cardiac testing.   Stress echo 7/25/24:  Interpretation Summary         Abnormal stress echo with echocardiographic evidence for myocardial ischemia located in the anterior wall and apical wall.    Left ventricular ejection fraction appears to be 56 - 60%.  Results for orders placed during the hospital encounter of 08/09/24    Adult Transthoracic Echo Complete w/ Color, Spectral and Contrast if necessary per " protocol    Interpretation Summary    Left ventricular ejection fraction appears to be 56 - 60%.    Left ventricular diastolic function was normal.    Estimated right ventricular systolic pressure from tricuspid regurgitation is normal (<35 mmHg).    Normal global longitudinal LV strain (GLS) = -17.9%          Assessment:          Diagnosis Plan   1. Atypical chest pain        2. Palpitations        3. Shortness of breath        4. Morbid (severe) obesity due to excess calories        5. Primary hypertension                 Plan:       1 Atypical chest pain- noncardiac with calcium score of 0 per CTA of coronaries in 2024. Defer further noncardiac chest pain workup to PCP.   2. Palpitations- no rhythm-symptom correlation per preliminary holter results. Remains on Atenolol  3. Dyspnea-likely multifactorial due to obesity and deconditioning.   4. BMI- Class 3 Severe Obesity (BMI >=40). Obesity-related health conditions include the following: hypertension, diabetes mellitus, and dyslipidemias. Obesity is unchanged. BMI is is above average; BMI management plan is completed. We discussed portion control and increasing exercise.  5. HTN- slightly elevated today. Managed per PCP.       Follow up in 6 months or sooner if symptoms worsen.     I spent 30 minutes caring for Patti on this date of service. This time includes time spent by me in the following activities:preparing for the visit, reviewing tests, obtaining and/or reviewing a separately obtained history, performing a medically appropriate examination and/or evaluation , counseling and educating the patient/family/caregiver, ordering medications, tests, or procedures, referring and communicating with other health care professionals , and documenting information in the medical record

## 2025-07-24 ENCOUNTER — TRANSCRIBE ORDERS (OUTPATIENT)
Dept: ADMINISTRATIVE | Facility: HOSPITAL | Age: 65
End: 2025-07-24
Payer: OTHER MISCELLANEOUS

## 2025-07-24 DIAGNOSIS — Z78.0 POSTMENOPAUSAL STATUS: Primary | ICD-10-CM

## 2025-08-21 ENCOUNTER — HOSPITAL ENCOUNTER (OUTPATIENT)
Dept: BONE DENSITY | Facility: HOSPITAL | Age: 65
Discharge: HOME | End: 2025-08-21
Admitting: NURSE PRACTITIONER
Payer: MEDICARE

## 2025-08-21 DIAGNOSIS — Z78.0 POSTMENOPAUSAL STATUS: ICD-10-CM

## 2025-08-21 PROCEDURE — 77080 DXA BONE DENSITY AXIAL: CPT

## 2025-08-25 ENCOUNTER — OFFICE VISIT (OUTPATIENT)
Dept: CARDIOLOGY | Facility: CLINIC | Age: 65
End: 2025-08-25
Payer: MEDICARE

## 2025-08-25 VITALS
BODY MASS INDEX: 49.43 KG/M2 | DIASTOLIC BLOOD PRESSURE: 84 MMHG | HEART RATE: 54 BPM | SYSTOLIC BLOOD PRESSURE: 151 MMHG | WEIGHT: 279 LBS | HEIGHT: 63 IN

## 2025-08-25 DIAGNOSIS — R07.89 CHEST PAIN, ATYPICAL: ICD-10-CM

## 2025-08-25 DIAGNOSIS — Z87.74 HX OF PERCUTANEOUS TRANSCATHETER CLOSURE OF CONGENITAL ASD: ICD-10-CM

## 2025-08-25 DIAGNOSIS — I10 PRIMARY HYPERTENSION: Primary | ICD-10-CM

## 2025-08-25 DIAGNOSIS — R00.2 PALPITATIONS: ICD-10-CM

## 2025-08-25 DIAGNOSIS — I47.10 PAROXYSMAL SVT (SUPRAVENTRICULAR TACHYCARDIA): ICD-10-CM

## 2025-08-25 PROCEDURE — 3079F DIAST BP 80-89 MM HG: CPT | Performed by: INTERNAL MEDICINE

## 2025-08-25 PROCEDURE — 93000 ELECTROCARDIOGRAM COMPLETE: CPT | Performed by: INTERNAL MEDICINE

## 2025-08-25 PROCEDURE — 3077F SYST BP >= 140 MM HG: CPT | Performed by: INTERNAL MEDICINE

## 2025-08-25 PROCEDURE — 99212 OFFICE O/P EST SF 10 MIN: CPT | Performed by: INTERNAL MEDICINE

## (undated) DEVICE — DRAPE,EXTREMITY,89X128,STERILE: Brand: MEDLINE

## (undated) DEVICE — SUTURE ETHILON SZ 3-0 L18IN NONABSORBABLE BLK L24MM FS-1 3/8 663G

## (undated) DEVICE — SUTURE VICRYL + SZ 3-0 L27IN ABSRB UD L26MM SH 1/2 CIR VCP416H

## (undated) DEVICE — AMBU AURA-I U SIZE 4, DISPOSABLE LARYNGEAL MASK: Brand: AURA-I

## (undated) DEVICE — NEPTUNE E-SEP SMOKE EVACUATION PENCIL, COATED, 70MM BLADE, ROCKER SWITCH: Brand: NEPTUNE E-SEP

## (undated) DEVICE — BLADE OPHTH 180DEG CUT SURF BLU STR SHRP DBL BVL GRINDLESS

## (undated) DEVICE — BANDAGE COMPR W6INXL12FT SMOOTH FOR LIMB EXSANG ESMARCH

## (undated) DEVICE — ZIMMER® STERILE DISPOSABLE TOURNIQUET CUFF WITH PLC, DUAL PORT, SINGLE BLADDER, 18 IN. (46 CM)

## (undated) DEVICE — MINOR CDS: Brand: MEDLINE INDUSTRIES, INC.

## (undated) DEVICE — GLOVE SURG SZ 8 CRM LTX FREE POLYISOPRENE POLYMER BEAD ANTI

## (undated) DEVICE — SUTURE VICRYL SZ 2-0 L27IN ABSRB UD L26MM SH 1/2 CIR J417H

## (undated) DEVICE — GAUZE,SPONGE,FLUFF,6"X6.75",STRL,10/TRAY: Brand: MEDLINE

## (undated) DEVICE — UNDERGLOVE SURG SZ 8 FNGR THK0.21MIL GRN LTX BEAD CUF

## (undated) DEVICE — STRIP,CLOSURE,WOUND,MEDI-STRIP,1/2X4: Brand: MEDLINE

## (undated) DEVICE — PADDING,UNDERCAST,COTTON, 4"X4YD STERILE: Brand: MEDLINE

## (undated) DEVICE — BANDAGE COMPR W4INXL15FT BGE E SGL LAYERED CLP CLSR

## (undated) DEVICE — DRAPE,U/ SHT,SPLIT,PLAS,STERIL: Brand: MEDLINE

## (undated) DEVICE — LIQUIBAND RAPID ADHESIVE 36/CS 0.8ML: Brand: MEDLINE